# Patient Record
Sex: FEMALE | Race: WHITE | URBAN - METROPOLITAN AREA
[De-identification: names, ages, dates, MRNs, and addresses within clinical notes are randomized per-mention and may not be internally consistent; named-entity substitution may affect disease eponyms.]

---

## 2024-05-11 ENCOUNTER — NURSING HOME VISIT (OUTPATIENT)
Dept: POST ACUTE CARE | Facility: EXTERNAL LOCATION | Age: 84
End: 2024-05-11
Payer: COMMERCIAL

## 2024-05-11 DIAGNOSIS — E10.9 TYPE 1 DIABETES MELLITUS WITHOUT COMPLICATION (MULTI): ICD-10-CM

## 2024-05-11 DIAGNOSIS — F32.A DEPRESSION, UNSPECIFIED DEPRESSION TYPE: ICD-10-CM

## 2024-05-11 DIAGNOSIS — I10 HYPERTENSION, UNSPECIFIED TYPE: ICD-10-CM

## 2024-05-11 DIAGNOSIS — K21.9 GASTROESOPHAGEAL REFLUX DISEASE, UNSPECIFIED WHETHER ESOPHAGITIS PRESENT: ICD-10-CM

## 2024-05-11 DIAGNOSIS — L03.90 CELLULITIS, UNSPECIFIED CELLULITIS SITE: Primary | ICD-10-CM

## 2024-05-11 DIAGNOSIS — E78.5 HYPERLIPIDEMIA, UNSPECIFIED HYPERLIPIDEMIA TYPE: ICD-10-CM

## 2024-05-11 PROCEDURE — 99305 1ST NF CARE MODERATE MDM 35: CPT | Performed by: INTERNAL MEDICINE

## 2024-05-11 NOTE — LETTER
Patient: Christina Davila  : 1940    Encounter Date: 2024    HISTORY & PHYSICAL    Subjective  Chief complaint: Christina Davila is a 83 y.o. female who is being seen and evaluated for multiple medical problems.  Patient admitted to SNF for therapy due to weakness after recent hospitalization.    HPI:  HPI  Patient presented to ED with leg swelling and wounds.  Patient was admitted for further evaluation management.  Patient was diagnosed with cellulitis and started on antibiotics, followed by ID.  Wound care followed patient for bilateral lower extremity wounds.  Patient is to continue antibiotics outpatient.  Patient was hemodynamically stable to be discharged to SNF for continued medical management and therapy.  Patient was seen and examined at the bedside, appears to be in no acute distress.  Patient denies chest pain or shortness of breath.  Denies nausea or vomiting.    Past Medical History:   Diagnosis Date   • Arthritis    • Cellulitis    • Cognitive communication deficit    • Diabetes mellitus (Multi)    • GERD (gastroesophageal reflux disease)    • Hyperlipidemia    • Hypertension        No past surgical history on file.    Family History   Problem Relation Name Age of Onset   • No Known Problems Mother     • No Known Problems Father         Social History     Socioeconomic History   • Marital status: Single     Spouse name: Not on file   • Number of children: Not on file   • Years of education: Not on file   • Highest education level: Not on file   Occupational History   • Not on file   Tobacco Use   • Smoking status: Not on file   • Smokeless tobacco: Not on file   Substance and Sexual Activity   • Alcohol use: Not on file   • Drug use: Not on file   • Sexual activity: Not on file   Other Topics Concern   • Not on file   Social History Narrative   • Not on file     Social Determinants of Health     Financial Resource Strain: Not on file   Food Insecurity: Not on file   Transportation Needs: Not on  file   Physical Activity: Not on file   Stress: Not on file   Social Connections: Not on file   Intimate Partner Violence: Not on file   Housing Stability: Not on file       Vital signs: 110/88, 97.9, 78, 17, 98%, blood sugar 126    Objective  Physical Exam  Constitutional:       General: She is not in acute distress.  Eyes:      Extraocular Movements: Extraocular movements intact.   Cardiovascular:      Rate and Rhythm: Normal rate and regular rhythm.   Pulmonary:      Effort: Pulmonary effort is normal.      Breath sounds: Normal breath sounds.   Abdominal:      General: Bowel sounds are normal.      Palpations: Abdomen is soft.   Genitourinary:     Comments: Rodriguez  Musculoskeletal:      Cervical back: Neck supple.      Right lower leg: No edema.      Left lower leg: No edema.   Neurological:      Mental Status: She is alert.   Psychiatric:         Mood and Affect: Mood normal.         Behavior: Behavior is cooperative.         Assessment/Plan  Problem List Items Addressed This Visit       Cellulitis - Primary     Continue antibiotic until complete         Diabetes mellitus (Multi)     Monitor fasting blood sugar  Glucoscans         GERD (gastroesophageal reflux disease)     Controlled  Monitor GI symptoms         Hyperlipidemia     Statin  Monitor lipids and LFTs         Hypertension     Monitor blood pressure  Metoprolol  Hydrochlorothiazide         Depression     Monitor mood and behaviors  Quetiapine           Hospital records reviewed  Medications, treatments, and labs reviewed  Continue medications and treatments as listed in EMR  Discussed with nursing and therapy      Scribe Attestation  I, Shantel Chen   attest that this documentation has been prepared under the direction and in the presence of Christian Mazariegos MD    Provider Attestation - Scribe documentation  All medical record entries made by the Scribe were at my direction and personally dictated by me. I have reviewed the chart and agree  that the record accurately reflects my personal performance of the history, physical exam, discussion and plan.   Chirstian Mazariegos MD      Electronically Signed By: Christian Mazariegos MD   5/14/24  4:36 PM

## 2024-05-14 ENCOUNTER — NURSING HOME VISIT (OUTPATIENT)
Dept: POST ACUTE CARE | Facility: EXTERNAL LOCATION | Age: 84
End: 2024-05-14
Payer: COMMERCIAL

## 2024-05-14 DIAGNOSIS — E78.5 HYPERLIPIDEMIA, UNSPECIFIED HYPERLIPIDEMIA TYPE: ICD-10-CM

## 2024-05-14 DIAGNOSIS — F32.A DEPRESSION, UNSPECIFIED DEPRESSION TYPE: ICD-10-CM

## 2024-05-14 DIAGNOSIS — R41.841 COGNITIVE COMMUNICATION DEFICIT: ICD-10-CM

## 2024-05-14 DIAGNOSIS — R53.1 WEAKNESS: ICD-10-CM

## 2024-05-14 DIAGNOSIS — I10 HYPERTENSION, UNSPECIFIED TYPE: ICD-10-CM

## 2024-05-14 DIAGNOSIS — M19.90 ARTHRITIS: Primary | ICD-10-CM

## 2024-05-14 DIAGNOSIS — E10.9 TYPE 1 DIABETES MELLITUS WITHOUT COMPLICATION (MULTI): ICD-10-CM

## 2024-05-14 DIAGNOSIS — L03.90 CELLULITIS, UNSPECIFIED CELLULITIS SITE: ICD-10-CM

## 2024-05-14 DIAGNOSIS — E46 MALNUTRITION, UNSPECIFIED TYPE (MULTI): ICD-10-CM

## 2024-05-14 PROBLEM — K21.9 GERD (GASTROESOPHAGEAL REFLUX DISEASE): Status: ACTIVE | Noted: 2024-05-14

## 2024-05-14 PROBLEM — E11.9 DIABETES MELLITUS (MULTI): Status: ACTIVE | Noted: 2024-05-14

## 2024-05-14 PROCEDURE — 99309 SBSQ NF CARE MODERATE MDM 30: CPT | Performed by: NURSE PRACTITIONER

## 2024-05-14 NOTE — PROGRESS NOTES
HISTORY & PHYSICAL    Subjective   Chief complaint: Christina Davila is a 83 y.o. female who is being seen and evaluated for multiple medical problems.  Patient admitted to SNF for therapy due to weakness after recent hospitalization.    HPI:  HPI  Patient presented to ED with leg swelling and wounds.  Patient was admitted for further evaluation management.  Patient was diagnosed with cellulitis and started on antibiotics, followed by ID.  Wound care followed patient for bilateral lower extremity wounds.  Patient is to continue antibiotics outpatient.  Patient was hemodynamically stable to be discharged to SNF for continued medical management and therapy.  Patient was seen and examined at the bedside, appears to be in no acute distress.  Patient denies chest pain or shortness of breath.  Denies nausea or vomiting.    Past Medical History:   Diagnosis Date    Arthritis     Cellulitis     Cognitive communication deficit     Diabetes mellitus (Multi)     GERD (gastroesophageal reflux disease)     Hyperlipidemia     Hypertension        No past surgical history on file.    Family History   Problem Relation Name Age of Onset    No Known Problems Mother      No Known Problems Father         Social History     Socioeconomic History    Marital status: Single     Spouse name: Not on file    Number of children: Not on file    Years of education: Not on file    Highest education level: Not on file   Occupational History    Not on file   Tobacco Use    Smoking status: Not on file    Smokeless tobacco: Not on file   Substance and Sexual Activity    Alcohol use: Not on file    Drug use: Not on file    Sexual activity: Not on file   Other Topics Concern    Not on file   Social History Narrative    Not on file     Social Determinants of Health     Financial Resource Strain: Not on file   Food Insecurity: Not on file   Transportation Needs: Not on file   Physical Activity: Not on file   Stress: Not on file   Social Connections: Not on  file   Intimate Partner Violence: Not on file   Housing Stability: Not on file       Vital signs: 110/88, 97.9, 78, 17, 98%, blood sugar 126    Objective   Physical Exam  Constitutional:       General: She is not in acute distress.  Eyes:      Extraocular Movements: Extraocular movements intact.   Cardiovascular:      Rate and Rhythm: Normal rate and regular rhythm.   Pulmonary:      Effort: Pulmonary effort is normal.      Breath sounds: Normal breath sounds.   Abdominal:      General: Bowel sounds are normal.      Palpations: Abdomen is soft.   Genitourinary:     Comments: Rodriguez  Musculoskeletal:      Cervical back: Neck supple.      Right lower leg: No edema.      Left lower leg: No edema.   Neurological:      Mental Status: She is alert.   Psychiatric:         Mood and Affect: Mood normal.         Behavior: Behavior is cooperative.         Assessment/Plan   Problem List Items Addressed This Visit       Cellulitis - Primary     Continue antibiotic until complete         Diabetes mellitus (Multi)     Monitor fasting blood sugar  Glucoscans         GERD (gastroesophageal reflux disease)     Controlled  Monitor GI symptoms         Hyperlipidemia     Statin  Monitor lipids and LFTs         Hypertension     Monitor blood pressure  Metoprolol  Hydrochlorothiazide         Depression     Monitor mood and behaviors  Quetiapine           Hospital records reviewed  Medications, treatments, and labs reviewed  Continue medications and treatments as listed in EMR  Discussed with nursing and therapy      Scribe Attestation  I, Shantel Chen   attest that this documentation has been prepared under the direction and in the presence of Christian Mazariegos MD    Provider Attestation - Scribe documentation  All medical record entries made by the Scribe were at my direction and personally dictated by me. I have reviewed the chart and agree that the record accurately reflects my personal performance of the history, physical  exam, discussion and plan.   Christian Mazariegos MD

## 2024-05-14 NOTE — LETTER
Patient: Christina Davila  : 1940    Encounter Date: 2024    PROGRESS NOTE    Subjective  Chief complaint: Christina Davila is a 83 y.o. female who is an acute skilled patient being seen and evaluated for weakness    HPI: is in bed with her lower legs elevated.  She remains on antibiotics for resolving cellulitis.  afebrile.  Nursing reports that her that she has a fair appetite.  She denies any discomfort at this time.  Denies chest pain or tightness.  Was seen by therapy this morning  HPI      Objective  Vital signs: 119/88-78-17-98.3     Physical Exam  Vitals and nursing note reviewed.   Constitutional:       General: She is not in acute distress.     Appearance: She is underweight.   Eyes:      Extraocular Movements: Extraocular movements intact.   Cardiovascular:      Rate and Rhythm: Normal rate and regular rhythm.   Pulmonary:      Effort: Pulmonary effort is normal.      Breath sounds: Normal breath sounds.      Comments: Lungs clear   Abdominal:      General: Bowel sounds are normal.      Palpations: Abdomen is soft.   Musculoskeletal:      Cervical back: Neck supple.      Right lower leg: No edema.      Left lower leg: No edema.      Comments: Both hands with arthritic changes, limited extension in fingers    Neurological:      Mental Status: She is alert.   Psychiatric:         Mood and Affect: Mood normal.         Behavior: Behavior is cooperative.         Assessment/Plan  Problem List Items Addressed This Visit       Cellulitis     Continue antibiotic until complete  Wound care is following wound care needs          Cognitive communication deficit     Monitor abilities   ST to assess         Diabetes mellitus (Multi)     Monitor fasting blood sugar  Glucoscans         Hyperlipidemia     Statin  Monitor lipids and LFTs         Hypertension     Monitor blood pressure  Metoprolol  Hydrochlorothiazide  BP at goal         Weakness     Remains in therapy   Agreed with regular scheduled dose of tylenol    Monitor effectiveness          Depression     Monitor mood and behaviors  Quetiapine          Arthritis - Primary     Has extensive arthritic changes in upper arms and hands. Has limited mobility - habds contracture    No inflammation          Malnutrition (Multi)     Underweight   Reports poor appetite   Monitor    Encourage supplements           Medications, treatments, and labs reviewed  Continue medications and treatments as listed in EMR    MARY Munoz      Electronically Signed By: MARY Munoz   5/16/24  6:31 PM

## 2024-05-14 NOTE — PROGRESS NOTES
PROGRESS NOTE    Subjective   Chief complaint: Christina Davila is a 83 y.o. female who is an acute skilled patient being seen and evaluated for weakness    HPI: is in bed with her lower legs elevated.  She remains on antibiotics for resolving cellulitis.  afebrile.  Nursing reports that her that she has a fair appetite.  She denies any discomfort at this time.  Denies chest pain or tightness.  Was seen by therapy this morning  HPI      Objective   Vital signs: 119/88-78-17-98.3     Physical Exam  Vitals and nursing note reviewed.   Constitutional:       General: She is not in acute distress.     Appearance: She is underweight.   Eyes:      Extraocular Movements: Extraocular movements intact.   Cardiovascular:      Rate and Rhythm: Normal rate and regular rhythm.   Pulmonary:      Effort: Pulmonary effort is normal.      Breath sounds: Normal breath sounds.      Comments: Lungs clear   Abdominal:      General: Bowel sounds are normal.      Palpations: Abdomen is soft.   Musculoskeletal:      Cervical back: Neck supple.      Right lower leg: No edema.      Left lower leg: No edema.      Comments: Both hands with arthritic changes, limited extension in fingers    Neurological:      Mental Status: She is alert.   Psychiatric:         Mood and Affect: Mood normal.         Behavior: Behavior is cooperative.         Assessment/Plan   Problem List Items Addressed This Visit       Cellulitis     Continue antibiotic until complete  Wound care is following wound care needs          Cognitive communication deficit     Monitor abilities   ST to assess         Diabetes mellitus (Multi)     Monitor fasting blood sugar  Glucoscans         Hyperlipidemia     Statin  Monitor lipids and LFTs         Hypertension     Monitor blood pressure  Metoprolol  Hydrochlorothiazide  BP at goal         Weakness     Remains in therapy   Agreed with regular scheduled dose of tylenol   Monitor effectiveness          Depression     Monitor mood and  behaviors  Quetiapine          Arthritis - Primary     Has extensive arthritic changes in upper arms and hands. Has limited mobility - habds contracture    No inflammation          Malnutrition (Multi)     Underweight   Reports poor appetite   Monitor    Encourage supplements           Medications, treatments, and labs reviewed  Continue medications and treatments as listed in EMR    Stella Duncan, APRN-CNP

## 2024-05-15 ENCOUNTER — NURSING HOME VISIT (OUTPATIENT)
Dept: POST ACUTE CARE | Facility: EXTERNAL LOCATION | Age: 84
End: 2024-05-15
Payer: COMMERCIAL

## 2024-05-15 DIAGNOSIS — F32.A DEPRESSION, UNSPECIFIED DEPRESSION TYPE: ICD-10-CM

## 2024-05-15 DIAGNOSIS — I10 HYPERTENSION, UNSPECIFIED TYPE: ICD-10-CM

## 2024-05-15 DIAGNOSIS — E10.9 TYPE 1 DIABETES MELLITUS WITHOUT COMPLICATION (MULTI): ICD-10-CM

## 2024-05-15 DIAGNOSIS — K21.9 GASTROESOPHAGEAL REFLUX DISEASE, UNSPECIFIED WHETHER ESOPHAGITIS PRESENT: ICD-10-CM

## 2024-05-15 DIAGNOSIS — R53.1 WEAKNESS: Primary | ICD-10-CM

## 2024-05-15 PROCEDURE — 99309 SBSQ NF CARE MODERATE MDM 30: CPT | Performed by: INTERNAL MEDICINE

## 2024-05-15 NOTE — PROGRESS NOTES
PROGRESS NOTE    Subjective   Chief complaint: Christina Davila is a 83 y.o. female who is an acute skilled patient being seen and evaluated for weakness    HPI:  HPI  Patient presents for general medical care and f/u.  Patient seen and examined at bedside.  No issues per nursing.  Patient has no acute complaints. Patient does continue to work in therapy.  Patient is requiring mod assist to complete transfers from various surfaces.  DM, denies polydipsia polyuria polyphagia.  HTN BP at goal.  Denies chest pain and headache.  GERD controlled.  Denies heartburn, regurgitation, epigastric discomfort, sour taste, and cough.  Patient with diagnosis of depression.  Mood is stable.  Denies feeling down and thoughts of harming self or others.  No acute distress.    Objective   Vital signs: 110/88, 98.1, 78, 17, 98%, blood sugar 123    Physical Exam  Constitutional:       General: She is not in acute distress.  Eyes:      Extraocular Movements: Extraocular movements intact.   Cardiovascular:      Rate and Rhythm: Normal rate and regular rhythm.   Pulmonary:      Effort: Pulmonary effort is normal.      Breath sounds: Normal breath sounds.   Abdominal:      General: Bowel sounds are normal.      Palpations: Abdomen is soft.   Genitourinary:     Comments: Rodriguez  Musculoskeletal:      Cervical back: Neck supple.      Right lower leg: No edema.      Left lower leg: No edema.   Neurological:      Mental Status: She is alert.      Motor: Weakness present.   Psychiatric:         Mood and Affect: Mood normal.         Behavior: Behavior is cooperative.         Assessment/Plan   Problem List Items Addressed This Visit       Diabetes mellitus (Multi)     Monitor fasting blood sugar  Glucoscans         GERD (gastroesophageal reflux disease)     Controlled  Monitor GI symptoms         Hypertension     Monitor blood pressure  Metoprolol  Hydrochlorothiazide  BP at goal         Weakness - Primary     Continue therapy         Depression      Monitor mood and behaviors  Quetiapine           Medications, treatments, and labs reviewed  Continue medications and treatments as listed in EMR      Scribe Attestation  I, Shantel Chen   attest that this documentation has been prepared under the direction and in the presence of Christian Mazariegos MD    Provider Attestation - Scribe documentation  All medical record entries made by the Scribe were at my direction and personally dictated by me. I have reviewed the chart and agree that the record accurately reflects my personal performance of the history, physical exam, discussion and plan.   Christian Mazariegos MD

## 2024-05-15 NOTE — LETTER
Patient: Christina Davila  : 1940    Encounter Date: 05/15/2024    PROGRESS NOTE    Subjective  Chief complaint: Christina Davila is a 83 y.o. female who is an acute skilled patient being seen and evaluated for weakness    HPI:  HPI  Patient presents for general medical care and f/u.  Patient seen and examined at bedside.  No issues per nursing.  Patient has no acute complaints. Patient does continue to work in therapy.  Patient is requiring mod assist to complete transfers from various surfaces.  DM, denies polydipsia polyuria polyphagia.  HTN BP at goal.  Denies chest pain and headache.  GERD controlled.  Denies heartburn, regurgitation, epigastric discomfort, sour taste, and cough.  Patient with diagnosis of depression.  Mood is stable.  Denies feeling down and thoughts of harming self or others.  No acute distress.    Objective  Vital signs: 110/88, 98.1, 78, 17, 98%, blood sugar 123    Physical Exam  Constitutional:       General: She is not in acute distress.  Eyes:      Extraocular Movements: Extraocular movements intact.   Cardiovascular:      Rate and Rhythm: Normal rate and regular rhythm.   Pulmonary:      Effort: Pulmonary effort is normal.      Breath sounds: Normal breath sounds.   Abdominal:      General: Bowel sounds are normal.      Palpations: Abdomen is soft.   Genitourinary:     Comments: Rodriguez  Musculoskeletal:      Cervical back: Neck supple.      Right lower leg: No edema.      Left lower leg: No edema.   Neurological:      Mental Status: She is alert.      Motor: Weakness present.   Psychiatric:         Mood and Affect: Mood normal.         Behavior: Behavior is cooperative.         Assessment/Plan  Problem List Items Addressed This Visit       Diabetes mellitus (Multi)     Monitor fasting blood sugar  Glucoscans         GERD (gastroesophageal reflux disease)     Controlled  Monitor GI symptoms         Hypertension     Monitor blood pressure  Metoprolol  Hydrochlorothiazide  BP at goal          Weakness - Primary     Continue therapy         Depression     Monitor mood and behaviors  Quetiapine           Medications, treatments, and labs reviewed  Continue medications and treatments as listed in EMR      Scribe Attestation  I, Shantel Chen   attest that this documentation has been prepared under the direction and in the presence of Christian Mazariegos MD    Provider Attestation - Scribe documentation  All medical record entries made by the Scribe were at my direction and personally dictated by me. I have reviewed the chart and agree that the record accurately reflects my personal performance of the history, physical exam, discussion and plan.   Christian Mazariegos MD        Electronically Signed By: Christian Mazariegos MD   5/15/24  1:51 PM

## 2024-05-16 ENCOUNTER — NURSING HOME VISIT (OUTPATIENT)
Dept: POST ACUTE CARE | Facility: EXTERNAL LOCATION | Age: 84
End: 2024-05-16
Payer: COMMERCIAL

## 2024-05-16 DIAGNOSIS — R41.841 COGNITIVE COMMUNICATION DEFICIT: Primary | ICD-10-CM

## 2024-05-16 DIAGNOSIS — E78.5 HYPERLIPIDEMIA, UNSPECIFIED HYPERLIPIDEMIA TYPE: ICD-10-CM

## 2024-05-16 DIAGNOSIS — K21.9 GASTROESOPHAGEAL REFLUX DISEASE, UNSPECIFIED WHETHER ESOPHAGITIS PRESENT: ICD-10-CM

## 2024-05-16 DIAGNOSIS — E46 MALNUTRITION, UNSPECIFIED TYPE (MULTI): ICD-10-CM

## 2024-05-16 DIAGNOSIS — M19.90 ARTHRITIS: ICD-10-CM

## 2024-05-16 DIAGNOSIS — E10.9 TYPE 1 DIABETES MELLITUS WITHOUT COMPLICATION (MULTI): ICD-10-CM

## 2024-05-16 DIAGNOSIS — I10 HYPERTENSION, UNSPECIFIED TYPE: ICD-10-CM

## 2024-05-16 DIAGNOSIS — R53.1 WEAKNESS: ICD-10-CM

## 2024-05-16 DIAGNOSIS — L03.90 CELLULITIS, UNSPECIFIED CELLULITIS SITE: ICD-10-CM

## 2024-05-16 DIAGNOSIS — F32.A DEPRESSION, UNSPECIFIED DEPRESSION TYPE: ICD-10-CM

## 2024-05-16 PROCEDURE — 99309 SBSQ NF CARE MODERATE MDM 30: CPT | Performed by: NURSE PRACTITIONER

## 2024-05-16 NOTE — ASSESSMENT & PLAN NOTE
>>ASSESSMENT AND PLAN FOR COGNITIVE COMMUNICATION DEFICIT WRITTEN ON 5/16/2024  6:26 PM BY TAMI GARCIA-HELENA    Monitor abilities   ST to assess

## 2024-05-16 NOTE — LETTER
Patient: Christina Davila  : 1940    Encounter Date: 2024    PROGRESS NOTE    Subjective  Chief complaint: Christina Davila is a 83 y.o. female who is an acute skilled patient being seen and evaluated for weakness    HPI: is working with the therapist, walking short distance. She reports everything hurts when she moves.    Hands and joints present with arthritic changes. She reports that she doesn't like to take medications. Agreed with recommendation for regularly scheduled dose of tylenol   Will monitor. Denies any chest pain or tightness.    She reports that her diet is poor. Encourage supplements     HPI      Objective  Vital signs: 126/73-88-18-97.2    Physical Exam  Vitals and nursing note reviewed.   Constitutional:       General: She is not in acute distress.     Appearance: She is well-groomed and underweight.   Eyes:      Extraocular Movements: Extraocular movements intact.   Cardiovascular:      Rate and Rhythm: Normal rate and regular rhythm.   Pulmonary:      Effort: Pulmonary effort is normal.      Breath sounds: Normal breath sounds.      Comments: Lungs clear   Abdominal:      General: Bowel sounds are normal.      Palpations: Abdomen is soft.   Musculoskeletal:      Cervical back: Neck supple.      Right lower leg: No edema.      Left lower leg: No edema.   Neurological:      Mental Status: She is alert.   Psychiatric:         Mood and Affect: Mood normal.         Behavior: Behavior is cooperative.         Assessment/Plan  Problem List Items Addressed This Visit       Cellulitis     Continue antibiotic until complete  Wound care is following wound care needs          Cognitive communication deficit - Primary     Monitor abilities   ST to assess         Diabetes mellitus (Multi)     Monitor fasting blood sugar  Glucoscans         GERD (gastroesophageal reflux disease)     Controlled  Monitor GI symptoms         Hyperlipidemia     Statin  Monitor lipids and LFTs         Hypertension     Monitor  blood pressure  Metoprolol  Hydrochlorothiazide  BP at goal         Weakness     Remains in therapy   Agreed with regular scheduled dose of tylenol   Monitor effectiveness          Depression     Monitor mood and behaviors  Quetiapine          Arthritis     Has extensive arthritic changes in upper arms and hands. Has limited mobility - habds contracture    No inflammation          Malnutrition (Multi)     Underweight   Reports poor appetite   Monitor    Encourage supplements           Medications, treatments, and labs reviewed  Continue medications and treatments as listed in EMR    MARY Munoz      Electronically Signed By: MARY Munoz   5/16/24  6:29 PM

## 2024-05-16 NOTE — ASSESSMENT & PLAN NOTE
Has extensive arthritic changes in upper arms and hands. Has limited mobility - habds contracture    No inflammation

## 2024-05-16 NOTE — ASSESSMENT & PLAN NOTE
>>ASSESSMENT AND PLAN FOR COGNITIVE COMMUNICATION DEFICIT WRITTEN ON 5/16/2024  6:31 PM BY TAMI GARCIA-HELENA    Monitor abilities   ST to assess

## 2024-05-16 NOTE — PROGRESS NOTES
PROGRESS NOTE    Subjective   Chief complaint: Christina Davila is a 83 y.o. female who is an acute skilled patient being seen and evaluated for weakness    HPI: is working with the therapist, walking short distance. She reports everything hurts when she moves.    Hands and joints present with arthritic changes. She reports that she doesn't like to take medications. Agreed with recommendation for regularly scheduled dose of tylenol   Will monitor. Denies any chest pain or tightness.    She reports that her diet is poor. Encourage supplements     HPI      Objective   Vital signs: 126/73-88-18-97.2    Physical Exam  Vitals and nursing note reviewed.   Constitutional:       General: She is not in acute distress.     Appearance: She is well-groomed and underweight.   Eyes:      Extraocular Movements: Extraocular movements intact.   Cardiovascular:      Rate and Rhythm: Normal rate and regular rhythm.   Pulmonary:      Effort: Pulmonary effort is normal.      Breath sounds: Normal breath sounds.      Comments: Lungs clear   Abdominal:      General: Bowel sounds are normal.      Palpations: Abdomen is soft.   Musculoskeletal:      Cervical back: Neck supple.      Right lower leg: No edema.      Left lower leg: No edema.   Neurological:      Mental Status: She is alert.   Psychiatric:         Mood and Affect: Mood normal.         Behavior: Behavior is cooperative.         Assessment/Plan   Problem List Items Addressed This Visit       Cellulitis     Continue antibiotic until complete  Wound care is following wound care needs          Cognitive communication deficit - Primary     Monitor abilities   ST to assess         Diabetes mellitus (Multi)     Monitor fasting blood sugar  Glucoscans         GERD (gastroesophageal reflux disease)     Controlled  Monitor GI symptoms         Hyperlipidemia     Statin  Monitor lipids and LFTs         Hypertension     Monitor blood pressure  Metoprolol  Hydrochlorothiazide  BP at goal          Weakness     Remains in therapy   Agreed with regular scheduled dose of tylenol   Monitor effectiveness          Depression     Monitor mood and behaviors  Quetiapine          Arthritis     Has extensive arthritic changes in upper arms and hands. Has limited mobility - habds contracture    No inflammation          Malnutrition (Multi)     Underweight   Reports poor appetite   Monitor    Encourage supplements           Medications, treatments, and labs reviewed  Continue medications and treatments as listed in EMR    Stella Duncan, APRN-CNP

## 2024-05-18 ENCOUNTER — NURSING HOME VISIT (OUTPATIENT)
Dept: POST ACUTE CARE | Facility: EXTERNAL LOCATION | Age: 84
End: 2024-05-18
Payer: COMMERCIAL

## 2024-05-18 DIAGNOSIS — E10.9 TYPE 1 DIABETES MELLITUS WITHOUT COMPLICATION (MULTI): ICD-10-CM

## 2024-05-18 DIAGNOSIS — I10 HYPERTENSION, UNSPECIFIED TYPE: ICD-10-CM

## 2024-05-18 DIAGNOSIS — F32.A DEPRESSION, UNSPECIFIED DEPRESSION TYPE: ICD-10-CM

## 2024-05-18 DIAGNOSIS — R53.1 WEAKNESS: ICD-10-CM

## 2024-05-18 PROCEDURE — 99308 SBSQ NF CARE LOW MDM 20: CPT | Performed by: INTERNAL MEDICINE

## 2024-05-18 NOTE — LETTER
Patient: Christina Davila  : 1940    Encounter Date: 2024    PROGRESS NOTE    Subjective  Chief complaint: Christina Davila is a 83 y.o. female who is an acute skilled patient being seen and evaluated for weakness    HPI:  Patient admitted to SNF for therapy d/t weakness after recent hospitalization. Patient requires assist with ADLs and transfers.  Pt progressed on Scifit bike on lvl. 1 for 10 minutes with use of BLE's/BUE's on manual setting. Pt sat EOB with MOD/A for supine<>Eob transfer completing UB dressing with MOD/MAX/A and grooming hygiene with MAX/A requiring MOD tactile cuing to maintain cervical extension / F- sitting balance. Pt tolerated up to 5 mins of activity with several rest breaks secondary to fatigue. ST focusing on speech skills. Given mod verbal cues, context cues, repetition, environmental modifications and structuring with integration of adl objects/manipulatives to faciitate cognizant, effective adl verbal expression. No new complaints.        Objective  Vital signs: 114/76,70,97%,     Physical Exam  Constitutional:       General: She is not in acute distress.  Eyes:      Extraocular Movements: Extraocular movements intact.   Cardiovascular:      Rate and Rhythm: Normal rate and regular rhythm.   Pulmonary:      Effort: Pulmonary effort is normal.      Breath sounds: Normal breath sounds.   Abdominal:      General: Bowel sounds are normal.      Palpations: Abdomen is soft.   Genitourinary:     Comments: Rodriguez  Musculoskeletal:      Cervical back: Neck supple.      Right lower leg: No edema.      Left lower leg: No edema.   Neurological:      Mental Status: She is alert.      Motor: Weakness present.   Psychiatric:         Mood and Affect: Mood normal.         Behavior: Behavior is cooperative.         Assessment/Plan  Problem List Items Addressed This Visit       Diabetes mellitus (Multi)     Monitor fasting blood sugar  Glucoscans         Hypertension     Monitor blood  pressure  Metoprolol  Hydrochlorothiazide  BP at goal         Weakness     Remains in therapy           Depression     Monitor mood and behaviors  Quetiapine           Medications, treatments, and labs reviewed  Continue medications and treatments as listed in EMR    Scribe Attestation  PIERRE, Shantel Valentin   attest that this documentation has been prepared under the direction and in the presence of Christian Mazariegos MD.     Provider Attestation - Scribe documentation  All medical record entries made by the Scribe were at my direction and personally dictated by me. I have reviewed the chart and agree that the record accurately reflects my personal performance of the history, physical exam, discussion and plan.   Christian Mazariegos MD      Electronically Signed By: Christian Mazariegos MD   5/20/24  5:09 PM

## 2024-05-20 ENCOUNTER — NURSING HOME VISIT (OUTPATIENT)
Dept: POST ACUTE CARE | Facility: EXTERNAL LOCATION | Age: 84
End: 2024-05-20
Payer: COMMERCIAL

## 2024-05-20 DIAGNOSIS — R53.1 WEAKNESS: ICD-10-CM

## 2024-05-20 DIAGNOSIS — F32.A DEPRESSION, UNSPECIFIED DEPRESSION TYPE: ICD-10-CM

## 2024-05-20 DIAGNOSIS — E10.9 TYPE 1 DIABETES MELLITUS WITHOUT COMPLICATION (MULTI): ICD-10-CM

## 2024-05-20 DIAGNOSIS — I10 HYPERTENSION, UNSPECIFIED TYPE: ICD-10-CM

## 2024-05-20 PROCEDURE — 99308 SBSQ NF CARE LOW MDM 20: CPT | Performed by: INTERNAL MEDICINE

## 2024-05-20 NOTE — PROGRESS NOTES
PROGRESS NOTE    Subjective   Chief complaint: Christina Davila is a 83 y.o. female who is an acute skilled patient being seen and evaluated for weakness    HPI:  Therapy has been working with the patient to improve strength and endurance with ADLs, transfers, and mobility.  Patient continues to work toward goals. Focused on dynamic balance activities during sitting and static balance activities during sitting on side of bed for 15 minutes with no support support. Bed mobility rolling m mod of1 . Supine - sitting mod of1/ min of1. Patient need cuing to move hips forward on bed with cuing on weight shifiting. Sit- stand from bed / - w/c mod of 1-2. ST working on speech skills. Given mod verbal cues context cues, environmental modifications and structuring, skilled observations with forced and mulitple choice. GOALS: 1)open ended ?s 70% mod cues 2) recall: 70% mod cues P.  Patient is stable and has no new complaints.  Nursing staff voices no new concerns today.      Objective   Vital signs: 114/76,70,97%,     Physical Exam  Constitutional:       General: She is not in acute distress.  Eyes:      Extraocular Movements: Extraocular movements intact.   Cardiovascular:      Rate and Rhythm: Normal rate and regular rhythm.   Pulmonary:      Effort: Pulmonary effort is normal.      Breath sounds: Normal breath sounds.   Abdominal:      General: Bowel sounds are normal.      Palpations: Abdomen is soft.   Genitourinary:     Comments: Rodriguez  Musculoskeletal:      Cervical back: Neck supple.      Right lower leg: No edema.      Left lower leg: No edema.   Neurological:      Mental Status: She is alert.      Motor: Weakness present.   Psychiatric:         Mood and Affect: Mood normal.         Behavior: Behavior is cooperative.         Assessment/Plan   Problem List Items Addressed This Visit       Diabetes mellitus (Multi)     Monitor fasting blood sugar  Glucoscans         Hypertension     Monitor blood  pressure  Metoprolol  Hydrochlorothiazide  BP at goal         Weakness     Remains in therapy           Depression     Monitor mood and behaviors  Quetiapine           Medications, treatments, and labs reviewed  Continue medications and treatments as listed in EMR    Scribe Attestation  PIERRE, Shantel Valentin   attest that this documentation has been prepared under the direction and in the presence of Christian Mazariegos MD.     Provider Attestation - Scribe documentation  All medical record entries made by the Scribe were at my direction and personally dictated by me. I have reviewed the chart and agree that the record accurately reflects my personal performance of the history, physical exam, discussion and plan.   Christian Mazariegos MD

## 2024-05-20 NOTE — PROGRESS NOTES
PROGRESS NOTE    Subjective   Chief complaint: Christina Davila is a 83 y.o. female who is an acute skilled patient being seen and evaluated for weakness    HPI:  Patient admitted to SNF for therapy d/t weakness after recent hospitalization. Patient requires assist with ADLs and transfers.  Pt progressed on Scifit bike on lvl. 1 for 10 minutes with use of BLE's/BUE's on manual setting. Pt sat EOB with MOD/A for supine<>Eob transfer completing UB dressing with MOD/MAX/A and grooming hygiene with MAX/A requiring MOD tactile cuing to maintain cervical extension / F- sitting balance. Pt tolerated up to 5 mins of activity with several rest breaks secondary to fatigue. ST focusing on speech skills. Given mod verbal cues, context cues, repetition, environmental modifications and structuring with integration of adl objects/manipulatives to faciitate cognizant, effective adl verbal expression. No new complaints.        Objective   Vital signs: 114/76,70,97%,     Physical Exam  Constitutional:       General: She is not in acute distress.  Eyes:      Extraocular Movements: Extraocular movements intact.   Cardiovascular:      Rate and Rhythm: Normal rate and regular rhythm.   Pulmonary:      Effort: Pulmonary effort is normal.      Breath sounds: Normal breath sounds.   Abdominal:      General: Bowel sounds are normal.      Palpations: Abdomen is soft.   Genitourinary:     Comments: Rodriguez  Musculoskeletal:      Cervical back: Neck supple.      Right lower leg: No edema.      Left lower leg: No edema.   Neurological:      Mental Status: She is alert.      Motor: Weakness present.   Psychiatric:         Mood and Affect: Mood normal.         Behavior: Behavior is cooperative.         Assessment/Plan   Problem List Items Addressed This Visit       Diabetes mellitus (Multi)     Monitor fasting blood sugar  Glucoscans         Hypertension     Monitor blood pressure  Metoprolol  Hydrochlorothiazide  BP at goal         Weakness      Remains in therapy           Depression     Monitor mood and behaviors  Quetiapine           Medications, treatments, and labs reviewed  Continue medications and treatments as listed in EMR    Scribe Attestation  I, Shantel Valentin   attest that this documentation has been prepared under the direction and in the presence of Christian Mazariegos MD.     Provider Attestation - Scribe documentation  All medical record entries made by the Scribe were at my direction and personally dictated by me. I have reviewed the chart and agree that the record accurately reflects my personal performance of the history, physical exam, discussion and plan.   Christian Mazariegos MD

## 2024-05-21 ENCOUNTER — NURSING HOME VISIT (OUTPATIENT)
Dept: POST ACUTE CARE | Facility: EXTERNAL LOCATION | Age: 84
End: 2024-05-21
Payer: COMMERCIAL

## 2024-05-21 DIAGNOSIS — R53.1 WEAKNESS: Primary | ICD-10-CM

## 2024-05-21 DIAGNOSIS — F32.A DEPRESSION, UNSPECIFIED DEPRESSION TYPE: ICD-10-CM

## 2024-05-21 DIAGNOSIS — M19.90 ARTHRITIS: ICD-10-CM

## 2024-05-21 DIAGNOSIS — L03.90 CELLULITIS, UNSPECIFIED CELLULITIS SITE: ICD-10-CM

## 2024-05-21 DIAGNOSIS — I10 HYPERTENSION, UNSPECIFIED TYPE: ICD-10-CM

## 2024-05-21 DIAGNOSIS — E46 MALNUTRITION, UNSPECIFIED TYPE (MULTI): ICD-10-CM

## 2024-05-21 DIAGNOSIS — E78.5 HYPERLIPIDEMIA, UNSPECIFIED HYPERLIPIDEMIA TYPE: ICD-10-CM

## 2024-05-21 DIAGNOSIS — E87.1 HYPONATREMIA: ICD-10-CM

## 2024-05-21 DIAGNOSIS — E10.9 TYPE 1 DIABETES MELLITUS WITHOUT COMPLICATION (MULTI): ICD-10-CM

## 2024-05-21 DIAGNOSIS — K21.9 GASTROESOPHAGEAL REFLUX DISEASE, UNSPECIFIED WHETHER ESOPHAGITIS PRESENT: ICD-10-CM

## 2024-05-21 PROCEDURE — 99309 SBSQ NF CARE MODERATE MDM 30: CPT | Performed by: NURSE PRACTITIONER

## 2024-05-21 NOTE — LETTER
Patient: Christina Davila  : 1940    Encounter Date: 2024    PROGRESS NOTE    Subjective  Chief complaint: Christina Davila is a 83 y.o. female who is an acute skilled patient being seen and evaluated for weakness    HPI: .  Actively responding, talkative.  Remains in therapy.  She denies any chest pain or tightness.     PT reports that she is making slow progress.    Reviewed her current labs.    Her sodium level is low (126).  Will add sodium chloride tablets daily and repeat labs.    White blood count is slightly elevated 11.6, to send urine culture.    She denies any difficulty voiding.  Denies discomfort or burning.  The  reports that she  will be transferred back home with her .  They have a private caregiver. Will require hospital bed    HPI      Objective  Vital signs: 110/67-78-18-97.3     Physical Exam  Vitals and nursing note reviewed.   Constitutional:       General: She is not in acute distress.     Appearance: She is underweight.   Eyes:      Extraocular Movements: Extraocular movements intact.   Cardiovascular:      Rate and Rhythm: Normal rate and regular rhythm.   Pulmonary:      Effort: Pulmonary effort is normal.      Breath sounds: Normal breath sounds.      Comments: Lungs clear   Abdominal:      General: Bowel sounds are normal.      Palpations: Abdomen is soft.   Musculoskeletal:      Cervical back: Neck supple.      Right lower leg: No edema.      Left lower leg: No edema.      Comments: Both hands with arthritic changes, limited extension in fingers    Neurological:      Mental Status: She is alert.   Psychiatric:         Mood and Affect: Mood normal.         Behavior: Behavior is cooperative.         Assessment/Plan  Problem List Items Addressed This Visit       Cellulitis     Continue antibiotic until complete  Wound care is following wound care needs          Diabetes mellitus (Multi)     Monitor fasting blood sugar  Glucoscans         GERD (gastroesophageal reflux  disease)     Controlled  Monitor GI symptoms         Hyperlipidemia     Statin  Monitor lipids and LFTs         Hypertension     Monitor blood pressure  Metoprolol  Hydrochlorothiazide  BP at goal         Weakness - Primary     Continue working in therapy towards goals         Depression     Monitor mood and behaviors  Quetiapine          Arthritis     Has extensive arthritic changes in upper arms and hands. Has limited mobility - hands contracture    No inflammation          Malnutrition (Multi)     Underweight   Reports poor appetite   Monitor    Encourage supplements          Hyponatremia     Sodium tablets  Monitor BMP          Medications, treatments, and labs reviewed  Continue medications and treatments as listed in EMR    MARY Munoz      Electronically Signed By: MARY Munoz   5/25/24 11:59 AM

## 2024-05-21 NOTE — PROGRESS NOTES
PROGRESS NOTE    Subjective   Chief complaint: Christina Davila is a 83 y.o. female who is an acute skilled patient being seen and evaluated for weakness    HPI: .  Actively responding, talkative.  Remains in therapy.  She denies any chest pain or tightness.     PT reports that she is making slow progress.    Reviewed her current labs.    Her sodium level is low (126).  Will add sodium chloride tablets daily and repeat labs.    White blood count is slightly elevated 11.6, to send urine culture.    She denies any difficulty voiding.  Denies discomfort or burning.  The  reports that she  will be transferred back home with her .  They have a private caregiver. Will require hospital bed    HPI      Objective   Vital signs: 110/67-78-18-97.3     Physical Exam  Vitals and nursing note reviewed.   Constitutional:       General: She is not in acute distress.     Appearance: She is underweight.   Eyes:      Extraocular Movements: Extraocular movements intact.   Cardiovascular:      Rate and Rhythm: Normal rate and regular rhythm.   Pulmonary:      Effort: Pulmonary effort is normal.      Breath sounds: Normal breath sounds.      Comments: Lungs clear   Abdominal:      General: Bowel sounds are normal.      Palpations: Abdomen is soft.   Musculoskeletal:      Cervical back: Neck supple.      Right lower leg: No edema.      Left lower leg: No edema.      Comments: Both hands with arthritic changes, limited extension in fingers    Neurological:      Mental Status: She is alert.   Psychiatric:         Mood and Affect: Mood normal.         Behavior: Behavior is cooperative.         Assessment/Plan   Problem List Items Addressed This Visit       Cellulitis     Continue antibiotic until complete  Wound care is following wound care needs          Diabetes mellitus (Multi)     Monitor fasting blood sugar  Glucoscans         GERD (gastroesophageal reflux disease)     Controlled  Monitor GI symptoms          Hyperlipidemia     Statin  Monitor lipids and LFTs         Hypertension     Monitor blood pressure  Metoprolol  Hydrochlorothiazide  BP at goal         Weakness - Primary     Continue working in therapy towards goals         Depression     Monitor mood and behaviors  Quetiapine          Arthritis     Has extensive arthritic changes in upper arms and hands. Has limited mobility - hands contracture    No inflammation          Malnutrition (Multi)     Underweight   Reports poor appetite   Monitor    Encourage supplements          Hyponatremia     Sodium tablets  Monitor BMP          Medications, treatments, and labs reviewed  Continue medications and treatments as listed in EMR    Stella Duncan, APRN-CNP

## 2024-05-22 ENCOUNTER — NURSING HOME VISIT (OUTPATIENT)
Dept: POST ACUTE CARE | Facility: EXTERNAL LOCATION | Age: 84
End: 2024-05-22
Payer: COMMERCIAL

## 2024-05-22 DIAGNOSIS — I10 HYPERTENSION, UNSPECIFIED TYPE: ICD-10-CM

## 2024-05-22 DIAGNOSIS — R41.841 COGNITIVE COMMUNICATION DEFICIT: ICD-10-CM

## 2024-05-22 DIAGNOSIS — R53.1 WEAKNESS: Primary | ICD-10-CM

## 2024-05-22 DIAGNOSIS — K21.9 GASTROESOPHAGEAL REFLUX DISEASE, UNSPECIFIED WHETHER ESOPHAGITIS PRESENT: ICD-10-CM

## 2024-05-22 DIAGNOSIS — E87.1 HYPONATREMIA: ICD-10-CM

## 2024-05-22 PROCEDURE — 99308 SBSQ NF CARE LOW MDM 20: CPT | Performed by: INTERNAL MEDICINE

## 2024-05-22 NOTE — ASSESSMENT & PLAN NOTE
>>ASSESSMENT AND PLAN FOR COGNITIVE COMMUNICATION DEFICIT WRITTEN ON 5/22/2024  3:00 PM BY MICH WILLS    Speech therapy

## 2024-05-22 NOTE — LETTER
Patient: Christina Davila  : 1940    Encounter Date: 2024    PROGRESS NOTE    Subjective  Chief complaint: Christina Davila is a 83 y.o. female who is an acute skilled patient being seen and evaluated for weakness    HPI:  HPI  Patient does continue to work in therapy due to generalized weakness.  Therapy is working with patient on therapeutic exercises, dynamic balance activities during sitting and static balance activities during sitting on side of bed for 15 minutes.  Patient is also working on bed mobility requiring mod assist x 1.  Patient performing sit to stand from bed to wheelchair requiring mod assist of 1-2.  Patient seen and examined at the bedside, appears to be in no acute distress.  Patient does continue on sodium tablets due to sodium reportedly 126. Continue to monitor BMP    Objective  Vital signs: 126/76, 98.0, 17, 97%    Physical Exam  Constitutional:       General: She is not in acute distress.  Eyes:      Extraocular Movements: Extraocular movements intact.   Cardiovascular:      Rate and Rhythm: Normal rate and regular rhythm.   Pulmonary:      Effort: Pulmonary effort is normal.      Breath sounds: Normal breath sounds.   Abdominal:      General: Bowel sounds are normal.      Palpations: Abdomen is soft.   Genitourinary:     Comments: Rodriguez  Musculoskeletal:      Cervical back: Neck supple.      Right lower leg: No edema.      Left lower leg: No edema.   Neurological:      Mental Status: She is alert.      Motor: Weakness present.   Psychiatric:         Mood and Affect: Mood normal.         Behavior: Behavior is cooperative.         Assessment/Plan  Problem List Items Addressed This Visit       Cognitive communication deficit     Speech therapy         GERD (gastroesophageal reflux disease)     Controlled  Monitor GI symptoms         Hypertension     Monitor blood pressure  Metoprolol  Hydrochlorothiazide  BP at goal         Weakness - Primary     Continue working in therapy towards  goals         Hyponatremia     Sodium tablets  Monitor BMP          Medications, treatments, and labs reviewed  Continue medications and treatments as listed in EMR      Scribe Attestation  I, Shantel Chen   attest that this documentation has been prepared under the direction and in the presence of Christian Mazariegos MD    Provider Attestation - Scribe documentation  All medical record entries made by the Scribe were at my direction and personally dictated by me. I have reviewed the chart and agree that the record accurately reflects my personal performance of the history, physical exam, discussion and plan.   Christian Mazariegos MD        Electronically Signed By: Christian Mazariegos MD   5/22/24  3:09 PM

## 2024-05-22 NOTE — PROGRESS NOTES
PROGRESS NOTE    Subjective   Chief complaint: Christina Davila is a 83 y.o. female who is an acute skilled patient being seen and evaluated for weakness    HPI:  HPI  Patient does continue to work in therapy due to generalized weakness.  Therapy is working with patient on therapeutic exercises, dynamic balance activities during sitting and static balance activities during sitting on side of bed for 15 minutes.  Patient is also working on bed mobility requiring mod assist x 1.  Patient performing sit to stand from bed to wheelchair requiring mod assist of 1-2.  Patient seen and examined at the bedside, appears to be in no acute distress.  Patient does continue on sodium tablets due to sodium reportedly 126. Continue to monitor BMP    Objective   Vital signs: 126/76, 98.0, 17, 97%    Physical Exam  Constitutional:       General: She is not in acute distress.  Eyes:      Extraocular Movements: Extraocular movements intact.   Cardiovascular:      Rate and Rhythm: Normal rate and regular rhythm.   Pulmonary:      Effort: Pulmonary effort is normal.      Breath sounds: Normal breath sounds.   Abdominal:      General: Bowel sounds are normal.      Palpations: Abdomen is soft.   Genitourinary:     Comments: Rodriguez  Musculoskeletal:      Cervical back: Neck supple.      Right lower leg: No edema.      Left lower leg: No edema.   Neurological:      Mental Status: She is alert.      Motor: Weakness present.   Psychiatric:         Mood and Affect: Mood normal.         Behavior: Behavior is cooperative.         Assessment/Plan   Problem List Items Addressed This Visit       Cognitive communication deficit     Speech therapy         GERD (gastroesophageal reflux disease)     Controlled  Monitor GI symptoms         Hypertension     Monitor blood pressure  Metoprolol  Hydrochlorothiazide  BP at goal         Weakness - Primary     Continue working in therapy towards goals         Hyponatremia     Sodium tablets  Monitor BMP           Medications, treatments, and labs reviewed  Continue medications and treatments as listed in EMR      Scribe Attestation  I, Shantel Chen   attest that this documentation has been prepared under the direction and in the presence of Christian Mazariegos MD    Provider Attestation - Scribe documentation  All medical record entries made by the Scribe were at my direction and personally dictated by me. I have reviewed the chart and agree that the record accurately reflects my personal performance of the history, physical exam, discussion and plan.   Christian Mazariegos MD

## 2024-05-23 ENCOUNTER — NURSING HOME VISIT (OUTPATIENT)
Dept: POST ACUTE CARE | Facility: EXTERNAL LOCATION | Age: 84
End: 2024-05-23
Payer: COMMERCIAL

## 2024-05-23 DIAGNOSIS — F03.90 DEMENTIA, UNSPECIFIED DEMENTIA SEVERITY, UNSPECIFIED DEMENTIA TYPE, UNSPECIFIED WHETHER BEHAVIORAL, PSYCHOTIC, OR MOOD DISTURBANCE OR ANXIETY (MULTI): ICD-10-CM

## 2024-05-23 DIAGNOSIS — K21.9 GASTROESOPHAGEAL REFLUX DISEASE, UNSPECIFIED WHETHER ESOPHAGITIS PRESENT: ICD-10-CM

## 2024-05-23 DIAGNOSIS — I10 HYPERTENSION, UNSPECIFIED TYPE: ICD-10-CM

## 2024-05-23 DIAGNOSIS — E46 MALNUTRITION, UNSPECIFIED TYPE (MULTI): Primary | ICD-10-CM

## 2024-05-23 DIAGNOSIS — F32.A DEPRESSION, UNSPECIFIED DEPRESSION TYPE: ICD-10-CM

## 2024-05-23 DIAGNOSIS — M19.90 ARTHRITIS: ICD-10-CM

## 2024-05-23 DIAGNOSIS — E78.5 HYPERLIPIDEMIA, UNSPECIFIED HYPERLIPIDEMIA TYPE: ICD-10-CM

## 2024-05-23 PROCEDURE — 99309 SBSQ NF CARE MODERATE MDM 30: CPT | Performed by: NURSE PRACTITIONER

## 2024-05-23 NOTE — LETTER
Patient: Christina Davila  : 1940    Encounter Date: 2024    PROGRESS NOTE    Subjective  Chief complaint: Christina Davila is a 83 y.o. female who is an acute skilled patient being seen and evaluated for weakness    HPI: Pleasant and talkative.  Has significant cognitive loss.  Just finished with therapy session.  Has no recall regarding her therapy session.  Requires extensive assistance from staff for all HOC and mobility.   Reviewed current labs.  Nursing reports her intake remains  inconsistent  HPI      Objective  Vital signs: 122/56-72-18-98.5    Physical Exam  Vitals and nursing note reviewed.   Constitutional:       General: She is not in acute distress.     Appearance: She is underweight.   Eyes:      Extraocular Movements: Extraocular movements intact.   Cardiovascular:      Rate and Rhythm: Normal rate and regular rhythm.   Pulmonary:      Effort: Pulmonary effort is normal.      Breath sounds: Normal breath sounds.      Comments: Lungs clear   Abdominal:      General: Bowel sounds are normal.      Palpations: Abdomen is soft.   Musculoskeletal:      Cervical back: Neck supple.      Right lower leg: No edema.      Left lower leg: No edema.      Comments: Both hands with arthritic changes, limited extension in fingers    Neurological:      Mental Status: She is alert.   Psychiatric:         Mood and Affect: Mood normal.         Behavior: Behavior is cooperative.         Assessment/Plan  Problem List Items Addressed This Visit       GERD (gastroesophageal reflux disease)     Controlled  Monitor GI symptoms         Hyperlipidemia     Statin  Monitor lipids and LFTs         Hypertension     Monitor blood pressure  Metoprolol  Hydrochlorothiazide  BP at goal         Depression    Arthritis     Has extensive arthritic changes in upper arms and hands. Has limited mobility - hands contracture    No inflammation          Malnutrition (Multi) - Primary     Underweight   Reports poor appetite   Monitor     Encourage supplements          Dementia (Multi)     Has extensive cognitive loss   Limited STM    Has communication deficit   ST to follow   Encourage all abilities           Medications, treatments, and labs reviewed  Continue medications and treatments as listed in EMR    MARY Munoz        Electronically Signed By: MARY Munoz   5/25/24 12:11 PM

## 2024-05-24 NOTE — PROGRESS NOTES
PROGRESS NOTE    Subjective   Chief complaint: Christina Davila is a 83 y.o. female who is an acute skilled patient being seen and evaluated for weakness    HPI: Pleasant and talkative.  Has significant cognitive loss.  Just finished with therapy session.  Has no recall regarding her therapy session.  Requires extensive assistance from staff for all HOC and mobility.   Reviewed current labs.  Nursing reports her intake remains  inconsistent  HPI      Objective   Vital signs: 122/56-72-18-98.5    Physical Exam  Vitals and nursing note reviewed.   Constitutional:       General: She is not in acute distress.     Appearance: She is underweight.   Eyes:      Extraocular Movements: Extraocular movements intact.   Cardiovascular:      Rate and Rhythm: Normal rate and regular rhythm.   Pulmonary:      Effort: Pulmonary effort is normal.      Breath sounds: Normal breath sounds.      Comments: Lungs clear   Abdominal:      General: Bowel sounds are normal.      Palpations: Abdomen is soft.   Musculoskeletal:      Cervical back: Neck supple.      Right lower leg: No edema.      Left lower leg: No edema.      Comments: Both hands with arthritic changes, limited extension in fingers    Neurological:      Mental Status: She is alert.   Psychiatric:         Mood and Affect: Mood normal.         Behavior: Behavior is cooperative.         Assessment/Plan   Problem List Items Addressed This Visit       GERD (gastroesophageal reflux disease)     Controlled  Monitor GI symptoms         Hyperlipidemia     Statin  Monitor lipids and LFTs         Hypertension     Monitor blood pressure  Metoprolol  Hydrochlorothiazide  BP at goal         Depression    Arthritis     Has extensive arthritic changes in upper arms and hands. Has limited mobility - hands contracture    No inflammation          Malnutrition (Multi) - Primary     Underweight   Reports poor appetite   Monitor    Encourage supplements          Dementia (Multi)     Has extensive  cognitive loss   Limited STM    Has communication deficit   ST to follow   Encourage all abilities           Medications, treatments, and labs reviewed  Continue medications and treatments as listed in EMR    Stella Duncan, APRN-CNP

## 2024-05-25 PROBLEM — F03.90 DEMENTIA (MULTI): Status: ACTIVE | Noted: 2024-05-14

## 2024-05-25 PROBLEM — F03.90 DEMENTIA (MULTI): Status: ACTIVE | Noted: 2024-05-25

## 2024-05-25 NOTE — ASSESSMENT & PLAN NOTE
Has extensive cognitive loss   Limited STM    Has communication deficit   ST to follow   Encourage all abilities

## 2024-05-25 NOTE — ASSESSMENT & PLAN NOTE
Has extensive arthritic changes in upper arms and hands. Has limited mobility - hands contracture    No inflammation

## 2024-05-27 ENCOUNTER — NURSING HOME VISIT (OUTPATIENT)
Dept: POST ACUTE CARE | Facility: EXTERNAL LOCATION | Age: 84
End: 2024-05-27
Payer: COMMERCIAL

## 2024-05-27 DIAGNOSIS — I10 HYPERTENSION, UNSPECIFIED TYPE: ICD-10-CM

## 2024-05-27 DIAGNOSIS — K21.9 GASTROESOPHAGEAL REFLUX DISEASE, UNSPECIFIED WHETHER ESOPHAGITIS PRESENT: ICD-10-CM

## 2024-05-27 DIAGNOSIS — R53.1 WEAKNESS: ICD-10-CM

## 2024-05-27 DIAGNOSIS — F03.90 DEMENTIA, UNSPECIFIED DEMENTIA SEVERITY, UNSPECIFIED DEMENTIA TYPE, UNSPECIFIED WHETHER BEHAVIORAL, PSYCHOTIC, OR MOOD DISTURBANCE OR ANXIETY (MULTI): ICD-10-CM

## 2024-05-27 DIAGNOSIS — E10.9 TYPE 1 DIABETES MELLITUS WITHOUT COMPLICATION (MULTI): ICD-10-CM

## 2024-05-27 PROCEDURE — 99308 SBSQ NF CARE LOW MDM 20: CPT | Performed by: INTERNAL MEDICINE

## 2024-05-27 NOTE — LETTER
Patient: Christina Davila  : 1940    Encounter Date: 2024    PROGRESS NOTE    Subjective  Chief complaint: Christina Davila is a 83 y.o. female who is an acute skilled patient being seen and evaluated for weakness    HPI:  Patient seen and examined at bedside.  Patient denies n/v/f/c.  Continues working in therapy. ST to focus on exercises to increase naming/word finding skills, facilitatation of ability to follow verbal instruction during daily living tasks and graded choice presentation to facilitate receptive/expressive abilities use circumlocution strategy, use function description strategy and request clarification. Patient completed dynamic balance activities during sitting, training in rolling, scooting, bridging to facilitate (I) bed mobility, transfer training to increase functional task performance, facilitation of postural control and crossing midline to facilitate independence in functional skill performance with on hand placement with cuing.  No new complaints.      Objective  Vital signs: 111/73,76,97%,     Physical Exam  Constitutional:       General: She is not in acute distress.  Eyes:      Extraocular Movements: Extraocular movements intact.   Cardiovascular:      Rate and Rhythm: Normal rate and regular rhythm.   Pulmonary:      Effort: Pulmonary effort is normal.      Breath sounds: Normal breath sounds.   Abdominal:      General: Bowel sounds are normal.      Palpations: Abdomen is soft.   Genitourinary:     Comments: Rodriguez  Musculoskeletal:      Cervical back: Neck supple.      Right lower leg: No edema.      Left lower leg: No edema.   Neurological:      Mental Status: She is alert.      Motor: Weakness present.   Psychiatric:         Mood and Affect: Mood normal.         Behavior: Behavior is cooperative.         Assessment/Plan  Problem List Items Addressed This Visit       Diabetes mellitus (Multi)     Monitor fasting blood sugar  Glucoscans         GERD (gastroesophageal reflux  disease)     Controlled  Monitor GI symptoms         Hypertension     Monitor blood pressure  Metoprolol  Hydrochlorothiazide  BP at goal         Weakness     Continue working in therapy towards goals         Dementia (Multi)     ST for cognition  Monitor safety          Medications, treatments, and labs reviewed  Continue medications and treatments as listed in EMR    Scribe Attestation  Chiquita JAY Scribe   attest that this documentation has been prepared under the direction and in the presence of Christian Mazariegos MD.     Provider Attestation - Scribe documentation  All medical record entries made by the Scribe were at my direction and personally dictated by me. I have reviewed the chart and agree that the record accurately reflects my personal performance of the history, physical exam, discussion and plan.   Christian Mazariegos MD      Electronically Signed By: Christian Mazariegos MD   5/28/24  6:10 PM

## 2024-05-28 ENCOUNTER — NURSING HOME VISIT (OUTPATIENT)
Dept: POST ACUTE CARE | Facility: EXTERNAL LOCATION | Age: 84
End: 2024-05-28
Payer: COMMERCIAL

## 2024-05-28 DIAGNOSIS — F03.90 DEMENTIA, UNSPECIFIED DEMENTIA SEVERITY, UNSPECIFIED DEMENTIA TYPE, UNSPECIFIED WHETHER BEHAVIORAL, PSYCHOTIC, OR MOOD DISTURBANCE OR ANXIETY (MULTI): Primary | ICD-10-CM

## 2024-05-28 DIAGNOSIS — M19.90 ARTHRITIS: ICD-10-CM

## 2024-05-28 DIAGNOSIS — E10.9 TYPE 1 DIABETES MELLITUS WITHOUT COMPLICATION (MULTI): ICD-10-CM

## 2024-05-28 DIAGNOSIS — F32.A DEPRESSION, UNSPECIFIED DEPRESSION TYPE: ICD-10-CM

## 2024-05-28 DIAGNOSIS — R53.1 WEAKNESS: ICD-10-CM

## 2024-05-28 PROCEDURE — 99309 SBSQ NF CARE MODERATE MDM 30: CPT | Performed by: NURSE PRACTITIONER

## 2024-05-28 NOTE — ASSESSMENT & PLAN NOTE
Continue working in therapy towards goals  PT reports that she has made progress    Has extensive arthritic changes in hands and arms, limited functional mobility

## 2024-05-28 NOTE — PROGRESS NOTES
PROGRESS NOTE    Subjective   Chief complaint: Christina Davila is a 83 y.o. female who is an acute skilled patient being seen and evaluated for weakness    HPI: Remains in therapy.  He is pleasant and talkative.  Just finished lunch.  Has no recall of what she ate.  Caregiver reports that her appetite was good.  He is asking to go back to bed.  Reports that she is tired.    Had recent KUB done. No impaction.  No abnormalities identified.    Nursing reports that she had been complaining of some gastric distress over the weekend.  The caregiver reports that she has been having regular stools on a daily basis . Denies any tenderness.   HPI      Objective   Vital signs: 123/68-77-18-97.3    Physical Exam  Vitals and nursing note reviewed.   Constitutional:       General: She is not in acute distress.  Eyes:      Extraocular Movements: Extraocular movements intact.   Cardiovascular:      Rate and Rhythm: Normal rate and regular rhythm.   Pulmonary:      Effort: Pulmonary effort is normal.      Breath sounds: Normal breath sounds.   Abdominal:      General: Bowel sounds are normal.      Palpations: Abdomen is soft.      Comments: Soft, not distended.   BS x 4 active    Musculoskeletal:      Cervical back: Neck supple.      Right lower leg: No edema.      Left lower leg: No edema.   Neurological:      Mental Status: She is alert.   Psychiatric:         Mood and Affect: Mood normal.         Behavior: Behavior is cooperative.         Cognition and Memory: Cognition is impaired. Memory is impaired.         Assessment/Plan   Problem List Items Addressed This Visit       Diabetes mellitus (Multi)     Monitor fasting blood sugar  Glucoscans         Weakness     Continue working in therapy towards goals  PT reports that she has made progress    Has extensive arthritic changes in hands and arms, limited functional mobility         Depression     Monitor mood and behaviors  Quetiapine          Arthritis     Has extensive arthritic  changes in upper arms and hands. Has limited mobility - hands contracture    No inflammation          Dementia (Multi) - Primary     ST for cognition  Monitor safety  Memory poor   Cognitive loss          Medications, treatments, and labs reviewed  Continue medications and treatments as listed in EMR    Stella Duncan, APRN-CNP

## 2024-05-28 NOTE — ASSESSMENT & PLAN NOTE
Assume care of patient, patient alert and oriented x 4, skin warm and dry, police at bedside Monitor mood and behaviors  Quetiapine

## 2024-05-28 NOTE — LETTER
Patient: Christina Davila  : 1940    Encounter Date: 2024    PROGRESS NOTE    Subjective  Chief complaint: Christina Davila is a 83 y.o. female who is an acute skilled patient being seen and evaluated for weakness    HPI: Remains in therapy.  He is pleasant and talkative.  Just finished lunch.  Has no recall of what she ate.  Caregiver reports that her appetite was good.  He is asking to go back to bed.  Reports that she is tired.    Had recent KUB done. No impaction.  No abnormalities identified.    Nursing reports that she had been complaining of some gastric distress over the weekend.  The caregiver reports that she has been having regular stools on a daily basis . Denies any tenderness.   HPI      Objective  Vital signs: 123/68-77-18-97.3    Physical Exam  Vitals and nursing note reviewed.   Constitutional:       General: She is not in acute distress.  Eyes:      Extraocular Movements: Extraocular movements intact.   Cardiovascular:      Rate and Rhythm: Normal rate and regular rhythm.   Pulmonary:      Effort: Pulmonary effort is normal.      Breath sounds: Normal breath sounds.   Abdominal:      General: Bowel sounds are normal.      Palpations: Abdomen is soft.      Comments: Soft, not distended.   BS x 4 active    Musculoskeletal:      Cervical back: Neck supple.      Right lower leg: No edema.      Left lower leg: No edema.   Neurological:      Mental Status: She is alert.   Psychiatric:         Mood and Affect: Mood normal.         Behavior: Behavior is cooperative.         Cognition and Memory: Cognition is impaired. Memory is impaired.         Assessment/Plan  Problem List Items Addressed This Visit       Diabetes mellitus (Multi)     Monitor fasting blood sugar  Glucoscans         Weakness     Continue working in therapy towards goals  PT reports that she has made progress    Has extensive arthritic changes in hands and arms, limited functional mobility         Depression     Monitor mood and  Physical Therapy Visit    Referred by: Leatha Baum MD; Medical Diagnosis (from order):    Diagnosis Information      Diagnosis    724.2, 724.3, 338.29 (ICD-9-CM) - M54.41, G89.29 (ICD-10-CM) - Chronic bilateral low back pain with right-sided sciatica              Visit: 2    Visit Type: Daily Treatment Note    SUBJECTIVE                                                                                                               Everything is going okay. Pain shifting over to left lower extremity too. Feels like pain is making her weak. Still reporting continued pain (sharp/shooting) down into right buttock and down right lower extremity.   Pain / Symptoms:  Pain/symptom is: intermittent  Pain rating (out of 10): Current: 7     OBJECTIVE                                                                                                                        TREATMENT                                                                                                                  Therapeutic Exercise:  Subjective interview   Discussed hook-lying position with legs propped up for decompression  Lower Trunk Rotation x1-3 minutes   Supine piriformis \"pull\" stretch 2 x 30 seconds   Supine piriformis \"push\" stretch   Bent Knee Fall-outs x1-3 minutes   Supine sciatic nerve glide on R/L    Rock tape for lumbar support and postural awareness- two horizontal pieces with 50% strength along lower lumbar paraspinals, one horizontal piece along L4-L5- Held today.     Skilled input: verbal instruction/cues and tactile instruction/cues    Writer verbally educated and received verbal consent for hand placement, positioning of patient, and techniques to be performed today from patient for clothing adjustments for techniques, hand placement and palpation for techniques and therapist position for techniques as described above and how they are pertinent to the patient's plan of care.    Home Exercise Program/Education Materials:  Access Code: [email or web address]  URL: [email or web address]  Date: 08/26/2022  Prepared by: Rod Albert    Exercises  · Supine Lower Trunk Rotation - 1 x daily - 7 x weekly - 3 sets - 10 reps  · Bent Knee Fallouts with Alternating Legs - 1 x daily - 7 x weekly - 3 sets - 10 reps  · Supine Piriformis Stretch with Foot on Ground - 1 x daily - 7 x weekly - 3 sets - 30 seconds hold  · Supine Piriformis Stretch with Foot on Ground - 1 x daily - 7 x weekly - 3 sets - 30 seconds hold  · Supine Sciatic Nerve Glide - 1 x daily - 7 x weekly - 2-3 sets - 10 reps  · Supine Figure 4 Piriformis Stretch - 1 x daily - 7 x weekly - 3 sets - 10 reps                    Un-attended Electrical Stimulation (94291/): Interferential Current  Purpose: pain relief  Location: Lumbar spine   Position: prone  Pulse Rate:  Hz  Intensity: patient tolerance  Electrode Placement: X- pattern   In conjunction with: moist hot pack  Duration: 15 minutes  Results: decreased pain  Reaction: no adverse reaction to treatment      ASSESSMENT                                                                                                             Significant time spent reviewing previous HEP and progressing as able. Some increased radicular symptoms on right side > left. Trial of IFC and heat for pain management. Responded well with reduction in symptoms upon completion. Await MRI results. Patient would benefit from continued skilled physical therapy to address functional limitations noted above.     Patient Education:   Results of above outlined education: Verbalizes understanding and Needs reinforcement      PLAN                                                                                                                           Suggestions for next session as indicated: Progress per plan of care:  STM, IFC as indicated   Hold on traction for now   Review New            Therapy procedure time and total treatment time can  behaviors  Quetiapine          Arthritis     Has extensive arthritic changes in upper arms and hands. Has limited mobility - hands contracture    No inflammation          Dementia (Multi) - Primary     ST for cognition  Monitor safety  Memory poor   Cognitive loss          Medications, treatments, and labs reviewed  Continue medications and treatments as listed in EMR    MARY Munoz      Electronically Signed By: MARY Munoz   5/28/24  5:31 PM   be found documented on the Time Entry flowsheet

## 2024-05-28 NOTE — PROGRESS NOTES
PROGRESS NOTE    Subjective   Chief complaint: Christina Davila is a 83 y.o. female who is an acute skilled patient being seen and evaluated for weakness    HPI:  Patient seen and examined at bedside.  Patient denies n/v/f/c.  Continues working in therapy. ST to focus on exercises to increase naming/word finding skills, facilitatation of ability to follow verbal instruction during daily living tasks and graded choice presentation to facilitate receptive/expressive abilities use circumlocution strategy, use function description strategy and request clarification. Patient completed dynamic balance activities during sitting, training in rolling, scooting, bridging to facilitate (I) bed mobility, transfer training to increase functional task performance, facilitation of postural control and crossing midline to facilitate independence in functional skill performance with on hand placement with cuing.  No new complaints.      Objective   Vital signs: 111/73,76,97%,     Physical Exam  Constitutional:       General: She is not in acute distress.  Eyes:      Extraocular Movements: Extraocular movements intact.   Cardiovascular:      Rate and Rhythm: Normal rate and regular rhythm.   Pulmonary:      Effort: Pulmonary effort is normal.      Breath sounds: Normal breath sounds.   Abdominal:      General: Bowel sounds are normal.      Palpations: Abdomen is soft.   Genitourinary:     Comments: Rodriguez  Musculoskeletal:      Cervical back: Neck supple.      Right lower leg: No edema.      Left lower leg: No edema.   Neurological:      Mental Status: She is alert.      Motor: Weakness present.   Psychiatric:         Mood and Affect: Mood normal.         Behavior: Behavior is cooperative.         Assessment/Plan   Problem List Items Addressed This Visit       Diabetes mellitus (Multi)     Monitor fasting blood sugar  Glucoscans         GERD (gastroesophageal reflux disease)     Controlled  Monitor GI symptoms         Hypertension      Monitor blood pressure  Metoprolol  Hydrochlorothiazide  BP at goal         Weakness     Continue working in therapy towards goals         Dementia (Multi)     ST for cognition  Monitor safety          Medications, treatments, and labs reviewed  Continue medications and treatments as listed in EMR    Scribe Attestation  Chiquita JAY Scribe   attest that this documentation has been prepared under the direction and in the presence of Christian Mazariegos MD.     Provider Attestation - Scribe documentation  All medical record entries made by the Scribe were at my direction and personally dictated by me. I have reviewed the chart and agree that the record accurately reflects my personal performance of the history, physical exam, discussion and plan.   Christian Mazariegos MD

## 2024-05-29 ENCOUNTER — NURSING HOME VISIT (OUTPATIENT)
Dept: POST ACUTE CARE | Facility: EXTERNAL LOCATION | Age: 84
End: 2024-05-29
Payer: COMMERCIAL

## 2024-05-29 DIAGNOSIS — K21.9 GASTROESOPHAGEAL REFLUX DISEASE, UNSPECIFIED WHETHER ESOPHAGITIS PRESENT: ICD-10-CM

## 2024-05-29 DIAGNOSIS — E10.9 TYPE 1 DIABETES MELLITUS WITHOUT COMPLICATION (MULTI): ICD-10-CM

## 2024-05-29 DIAGNOSIS — F03.90 DEMENTIA, UNSPECIFIED DEMENTIA SEVERITY, UNSPECIFIED DEMENTIA TYPE, UNSPECIFIED WHETHER BEHAVIORAL, PSYCHOTIC, OR MOOD DISTURBANCE OR ANXIETY (MULTI): ICD-10-CM

## 2024-05-29 DIAGNOSIS — I10 HYPERTENSION, UNSPECIFIED TYPE: ICD-10-CM

## 2024-05-29 DIAGNOSIS — R53.1 WEAKNESS: Primary | ICD-10-CM

## 2024-05-29 PROCEDURE — 99308 SBSQ NF CARE LOW MDM 20: CPT | Performed by: INTERNAL MEDICINE

## 2024-05-29 NOTE — LETTER
Patient: Christina Davila  : 1940    Encounter Date: 2024    PROGRESS NOTE    Subjective  Chief complaint: Christina Davila is a 83 y.o. female who is an acute skilled patient being seen and evaluated for weakness    HPI:  HPI  Patient is continue to work in therapy.  Patient is performing therapeutic exercises sitting edge of bed unsupported to increase lower extremity strength and endurance and promote general circulation.  Patient is also working on sit to stand x 2 from wheelchair to outside parallel bars with max assist to mod assist.  Speech therapy work with patient to address cognition.  Patient seen and examined at the bedside, appears to be in no acute distress.    Objective  Vital signs: 120/77, 98.3, 80, 17, 98%, blood sugar 125    Physical Exam  Constitutional:       General: She is not in acute distress.  Eyes:      Extraocular Movements: Extraocular movements intact.   Cardiovascular:      Rate and Rhythm: Normal rate and regular rhythm.   Pulmonary:      Effort: Pulmonary effort is normal.      Breath sounds: Normal breath sounds.   Abdominal:      General: Bowel sounds are normal.      Palpations: Abdomen is soft.   Musculoskeletal:      Cervical back: Neck supple.      Right lower leg: No edema.      Left lower leg: No edema.   Neurological:      Mental Status: She is alert.      Motor: Weakness present.   Psychiatric:         Mood and Affect: Mood normal.         Behavior: Behavior is cooperative.         Assessment/Plan  Problem List Items Addressed This Visit       Diabetes mellitus (Multi)     Monitor fasting blood sugar  Glucoscans         GERD (gastroesophageal reflux disease)     Controlled  Monitor GI symptoms         Hypertension     Monitor blood pressure  Metoprolol  Hydrochlorothiazide  BP at goal         Weakness - Primary     Continue to work in therapy towards established goals         Dementia (Multi)     ST for cognition  Monitor safety  Memory poor   Cognitive loss           Medications, treatments, and labs reviewed  Continue medications and treatments as listed in EMR      Scribe Attestation  I, Shantel Chen   attest that this documentation has been prepared under the direction and in the presence of Christian Mazariegos MD    Provider Attestation - Scribe documentation  All medical record entries made by the Scribe were at my direction and personally dictated by me. I have reviewed the chart and agree that the record accurately reflects my personal performance of the history, physical exam, discussion and plan.   Christian Mazariegos MD        Electronically Signed By: Christian Mazariegos MD   5/29/24  5:55 PM

## 2024-05-29 NOTE — PROGRESS NOTES
PROGRESS NOTE    Subjective   Chief complaint: Christina Davila is a 83 y.o. female who is an acute skilled patient being seen and evaluated for weakness    HPI:  HPI  Patient is continue to work in therapy.  Patient is performing therapeutic exercises sitting edge of bed unsupported to increase lower extremity strength and endurance and promote general circulation.  Patient is also working on sit to stand x 2 from wheelchair to outside parallel bars with max assist to mod assist.  Speech therapy work with patient to address cognition.  Patient seen and examined at the bedside, appears to be in no acute distress.    Objective   Vital signs: 120/77, 98.3, 80, 17, 98%, blood sugar 125    Physical Exam  Constitutional:       General: She is not in acute distress.  Eyes:      Extraocular Movements: Extraocular movements intact.   Cardiovascular:      Rate and Rhythm: Normal rate and regular rhythm.   Pulmonary:      Effort: Pulmonary effort is normal.      Breath sounds: Normal breath sounds.   Abdominal:      General: Bowel sounds are normal.      Palpations: Abdomen is soft.   Musculoskeletal:      Cervical back: Neck supple.      Right lower leg: No edema.      Left lower leg: No edema.   Neurological:      Mental Status: She is alert.      Motor: Weakness present.   Psychiatric:         Mood and Affect: Mood normal.         Behavior: Behavior is cooperative.         Assessment/Plan   Problem List Items Addressed This Visit       Diabetes mellitus (Multi)     Monitor fasting blood sugar  Glucoscans         GERD (gastroesophageal reflux disease)     Controlled  Monitor GI symptoms         Hypertension     Monitor blood pressure  Metoprolol  Hydrochlorothiazide  BP at goal         Weakness - Primary     Continue to work in therapy towards established goals         Dementia (Multi)     ST for cognition  Monitor safety  Memory poor   Cognitive loss          Medications, treatments, and labs reviewed  Continue medications and  treatments as listed in EMR      Scribe Attestation  I, Shantel Chen   attest that this documentation has been prepared under the direction and in the presence of Christian Mazariegos MD    Provider Attestation - Scribe documentation  All medical record entries made by the Scribe were at my direction and personally dictated by me. I have reviewed the chart and agree that the record accurately reflects my personal performance of the history, physical exam, discussion and plan.   Christian Mazariegos MD

## 2024-05-30 ENCOUNTER — NURSING HOME VISIT (OUTPATIENT)
Dept: POST ACUTE CARE | Facility: EXTERNAL LOCATION | Age: 84
End: 2024-05-30
Payer: COMMERCIAL

## 2024-05-30 DIAGNOSIS — E10.9 TYPE 1 DIABETES MELLITUS WITHOUT COMPLICATION (MULTI): ICD-10-CM

## 2024-05-30 DIAGNOSIS — R53.1 WEAKNESS: ICD-10-CM

## 2024-05-30 DIAGNOSIS — K21.9 GASTROESOPHAGEAL REFLUX DISEASE, UNSPECIFIED WHETHER ESOPHAGITIS PRESENT: ICD-10-CM

## 2024-05-30 DIAGNOSIS — M19.90 ARTHRITIS: ICD-10-CM

## 2024-05-30 DIAGNOSIS — F03.90 DEMENTIA, UNSPECIFIED DEMENTIA SEVERITY, UNSPECIFIED DEMENTIA TYPE, UNSPECIFIED WHETHER BEHAVIORAL, PSYCHOTIC, OR MOOD DISTURBANCE OR ANXIETY (MULTI): ICD-10-CM

## 2024-05-30 DIAGNOSIS — E78.5 HYPERLIPIDEMIA, UNSPECIFIED HYPERLIPIDEMIA TYPE: ICD-10-CM

## 2024-05-30 DIAGNOSIS — F32.A DEPRESSION, UNSPECIFIED DEPRESSION TYPE: ICD-10-CM

## 2024-05-30 DIAGNOSIS — I10 HYPERTENSION, UNSPECIFIED TYPE: ICD-10-CM

## 2024-05-30 DIAGNOSIS — L03.90 CELLULITIS, UNSPECIFIED CELLULITIS SITE: ICD-10-CM

## 2024-05-30 DIAGNOSIS — E46 MALNUTRITION, UNSPECIFIED TYPE (MULTI): Primary | ICD-10-CM

## 2024-05-30 PROCEDURE — 99309 SBSQ NF CARE MODERATE MDM 30: CPT | Performed by: NURSE PRACTITIONER

## 2024-05-30 NOTE — LETTER
Patient: Christina Davila  : 1940    Encounter Date: 2024    PROGRESS NOTE    Subjective  Chief complaint: Chrsitina Davila is a 83 y.o. female who is an acute skilled patient being seen and evaluated for weakness    HPI: the therapist reports that she  has been refusing therapy sessions.    Nursing reports that she prefers to remain in bed and her appetite remains inconsistent.    She is currently in bed.  Reports that she is comfortable. Smiling when approached.   Denies any chest pain or tightness     HPI      Objective  Vital signs: 127/65-72-18-98.8    Physical Exam  Vitals and nursing note reviewed.   Constitutional:       General: She is not in acute distress.  Eyes:      Extraocular Movements: Extraocular movements intact.   Cardiovascular:      Rate and Rhythm: Normal rate and regular rhythm.   Pulmonary:      Effort: Pulmonary effort is normal.      Breath sounds: Normal breath sounds.   Abdominal:      General: Bowel sounds are normal.      Palpations: Abdomen is soft.      Comments: Soft, not distended.   BS x 4 active    Musculoskeletal:      Cervical back: Neck supple.      Right lower leg: No edema.      Left lower leg: No edema.   Neurological:      Mental Status: She is alert.   Psychiatric:         Mood and Affect: Mood normal.         Behavior: Behavior is cooperative.         Cognition and Memory: Cognition is impaired. Memory is impaired.         Assessment/Plan  Problem List Items Addressed This Visit       Cellulitis     Continue antibiotic until complete  Wound care is following wound care needs          Diabetes mellitus (Multi)     Monitor fasting blood sugar  Glucoscans         GERD (gastroesophageal reflux disease)     Controlled  Monitor GI symptoms         Hyperlipidemia     Statin  Monitor lipids and LFTs         Hypertension     Monitor blood pressure  Metoprolol  Hydrochlorothiazide  BP at goal         Weakness     Therapist reports that she is refusing therapy sessions    Continues to requires extensive assistance from staff for all HOC and mobility  Has extensive arthritic changes in hands and arms, limited functional mobility  SW reported that -plan is for her to return home with her spouse has private caregiver          Depression     Monitor mood and behaviors  Quetiapine          Arthritis     Has extensive arthritic changes in upper arms and hands. Has limited mobility - hands contracture    No inflammation   Tylenol prn          Malnutrition (Multi) - Primary     Underweight   Reports inconsistent  appetite   Monitor    Encourage supplements          Dementia (Multi)     ST for cognition  Monitor safety  Memory poor   Extensive Cognitive loss          Medications, treatments, and labs reviewed  Continue medications and treatments as listed in EMR    MARY Munoz      Electronically Signed By: MARY Munoz   6/1/24 10:01 AM

## 2024-05-31 NOTE — PROGRESS NOTES
PROGRESS NOTE    Subjective   Chief complaint: Christina Davila is a 83 y.o. female who is an acute skilled patient being seen and evaluated for weakness    HPI: the therapist reports that she  has been refusing therapy sessions.    Nursing reports that she prefers to remain in bed and her appetite remains inconsistent.    She is currently in bed.  Reports that she is comfortable. Smiling when approached.   Denies any chest pain or tightness     HPI      Objective   Vital signs: 127/65-72-18-98.8    Physical Exam  Vitals and nursing note reviewed.   Constitutional:       General: She is not in acute distress.  Eyes:      Extraocular Movements: Extraocular movements intact.   Cardiovascular:      Rate and Rhythm: Normal rate and regular rhythm.   Pulmonary:      Effort: Pulmonary effort is normal.      Breath sounds: Normal breath sounds.   Abdominal:      General: Bowel sounds are normal.      Palpations: Abdomen is soft.      Comments: Soft, not distended.   BS x 4 active    Musculoskeletal:      Cervical back: Neck supple.      Right lower leg: No edema.      Left lower leg: No edema.   Neurological:      Mental Status: She is alert.   Psychiatric:         Mood and Affect: Mood normal.         Behavior: Behavior is cooperative.         Cognition and Memory: Cognition is impaired. Memory is impaired.         Assessment/Plan   Problem List Items Addressed This Visit       Cellulitis     Continue antibiotic until complete  Wound care is following wound care needs          Diabetes mellitus (Multi)     Monitor fasting blood sugar  Glucoscans         GERD (gastroesophageal reflux disease)     Controlled  Monitor GI symptoms         Hyperlipidemia     Statin  Monitor lipids and LFTs         Hypertension     Monitor blood pressure  Metoprolol  Hydrochlorothiazide  BP at goal         Weakness     Therapist reports that she is refusing therapy sessions   Continues to requires extensive assistance from staff for all HOC and  mobility  Has extensive arthritic changes in hands and arms, limited functional mobility  SW reported that -plan is for her to return home with her spouse has private caregiver          Depression     Monitor mood and behaviors  Quetiapine          Arthritis     Has extensive arthritic changes in upper arms and hands. Has limited mobility - hands contracture    No inflammation   Tylenol prn          Malnutrition (Multi) - Primary     Underweight   Reports inconsistent  appetite   Monitor    Encourage supplements          Dementia (Multi)     ST for cognition  Monitor safety  Memory poor   Extensive Cognitive loss          Medications, treatments, and labs reviewed  Continue medications and treatments as listed in EMR    Stella Duncan, APRN-CNP

## 2024-06-01 NOTE — ASSESSMENT & PLAN NOTE
Therapist reports that she is refusing therapy sessions   Continues to requires extensive assistance from staff for all HOC and mobility  Has extensive arthritic changes in hands and arms, limited functional mobility  SW reported that -plan is for her to return home with her spouse has private caregiver

## 2024-06-01 NOTE — ASSESSMENT & PLAN NOTE
Has extensive arthritic changes in upper arms and hands. Has limited mobility - hands contracture    No inflammation   Tylenol prn

## 2024-06-01 NOTE — ASSESSMENT & PLAN NOTE
Continue antibiotic until complete  Wound care is following wound care needs    Patient arrives to ER with family, per parents patient has been on 5 day drinking binge. Patient obviously intoxicated at time of arrival to room. Patient crying, stating \"wheres my boyfriend. \" patient in 4 point restraints at this time.

## 2024-06-03 ENCOUNTER — NURSING HOME VISIT (OUTPATIENT)
Dept: POST ACUTE CARE | Facility: EXTERNAL LOCATION | Age: 84
End: 2024-06-03
Payer: COMMERCIAL

## 2024-06-03 DIAGNOSIS — R53.1 WEAKNESS: Primary | ICD-10-CM

## 2024-06-03 DIAGNOSIS — F03.90 DEMENTIA, UNSPECIFIED DEMENTIA SEVERITY, UNSPECIFIED DEMENTIA TYPE, UNSPECIFIED WHETHER BEHAVIORAL, PSYCHOTIC, OR MOOD DISTURBANCE OR ANXIETY (MULTI): ICD-10-CM

## 2024-06-03 DIAGNOSIS — I10 HYPERTENSION, UNSPECIFIED TYPE: ICD-10-CM

## 2024-06-03 DIAGNOSIS — F32.A DEPRESSION, UNSPECIFIED DEPRESSION TYPE: ICD-10-CM

## 2024-06-03 DIAGNOSIS — E10.9 TYPE 1 DIABETES MELLITUS WITHOUT COMPLICATION (MULTI): ICD-10-CM

## 2024-06-03 DIAGNOSIS — K21.9 GASTROESOPHAGEAL REFLUX DISEASE, UNSPECIFIED WHETHER ESOPHAGITIS PRESENT: ICD-10-CM

## 2024-06-03 PROCEDURE — 99308 SBSQ NF CARE LOW MDM 20: CPT | Performed by: INTERNAL MEDICINE

## 2024-06-03 NOTE — PROGRESS NOTES
PROGRESS NOTE    Subjective   Chief complaint: Christina Davila is a 83 y.o. female who is an acute skilled patient being seen and evaluated for weakness    HPI:  Patient has been working in therapy to improve strength, endurance, and ADLs.  Patient continues to work toward goals. Pt able to maintain full sidelying position with CGA. Pt performed supine to seated transfer with modA/Yasir.  Pt performed anterior scooting fwd using weight shifting strategy with SBA to Yasir with min/mod VC. Pt performed SPT from EOB to WC with maxA x2 with max VC and mod tactile cueing for LE sequencing/contralateral weight shifting with 25% carryover. Pt displayed impulsivity with descending on to stable surface with out VC. Max standing tolerance- 2:15. No new concerns today.  Denies n/v/f/c pain.        Objective   Vital signs: 111/61,72,95%,     Physical Exam  Constitutional:       General: She is not in acute distress.  Eyes:      Extraocular Movements: Extraocular movements intact.   Cardiovascular:      Rate and Rhythm: Normal rate and regular rhythm.   Pulmonary:      Effort: Pulmonary effort is normal.      Breath sounds: Normal breath sounds.   Abdominal:      General: Bowel sounds are normal.      Palpations: Abdomen is soft.   Musculoskeletal:      Cervical back: Neck supple.      Right lower leg: No edema.      Left lower leg: No edema.   Neurological:      Mental Status: She is alert.      Motor: Weakness present.   Psychiatric:         Mood and Affect: Mood normal.         Behavior: Behavior is cooperative.         Assessment/Plan   Problem List Items Addressed This Visit       Diabetes mellitus (Multi)     Monitor fasting blood sugar  Glucoscans         GERD (gastroesophageal reflux disease)     Controlled  Monitor GI symptoms         Hypertension     Monitor blood pressure  Metoprolol  Hydrochlorothiazide  BP at goal         Weakness - Primary     Continue therapy          Depression     Monitor mood and  behaviors  Quetiapine          Dementia (Multi)     ST for cognition  Monitor safety  Memory poor   Extensive Cognitive loss          Medications, treatments, and labs reviewed  Continue medications and treatments as listed in EMR    Scribe Attestation  PIERRE, Shantel Valentin   attest that this documentation has been prepared under the direction and in the presence of Christian Mazariegos MD.     Provider Attestation - Scribe documentation  All medical record entries made by the Scribe were at my direction and personally dictated by me. I have reviewed the chart and agree that the record accurately reflects my personal performance of the history, physical exam, discussion and plan.   Christian Mazariegos MD

## 2024-06-03 NOTE — LETTER
Patient: Christina Davila  : 1940    Encounter Date: 2024    PROGRESS NOTE    Subjective  Chief complaint: Christina Davila is a 83 y.o. female who is an acute skilled patient being seen and evaluated for weakness    HPI:  Patient has been working in therapy to improve strength, endurance, and ADLs.  Patient continues to work toward goals. Pt able to maintain full sidelying position with CGA. Pt performed supine to seated transfer with modA/Yasir.  Pt performed anterior scooting fwd using weight shifting strategy with SBA to Yasir with min/mod VC. Pt performed SPT from EOB to WC with maxA x2 with max VC and mod tactile cueing for LE sequencing/contralateral weight shifting with 25% carryover. Pt displayed impulsivity with descending on to stable surface with out VC. Max standing tolerance- 2:15. No new concerns today.  Denies n/v/f/c pain.        Objective  Vital signs: 111/61,72,95%,     Physical Exam  Constitutional:       General: She is not in acute distress.  Eyes:      Extraocular Movements: Extraocular movements intact.   Cardiovascular:      Rate and Rhythm: Normal rate and regular rhythm.   Pulmonary:      Effort: Pulmonary effort is normal.      Breath sounds: Normal breath sounds.   Abdominal:      General: Bowel sounds are normal.      Palpations: Abdomen is soft.   Musculoskeletal:      Cervical back: Neck supple.      Right lower leg: No edema.      Left lower leg: No edema.   Neurological:      Mental Status: She is alert.      Motor: Weakness present.   Psychiatric:         Mood and Affect: Mood normal.         Behavior: Behavior is cooperative.         Assessment/Plan  Problem List Items Addressed This Visit       Diabetes mellitus (Multi)     Monitor fasting blood sugar  Glucoscans         GERD (gastroesophageal reflux disease)     Controlled  Monitor GI symptoms         Hypertension     Monitor blood pressure  Metoprolol  Hydrochlorothiazide  BP at goal         Weakness - Primary      Continue therapy          Depression     Monitor mood and behaviors  Quetiapine          Dementia (Multi)     ST for cognition  Monitor safety  Memory poor   Extensive Cognitive loss          Medications, treatments, and labs reviewed  Continue medications and treatments as listed in EMR    Scribe Attestation  Chiquita JAY Scribe   attest that this documentation has been prepared under the direction and in the presence of Christian Mazariegos MD.     Provider Attestation - Scribe documentation  All medical record entries made by the Scribe were at my direction and personally dictated by me. I have reviewed the chart and agree that the record accurately reflects my personal performance of the history, physical exam, discussion and plan.   Christian Mazariegos MD      Electronically Signed By: Christian Mazariegos MD   6/3/24  6:30 PM

## 2024-06-04 ENCOUNTER — NURSING HOME VISIT (OUTPATIENT)
Dept: POST ACUTE CARE | Facility: EXTERNAL LOCATION | Age: 84
End: 2024-06-04
Payer: COMMERCIAL

## 2024-06-04 DIAGNOSIS — F03.90 DEMENTIA, UNSPECIFIED DEMENTIA SEVERITY, UNSPECIFIED DEMENTIA TYPE, UNSPECIFIED WHETHER BEHAVIORAL, PSYCHOTIC, OR MOOD DISTURBANCE OR ANXIETY (MULTI): ICD-10-CM

## 2024-06-04 DIAGNOSIS — E10.9 TYPE 1 DIABETES MELLITUS WITHOUT COMPLICATION (MULTI): ICD-10-CM

## 2024-06-04 DIAGNOSIS — E87.1 HYPONATREMIA: ICD-10-CM

## 2024-06-04 DIAGNOSIS — E46 MALNUTRITION, UNSPECIFIED TYPE (MULTI): Primary | ICD-10-CM

## 2024-06-04 DIAGNOSIS — I10 HYPERTENSION, UNSPECIFIED TYPE: ICD-10-CM

## 2024-06-04 DIAGNOSIS — M19.90 ARTHRITIS: ICD-10-CM

## 2024-06-04 DIAGNOSIS — K21.9 GASTROESOPHAGEAL REFLUX DISEASE, UNSPECIFIED WHETHER ESOPHAGITIS PRESENT: ICD-10-CM

## 2024-06-04 DIAGNOSIS — E78.5 HYPERLIPIDEMIA, UNSPECIFIED HYPERLIPIDEMIA TYPE: ICD-10-CM

## 2024-06-04 DIAGNOSIS — F32.A DEPRESSION, UNSPECIFIED DEPRESSION TYPE: ICD-10-CM

## 2024-06-04 PROCEDURE — 99309 SBSQ NF CARE MODERATE MDM 30: CPT | Performed by: NURSE PRACTITIONER

## 2024-06-04 NOTE — LETTER
Patient: Christina Davila  : 1940    Encounter Date: 2024    PROGRESS NOTE    Subjective  Chief complaint: Christina Davila is a 83 y.o. female who is an acute skilled patient being seen and evaluated for weakness    HPI: .  Remains in therapy.  She is in bed.  Pleasant talkative and remains confused.  Nursing reports that her appetite is inconsistent.  She appears comfortable.  Denies any chest pain or tightness  HPI      Objective  Vital signs: 118/65-78-18-97.4    Physical Exam  Vitals and nursing note reviewed.   Constitutional:       General: She is not in acute distress.  Eyes:      Extraocular Movements: Extraocular movements intact.   Cardiovascular:      Rate and Rhythm: Normal rate and regular rhythm.   Pulmonary:      Effort: Pulmonary effort is normal.      Breath sounds: Normal breath sounds.      Comments: Lungs clear   Abdominal:      General: Bowel sounds are normal.      Palpations: Abdomen is soft.   Musculoskeletal:      Cervical back: Neck supple.      Right lower leg: No edema.      Left lower leg: No edema.   Neurological:      Mental Status: She is alert.   Psychiatric:         Mood and Affect: Mood normal.         Behavior: Behavior is cooperative.         Cognition and Memory: Cognition is impaired. Memory is impaired.         Assessment/Plan  Problem List Items Addressed This Visit       Diabetes mellitus (Multi)     Monitor fasting blood sugar  Glucoscans         GERD (gastroesophageal reflux disease)     Controlled  Monitor GI symptoms         Hyperlipidemia     Statin  Monitor lipids and LFTs         Hypertension     Monitor blood pressure  Metoprolol  Hydrochlorothiazide  BP at goal         Depression     Monitor mood and behaviors  Quetiapine          Arthritis     Has extensive arthritic changes in upper arms and hands. Has limited mobility - hands contracture    No inflammation   Tylenol prn          Malnutrition (Multi) - Primary     Underweight   Reports inconsistent   appetite   Monitor    Encourage supplements          Hyponatremia     Sodium tablets  Monitor BMP         Dementia (Multi)     ST for cognition  Monitor safety  Memory poor   Extensive Cognitive loss          Medications, treatments, and labs reviewed  Continue medications and treatments as listed in EMR    MARY Munoz      Electronically Signed By: MARY Munoz   6/8/24 12:42 PM

## 2024-06-04 NOTE — PROGRESS NOTES
PROGRESS NOTE    Subjective   Chief complaint: Christina Davila is a 83 y.o. female who is an acute skilled patient being seen and evaluated for weakness    HPI: .  Remains in therapy.  She is in bed.  Pleasant talkative and remains confused.  Nursing reports that her appetite is inconsistent.  She appears comfortable.  Denies any chest pain or tightness  HPI      Objective   Vital signs: 118/65-78-18-97.4    Physical Exam  Vitals and nursing note reviewed.   Constitutional:       General: She is not in acute distress.  Eyes:      Extraocular Movements: Extraocular movements intact.   Cardiovascular:      Rate and Rhythm: Normal rate and regular rhythm.   Pulmonary:      Effort: Pulmonary effort is normal.      Breath sounds: Normal breath sounds.      Comments: Lungs clear   Abdominal:      General: Bowel sounds are normal.      Palpations: Abdomen is soft.   Musculoskeletal:      Cervical back: Neck supple.      Right lower leg: No edema.      Left lower leg: No edema.   Neurological:      Mental Status: She is alert.   Psychiatric:         Mood and Affect: Mood normal.         Behavior: Behavior is cooperative.         Cognition and Memory: Cognition is impaired. Memory is impaired.         Assessment/Plan   Problem List Items Addressed This Visit       Diabetes mellitus (Multi)     Monitor fasting blood sugar  Glucoscans         GERD (gastroesophageal reflux disease)     Controlled  Monitor GI symptoms         Hyperlipidemia     Statin  Monitor lipids and LFTs         Hypertension     Monitor blood pressure  Metoprolol  Hydrochlorothiazide  BP at goal         Depression     Monitor mood and behaviors  Quetiapine          Arthritis     Has extensive arthritic changes in upper arms and hands. Has limited mobility - hands contracture    No inflammation   Tylenol prn          Malnutrition (Multi) - Primary     Underweight   Reports inconsistent  appetite   Monitor    Encourage supplements          Hyponatremia     Sodium  tablets  Monitor BMP         Dementia (Multi)     ST for cognition  Monitor safety  Memory poor   Extensive Cognitive loss          Medications, treatments, and labs reviewed  Continue medications and treatments as listed in EMR    Stella Duncan, APRN-CNP

## 2024-06-05 ENCOUNTER — NURSING HOME VISIT (OUTPATIENT)
Dept: POST ACUTE CARE | Facility: EXTERNAL LOCATION | Age: 84
End: 2024-06-05
Payer: COMMERCIAL

## 2024-06-05 DIAGNOSIS — E10.9 TYPE 1 DIABETES MELLITUS WITHOUT COMPLICATION (MULTI): ICD-10-CM

## 2024-06-05 DIAGNOSIS — R53.1 WEAKNESS: Primary | ICD-10-CM

## 2024-06-05 DIAGNOSIS — F03.90 DEMENTIA, UNSPECIFIED DEMENTIA SEVERITY, UNSPECIFIED DEMENTIA TYPE, UNSPECIFIED WHETHER BEHAVIORAL, PSYCHOTIC, OR MOOD DISTURBANCE OR ANXIETY (MULTI): ICD-10-CM

## 2024-06-05 DIAGNOSIS — I10 HYPERTENSION, UNSPECIFIED TYPE: ICD-10-CM

## 2024-06-05 PROCEDURE — 99308 SBSQ NF CARE LOW MDM 20: CPT | Performed by: INTERNAL MEDICINE

## 2024-06-05 NOTE — LETTER
Patient: Christina Davila  : 1940    Encounter Date: 2024    PROGRESS NOTE    Subjective  Chief complaint: Christina Davila is a 83 y.o. female who is an acute skilled patient being seen and evaluated for weakness    HPI:  HPI  Patient is working in therapy at this time.  Patient is working on neuromuscular education and standing position with mod assist x 2 with upper extremity support x 2 wheeled walker to improve functional transfers and mobility.  Speech therapy working with patient to address cognition.  Patient seen and examined at the bedside, appears to be in no acute distress.  Nursing staff voicing no new concerns.  Patient denying constitutional symptoms at this time.    Objective  Vital signs: 119/74, 98.2, 78, 17, 95%, blood sugar 119    Physical Exam  Constitutional:       General: She is not in acute distress.  Eyes:      Extraocular Movements: Extraocular movements intact.   Cardiovascular:      Rate and Rhythm: Normal rate and regular rhythm.   Pulmonary:      Effort: Pulmonary effort is normal.      Breath sounds: Normal breath sounds.   Abdominal:      General: Bowel sounds are normal.      Palpations: Abdomen is soft.   Musculoskeletal:      Cervical back: Neck supple.      Right lower leg: No edema.      Left lower leg: No edema.   Neurological:      Mental Status: She is alert.      Motor: Weakness present.   Psychiatric:         Mood and Affect: Mood normal.         Behavior: Behavior is cooperative.         Assessment/Plan  Problem List Items Addressed This Visit       Diabetes mellitus (Multi)     Monitor fasting blood sugar  Glucoscans         Hypertension     Monitor blood pressure  Metoprolol  Hydrochlorothiazide  BP at goal         Weakness - Primary     Continue working towards goals in therapy         Dementia (Multi)     ST for cognition  Monitor safety  Memory poor   Extensive Cognitive loss          Medications, treatments, and labs reviewed  Continue medications and treatments  as listed in EMR      Scribe Attestation  I, Shantel Chen   attest that this documentation has been prepared under the direction and in the presence of Christian Mazariegos MD    Provider Attestation - Scribe documentation  All medical record entries made by the Scribe were at my direction and personally dictated by me. I have reviewed the chart and agree that the record accurately reflects my personal performance of the history, physical exam, discussion and plan.   Christian Mazariegos MD        Electronically Signed By: Christian Mazariegos MD   6/5/24  3:16 PM

## 2024-06-05 NOTE — PROGRESS NOTES
PROGRESS NOTE    Subjective   Chief complaint: Christina Davila is a 83 y.o. female who is an acute skilled patient being seen and evaluated for weakness    HPI:  HPI  Patient is working in therapy at this time.  Patient is working on neuromuscular education and standing position with mod assist x 2 with upper extremity support x 2 wheeled walker to improve functional transfers and mobility.  Speech therapy working with patient to address cognition.  Patient seen and examined at the bedside, appears to be in no acute distress.  Nursing staff voicing no new concerns.  Patient denying constitutional symptoms at this time.    Objective   Vital signs: 119/74, 98.2, 78, 17, 95%, blood sugar 119    Physical Exam  Constitutional:       General: She is not in acute distress.  Eyes:      Extraocular Movements: Extraocular movements intact.   Cardiovascular:      Rate and Rhythm: Normal rate and regular rhythm.   Pulmonary:      Effort: Pulmonary effort is normal.      Breath sounds: Normal breath sounds.   Abdominal:      General: Bowel sounds are normal.      Palpations: Abdomen is soft.   Musculoskeletal:      Cervical back: Neck supple.      Right lower leg: No edema.      Left lower leg: No edema.   Neurological:      Mental Status: She is alert.      Motor: Weakness present.   Psychiatric:         Mood and Affect: Mood normal.         Behavior: Behavior is cooperative.         Assessment/Plan   Problem List Items Addressed This Visit       Diabetes mellitus (Multi)     Monitor fasting blood sugar  Glucoscans         Hypertension     Monitor blood pressure  Metoprolol  Hydrochlorothiazide  BP at goal         Weakness - Primary     Continue working towards goals in therapy         Dementia (Multi)     ST for cognition  Monitor safety  Memory poor   Extensive Cognitive loss          Medications, treatments, and labs reviewed  Continue medications and treatments as listed in EMR      Scribe Attestation  I, Charmaine Diaz,  Scribe   attest that this documentation has been prepared under the direction and in the presence of Christian Mazariegos MD    Provider Attestation - Scribe documentation  All medical record entries made by the Scribe were at my direction and personally dictated by me. I have reviewed the chart and agree that the record accurately reflects my personal performance of the history, physical exam, discussion and plan.   Christian Mazariegos MD

## 2024-06-06 ENCOUNTER — NURSING HOME VISIT (OUTPATIENT)
Dept: POST ACUTE CARE | Facility: EXTERNAL LOCATION | Age: 84
End: 2024-06-06
Payer: COMMERCIAL

## 2024-06-06 DIAGNOSIS — F03.90 DEMENTIA, UNSPECIFIED DEMENTIA SEVERITY, UNSPECIFIED DEMENTIA TYPE, UNSPECIFIED WHETHER BEHAVIORAL, PSYCHOTIC, OR MOOD DISTURBANCE OR ANXIETY (MULTI): Primary | ICD-10-CM

## 2024-06-06 DIAGNOSIS — K21.9 GASTROESOPHAGEAL REFLUX DISEASE, UNSPECIFIED WHETHER ESOPHAGITIS PRESENT: ICD-10-CM

## 2024-06-06 DIAGNOSIS — F32.A DEPRESSION, UNSPECIFIED DEPRESSION TYPE: ICD-10-CM

## 2024-06-06 DIAGNOSIS — R53.1 WEAKNESS: ICD-10-CM

## 2024-06-06 DIAGNOSIS — E10.9 TYPE 1 DIABETES MELLITUS WITHOUT COMPLICATION (MULTI): ICD-10-CM

## 2024-06-06 DIAGNOSIS — E46 MALNUTRITION, UNSPECIFIED TYPE (MULTI): ICD-10-CM

## 2024-06-06 DIAGNOSIS — E78.5 HYPERLIPIDEMIA, UNSPECIFIED HYPERLIPIDEMIA TYPE: ICD-10-CM

## 2024-06-06 DIAGNOSIS — I10 HYPERTENSION, UNSPECIFIED TYPE: ICD-10-CM

## 2024-06-06 PROCEDURE — 99309 SBSQ NF CARE MODERATE MDM 30: CPT | Performed by: NURSE PRACTITIONER

## 2024-06-06 NOTE — PROGRESS NOTES
PROGRESS NOTE    Subjective   Chief complaint: Christina Davila is a 83 y.o. female who is an acute skilled patient being seen and evaluated for weakness    HPI:   Remains in therapy.  Is currently in bed, appears comfortable.  Pleasantly confused.   Nursing reports that her appetite is inconsistent and she likes to stay in bed.   HPI      Objective   Vital signs: 122/64-72-17-98.3     Physical Exam  Vitals and nursing note reviewed.   Constitutional:       General: She is awake. She is not in acute distress.     Appearance: She is underweight.   Eyes:      Extraocular Movements: Extraocular movements intact.   Cardiovascular:      Rate and Rhythm: Normal rate and regular rhythm.   Pulmonary:      Effort: Pulmonary effort is normal.      Breath sounds: Normal breath sounds.      Comments: Lungs clear   Abdominal:      General: Bowel sounds are normal.      Palpations: Abdomen is soft.   Musculoskeletal:      Cervical back: Neck supple.      Right lower leg: No edema.      Left lower leg: No edema.   Psychiatric:         Mood and Affect: Mood normal.         Behavior: Behavior is cooperative.         Cognition and Memory: Cognition is impaired. Memory is impaired.         Assessment/Plan   Problem List Items Addressed This Visit       Diabetes mellitus (Multi)     Monitor fasting blood sugar  Glucoscans         GERD (gastroesophageal reflux disease)     Controlled  Monitor GI symptoms         Hyperlipidemia     Statin  Monitor lipids and LFTs         Hypertension     Monitor blood pressure  Metoprolol  Hydrochlorothiazide  BP at goal         Weakness     Continue working towards goals in therapy  Therapy reports that she has poor endurance   Is frequently refusing therapy sessions          Depression     Monitor mood and behaviors  Quetiapine          Malnutrition (Multi)     Underweight   Reports inconsistent  appetite   Monitor    Encourage supplements          Dementia (Multi) - Primary     ST for cognition  Monitor  safety  Memory poor   Extensive Cognitive loss          Medications, treatments, and labs reviewed  Continue medications and treatments as listed in EMR    Stella Duncan, APRN-CNP

## 2024-06-06 NOTE — LETTER
Patient: Christina Davila  : 1940    Encounter Date: 2024    PROGRESS NOTE    Subjective  Chief complaint: Christina Davila is a 83 y.o. female who is an acute skilled patient being seen and evaluated for weakness    HPI:   Remains in therapy.  Is currently in bed, appears comfortable.  Pleasantly confused.   Nursing reports that her appetite is inconsistent and she likes to stay in bed.   HPI      Objective  Vital signs: 122/64-72-17-98.3     Physical Exam  Vitals and nursing note reviewed.   Constitutional:       General: She is awake. She is not in acute distress.     Appearance: She is underweight.   Eyes:      Extraocular Movements: Extraocular movements intact.   Cardiovascular:      Rate and Rhythm: Normal rate and regular rhythm.   Pulmonary:      Effort: Pulmonary effort is normal.      Breath sounds: Normal breath sounds.      Comments: Lungs clear   Abdominal:      General: Bowel sounds are normal.      Palpations: Abdomen is soft.   Musculoskeletal:      Cervical back: Neck supple.      Right lower leg: No edema.      Left lower leg: No edema.   Psychiatric:         Mood and Affect: Mood normal.         Behavior: Behavior is cooperative.         Cognition and Memory: Cognition is impaired. Memory is impaired.         Assessment/Plan  Problem List Items Addressed This Visit       Diabetes mellitus (Multi)     Monitor fasting blood sugar  Glucoscans         GERD (gastroesophageal reflux disease)     Controlled  Monitor GI symptoms         Hyperlipidemia     Statin  Monitor lipids and LFTs         Hypertension     Monitor blood pressure  Metoprolol  Hydrochlorothiazide  BP at goal         Weakness     Continue working towards goals in therapy  Therapy reports that she has poor endurance   Is frequently refusing therapy sessions          Depression     Monitor mood and behaviors  Quetiapine          Malnutrition (Multi)     Underweight   Reports inconsistent  appetite   Monitor    Encourage supplements           Dementia (Multi) - Primary     ST for cognition  Monitor safety  Memory poor   Extensive Cognitive loss          Medications, treatments, and labs reviewed  Continue medications and treatments as listed in EMR    MARY Munoz      Electronically Signed By: MARY Munoz   6/8/24  5:27 PM

## 2024-06-08 NOTE — ASSESSMENT & PLAN NOTE
Continue working towards goals in therapy  Therapy reports that she has poor endurance   Is frequently refusing therapy sessions

## 2024-06-10 ENCOUNTER — NURSING HOME VISIT (OUTPATIENT)
Dept: POST ACUTE CARE | Facility: EXTERNAL LOCATION | Age: 84
End: 2024-06-10
Payer: COMMERCIAL

## 2024-06-10 DIAGNOSIS — I10 HYPERTENSION, UNSPECIFIED TYPE: ICD-10-CM

## 2024-06-10 DIAGNOSIS — F32.A DEPRESSION, UNSPECIFIED DEPRESSION TYPE: ICD-10-CM

## 2024-06-10 DIAGNOSIS — R53.1 WEAKNESS: ICD-10-CM

## 2024-06-10 DIAGNOSIS — K21.9 GASTROESOPHAGEAL REFLUX DISEASE, UNSPECIFIED WHETHER ESOPHAGITIS PRESENT: ICD-10-CM

## 2024-06-10 DIAGNOSIS — E10.9 TYPE 1 DIABETES MELLITUS WITHOUT COMPLICATION (MULTI): ICD-10-CM

## 2024-06-10 DIAGNOSIS — F03.90 DEMENTIA, UNSPECIFIED DEMENTIA SEVERITY, UNSPECIFIED DEMENTIA TYPE, UNSPECIFIED WHETHER BEHAVIORAL, PSYCHOTIC, OR MOOD DISTURBANCE OR ANXIETY (MULTI): ICD-10-CM

## 2024-06-10 PROCEDURE — 99308 SBSQ NF CARE LOW MDM 20: CPT | Performed by: INTERNAL MEDICINE

## 2024-06-10 NOTE — PROGRESS NOTES
PROGRESS NOTE    Subjective   Chief complaint: Christina Davila is a 83 y.o. female who is an acute skilled patient being seen and evaluated for weakness    HPI:  Therapy has been working with the patient to improve strength and endurance with ADLs, transfers, and mobility.  Patient continues to work toward goals. Pt ambulated with 2WW with modAx 2 (with assist from outside personnel) with close WC follow for distances of 6ft and 4ft. Min tactile cueing for using contralateral weight shifting and max VC for advancing LE's fwd with PTA noting shuffling steps with maximal pronation B/L. Patient is stable and has no new complaints. Pt performed STS x3 from WC/EOB to 2WW with modA x2 on trial 1 and maxA x2 on trial 2-3. ST working on establishment of compensatory techniques to increase skills, Techniques for cueing hierarchy to increase functional goals, analysis of patient's response to skilled techniques and analysis of patient's ability to transfer learned skills training in use of concrete, one step directions by speaker to increase comprehension, training in use of one-word nouns or actions by speaker to facilitate patient follow through and training in use of short, direct comments to facilitate follow-through. Nursing staff voices no new concerns today.      Objective   Vital signs: 118/54,72,95%,     Physical Exam  Constitutional:       General: She is not in acute distress.  Eyes:      Extraocular Movements: Extraocular movements intact.   Cardiovascular:      Rate and Rhythm: Normal rate and regular rhythm.   Pulmonary:      Effort: Pulmonary effort is normal.      Breath sounds: Normal breath sounds.   Abdominal:      General: Bowel sounds are normal.      Palpations: Abdomen is soft.   Musculoskeletal:      Cervical back: Neck supple.      Right lower leg: No edema.      Left lower leg: No edema.   Neurological:      Mental Status: She is alert.      Motor: Weakness present.   Psychiatric:         Mood and  Affect: Mood normal.         Behavior: Behavior is cooperative.         Assessment/Plan   Problem List Items Addressed This Visit       Diabetes mellitus (Multi)     Monitor fasting blood sugar  Glucoscans         GERD (gastroesophageal reflux disease)     Controlled  Monitor GI symptoms         Hypertension     Monitor blood pressure  Metoprolol  Hydrochlorothiazide  BP at goal         Weakness     Continue working towards goals in therapy  Therapy reports that she has poor endurance           Depression     Monitor mood and behaviors  Quetiapine          Dementia (Multi)     ST for cognition  Monitor safety  Memory poor   Extensive Cognitive loss          Medications, treatments, and labs reviewed  Continue medications and treatments as listed in EMR    Scribe Attestation  Chiquita JAY Scribe   attest that this documentation has been prepared under the direction and in the presence of Christian Mazariegos MD.     Provider Attestation - Scribe documentation  All medical record entries made by the Scribe were at my direction and personally dictated by me. I have reviewed the chart and agree that the record accurately reflects my personal performance of the history, physical exam, discussion and plan.   Christian Mazariegos MD

## 2024-06-10 NOTE — LETTER
Patient: Christina Davila  : 1940    Encounter Date: 06/10/2024    PROGRESS NOTE    Subjective  Chief complaint: Christina Davila is a 83 y.o. female who is an acute skilled patient being seen and evaluated for weakness    HPI:  Therapy has been working with the patient to improve strength and endurance with ADLs, transfers, and mobility.  Patient continues to work toward goals. Pt ambulated with 2WW with modAx 2 (with assist from outside personnel) with close WC follow for distances of 6ft and 4ft. Min tactile cueing for using contralateral weight shifting and max VC for advancing LE's fwd with PTA noting shuffling steps with maximal pronation B/L. Patient is stable and has no new complaints. Pt performed STS x3 from WC/EOB to 2WW with modA x2 on trial 1 and maxA x2 on trial 2-3. ST working on establishment of compensatory techniques to increase skills, Techniques for cueing hierarchy to increase functional goals, analysis of patient's response to skilled techniques and analysis of patient's ability to transfer learned skills training in use of concrete, one step directions by speaker to increase comprehension, training in use of one-word nouns or actions by speaker to facilitate patient follow through and training in use of short, direct comments to facilitate follow-through. Nursing staff voices no new concerns today.      Objective  Vital signs: 118/54,72,95%,     Physical Exam  Constitutional:       General: She is not in acute distress.  Eyes:      Extraocular Movements: Extraocular movements intact.   Cardiovascular:      Rate and Rhythm: Normal rate and regular rhythm.   Pulmonary:      Effort: Pulmonary effort is normal.      Breath sounds: Normal breath sounds.   Abdominal:      General: Bowel sounds are normal.      Palpations: Abdomen is soft.   Musculoskeletal:      Cervical back: Neck supple.      Right lower leg: No edema.      Left lower leg: No edema.   Neurological:      Mental Status: She is  alert.      Motor: Weakness present.   Psychiatric:         Mood and Affect: Mood normal.         Behavior: Behavior is cooperative.         Assessment/Plan  Problem List Items Addressed This Visit       Diabetes mellitus (Multi)     Monitor fasting blood sugar  Glucoscans         GERD (gastroesophageal reflux disease)     Controlled  Monitor GI symptoms         Hypertension     Monitor blood pressure  Metoprolol  Hydrochlorothiazide  BP at goal         Weakness     Continue working towards goals in therapy  Therapy reports that she has poor endurance           Depression     Monitor mood and behaviors  Quetiapine          Dementia (Multi)     ST for cognition  Monitor safety  Memory poor   Extensive Cognitive loss          Medications, treatments, and labs reviewed  Continue medications and treatments as listed in EMR    Scribe Attestation  Chiquita JAY Scribe   attest that this documentation has been prepared under the direction and in the presence of Christian Mazariegos MD.     Provider Attestation - Scribe documentation  All medical record entries made by the Scribe were at my direction and personally dictated by me. I have reviewed the chart and agree that the record accurately reflects my personal performance of the history, physical exam, discussion and plan.   Christian Mazariegos MD      Electronically Signed By: Christian Mazariegos MD   6/10/24  4:38 PM

## 2024-06-25 ENCOUNTER — NURSING HOME VISIT (OUTPATIENT)
Dept: POST ACUTE CARE | Facility: EXTERNAL LOCATION | Age: 84
End: 2024-06-25
Payer: COMMERCIAL

## 2024-06-25 DIAGNOSIS — I10 HYPERTENSION, UNSPECIFIED TYPE: ICD-10-CM

## 2024-06-25 DIAGNOSIS — E46 MALNUTRITION, UNSPECIFIED TYPE (MULTI): ICD-10-CM

## 2024-06-25 DIAGNOSIS — E10.9 TYPE 1 DIABETES MELLITUS WITHOUT COMPLICATION (MULTI): ICD-10-CM

## 2024-06-25 DIAGNOSIS — E78.5 HYPERLIPIDEMIA, UNSPECIFIED HYPERLIPIDEMIA TYPE: ICD-10-CM

## 2024-06-25 DIAGNOSIS — R53.1 WEAKNESS: Primary | ICD-10-CM

## 2024-06-25 DIAGNOSIS — F03.90 DEMENTIA, UNSPECIFIED DEMENTIA SEVERITY, UNSPECIFIED DEMENTIA TYPE, UNSPECIFIED WHETHER BEHAVIORAL, PSYCHOTIC, OR MOOD DISTURBANCE OR ANXIETY (MULTI): ICD-10-CM

## 2024-06-25 DIAGNOSIS — J18.9 PNEUMONIA DUE TO INFECTIOUS ORGANISM, UNSPECIFIED LATERALITY, UNSPECIFIED PART OF LUNG: ICD-10-CM

## 2024-06-25 PROCEDURE — 99309 SBSQ NF CARE MODERATE MDM 30: CPT | Performed by: NURSE PRACTITIONER

## 2024-06-25 NOTE — LETTER
Patient: Christina Davila  : 1940    Encounter Date: 2024    PROGRESS NOTE    Subjective  Chief complaint: Christina Davila is a 83 y.o. female who is an acute skilled patient being seen and evaluated for weakness    HPI: Admitted to this facility for rehabilitation following hospitalization and treatment for acute pneumonia. .   She is pleasant and talkative. Responding actively. Poor historian. Oriented to name.   Denies any chest pain. Has occasional dry cough.   Requires extensive assistance from staff for all HOC and mobility.     HPI      Objective  Vital signs: 129/67-82-17-98.3     Physical Exam  Vitals and nursing note reviewed.   Constitutional:       General: She is awake. She is not in acute distress.     Appearance: She is underweight.      Comments: Actively responding talkative    Eyes:      Extraocular Movements: Extraocular movements intact.   Cardiovascular:      Rate and Rhythm: Normal rate and regular rhythm.   Pulmonary:      Effort: Pulmonary effort is normal.      Breath sounds: Normal breath sounds.      Comments: Lungs clear   Has occassional dry cough   Abdominal:      General: Bowel sounds are normal.      Palpations: Abdomen is soft.   Musculoskeletal:      Cervical back: Neck supple.      Right lower leg: No edema.      Left lower leg: No edema.      Comments: Hands are contracted, arthritic changes   Limited movement in all extremities    Psychiatric:         Mood and Affect: Mood normal.         Behavior: Behavior is cooperative.         Cognition and Memory: Cognition is impaired. Memory is impaired.         Assessment/Plan  Problem List Items Addressed This Visit       Diabetes mellitus (Multi)     Monitor fasting blood sugar  Glucoscans         Hyperlipidemia     Statin  Monitor lipids and LFTs         Hypertension     Monitor blood pressure  Metoprolol         Weakness - Primary     Requires rehabilitation   Monitor progress          Malnutrition (Multi)     Underweight    Reports inconsistent  appetite   Monitor    Encourage supplements          Dementia (Multi)     Monitor for safety  Assist with ADLs.  Donepezil  Depakote         Pneumonia     Completed antibiotics in hospital.  Aerosol treatment.  Mucinex          Medications, treatments, and labs reviewed  Continue medications and treatments as listed in EMR    MARY Munoz      Electronically Signed By: MARY Munoz   6/29/24  9:58 AM

## 2024-06-25 NOTE — PROGRESS NOTES
PROGRESS NOTE    Subjective   Chief complaint: Christina Davila is a 83 y.o. female who is an acute skilled patient being seen and evaluated for weakness    HPI: Admitted to this facility for rehabilitation following hospitalization and treatment for acute pneumonia. .   She is pleasant and talkative. Responding actively. Poor historian. Oriented to name.   Denies any chest pain. Has occasional dry cough.   Requires extensive assistance from staff for all HOC and mobility.     HPI      Objective   Vital signs: 129/67-82-17-98.3     Physical Exam  Vitals and nursing note reviewed.   Constitutional:       General: She is awake. She is not in acute distress.     Appearance: She is underweight.      Comments: Actively responding talkative    Eyes:      Extraocular Movements: Extraocular movements intact.   Cardiovascular:      Rate and Rhythm: Normal rate and regular rhythm.   Pulmonary:      Effort: Pulmonary effort is normal.      Breath sounds: Normal breath sounds.      Comments: Lungs clear   Has occassional dry cough   Abdominal:      General: Bowel sounds are normal.      Palpations: Abdomen is soft.   Musculoskeletal:      Cervical back: Neck supple.      Right lower leg: No edema.      Left lower leg: No edema.      Comments: Hands are contracted, arthritic changes   Limited movement in all extremities    Psychiatric:         Mood and Affect: Mood normal.         Behavior: Behavior is cooperative.         Cognition and Memory: Cognition is impaired. Memory is impaired.         Assessment/Plan   Problem List Items Addressed This Visit       Diabetes mellitus (Multi)     Monitor fasting blood sugar  Glucoscans         Hyperlipidemia     Statin  Monitor lipids and LFTs         Hypertension     Monitor blood pressure  Metoprolol         Weakness - Primary     Requires rehabilitation   Monitor progress          Malnutrition (Multi)     Underweight   Reports inconsistent  appetite   Monitor    Encourage supplements           Dementia (Multi)     Monitor for safety  Assist with ADLs.  Donepezil  Depakote         Pneumonia     Completed antibiotics in hospital.  Aerosol treatment.  Mucinex          Medications, treatments, and labs reviewed  Continue medications and treatments as listed in EMR    Stella Duncan, APRN-CNP

## 2024-06-26 ENCOUNTER — NURSING HOME VISIT (OUTPATIENT)
Dept: POST ACUTE CARE | Facility: EXTERNAL LOCATION | Age: 84
End: 2024-06-26
Payer: COMMERCIAL

## 2024-06-26 DIAGNOSIS — R33.9 URINARY RETENTION: ICD-10-CM

## 2024-06-26 DIAGNOSIS — F32.A DEPRESSION, UNSPECIFIED DEPRESSION TYPE: ICD-10-CM

## 2024-06-26 DIAGNOSIS — J18.9 PNEUMONIA DUE TO INFECTIOUS ORGANISM, UNSPECIFIED LATERALITY, UNSPECIFIED PART OF LUNG: Primary | ICD-10-CM

## 2024-06-26 DIAGNOSIS — I10 HYPERTENSION, UNSPECIFIED TYPE: ICD-10-CM

## 2024-06-26 DIAGNOSIS — E78.5 HYPERLIPIDEMIA, UNSPECIFIED HYPERLIPIDEMIA TYPE: ICD-10-CM

## 2024-06-26 DIAGNOSIS — F03.90 DEMENTIA, UNSPECIFIED DEMENTIA SEVERITY, UNSPECIFIED DEMENTIA TYPE, UNSPECIFIED WHETHER BEHAVIORAL, PSYCHOTIC, OR MOOD DISTURBANCE OR ANXIETY (MULTI): ICD-10-CM

## 2024-06-26 DIAGNOSIS — E10.9 TYPE 1 DIABETES MELLITUS WITHOUT COMPLICATION (MULTI): ICD-10-CM

## 2024-06-26 DIAGNOSIS — K21.9 GASTROESOPHAGEAL REFLUX DISEASE, UNSPECIFIED WHETHER ESOPHAGITIS PRESENT: ICD-10-CM

## 2024-06-26 PROCEDURE — 99305 1ST NF CARE MODERATE MDM 35: CPT | Performed by: INTERNAL MEDICINE

## 2024-06-26 NOTE — ASSESSMENT & PLAN NOTE
Rodriguez   Zygomaticofacial Flap Text: Given the location of the defect, shape of the defect and the proximity to free margins a zygomaticofacial flap was deemed most appropriate for repair.  Using a sterile surgical marker, the appropriate flap was drawn incorporating the defect and placing the expected incisions within the relaxed skin tension lines where possible. The area thus outlined was incised deep to adipose tissue with a #15 scalpel blade with preservation of a vascular pedicle.  The skin margins were undermined to an appropriate distance in all directions utilizing iris scissors.  The flap was then placed into the defect and anchored with interrupted buried subcutaneous sutures.

## 2024-06-26 NOTE — LETTER
Patient: Christina Davila  : 1940    Encounter Date: 2024    HISTORY & PHYSICAL    Subjective  Chief complaint: Christina Davila is a 83 y.o. female who is being seen and evaluated for multiple medical problems.  Patient presents for readmission to nursing facility.    HPI:  HPI  Patient was admitted to the hospital and treated for pneumonia.  PT OT did evaluate patient with recommendations to return to SNF for further medical management and therapy.  Patient did complete antibiotic course in hospital.  Patient was tolerating diet, and hemodynamically stable to discharge back to nursing facility.  Patient was seen and examined at the bedside, appears to be in no acute distress.  Patient denies chest pain, shortness of breath, nausea or vomiting.    Past Medical History:   Diagnosis Date   • Arthritis 2024   • Cellulitis    • Cognitive communication deficit    • Dementia (Multi)    • Diabetes mellitus (Multi)    • GERD (gastroesophageal reflux disease)    • Hyperlipidemia    • Hypertension    • Pneumonia    • Urinary retention    • Urinary tract infection        No past surgical history on file.    Family History   Problem Relation Name Age of Onset   • No Known Problems Mother     • No Known Problems Father         Social History     Socioeconomic History   • Marital status: Single     Spouse name: Not on file   • Number of children: Not on file   • Years of education: Not on file   • Highest education level: Not on file   Occupational History   • Not on file   Tobacco Use   • Smoking status: Not on file   • Smokeless tobacco: Not on file   Substance and Sexual Activity   • Alcohol use: Not on file   • Drug use: Not on file   • Sexual activity: Not on file   Other Topics Concern   • Not on file   Social History Narrative   • Not on file     Social Determinants of Health     Financial Resource Strain: Not on file   Food Insecurity: Not on file   Transportation Needs: Not on file   Physical Activity: Not on  file   Stress: Not on file   Social Connections: Not on file   Intimate Partner Violence: Not on file   Housing Stability: Not on file       Vital signs: 116/67, 97.9, 68, 17, 96%, blood sugar 100    Objective  Physical Exam  Constitutional:       General: She is not in acute distress.  Eyes:      Extraocular Movements: Extraocular movements intact.   Cardiovascular:      Rate and Rhythm: Normal rate and regular rhythm.   Pulmonary:      Effort: Pulmonary effort is normal.      Breath sounds: Normal breath sounds.   Abdominal:      General: Bowel sounds are normal.      Palpations: Abdomen is soft.   Genitourinary:     Comments: Rodriguez  Musculoskeletal:      Cervical back: Neck supple.      Right lower leg: No edema.      Left lower leg: No edema.   Neurological:      Mental Status: She is alert.      Motor: Weakness present.   Psychiatric:         Mood and Affect: Mood normal.         Behavior: Behavior is cooperative.         Assessment/Plan  Problem List Items Addressed This Visit       Diabetes mellitus (Multi)     Monitor fasting blood sugar  Glucoscans         GERD (gastroesophageal reflux disease)     Monitor for GI symptoms         Hyperlipidemia     Statin  Monitor lipids and LFTs         Hypertension     Monitor blood pressure  Metoprolol         Depression     Monitor mood and behaviors         Dementia (Multi)     Monitor for safety  Assist with ADLs.  Donepezil  Depakote         Pneumonia - Primary     Completed antibiotics in hospital.  Aerosol treatment.  Mucinex         Urinary retention     [facility] records reviewed  Medications, treatments, and labs reviewed  Continue medications and treatments as listed in EMR  Discussed with nursing and therapy      Scribe Attestation  I, Shantel Chen   attest that this documentation has been prepared under the direction and in the presence of Christian Mazariegos MD    Provider Attestation - Scribe documentation  All medical record entries  made by the Scribe were at my direction and personally dictated by me. I have reviewed the chart and agree that the record accurately reflects my personal performance of the history, physical exam, discussion and plan.   Christian Mazariegos MD      Electronically Signed By: Christian Mazariegos MD   6/26/24  3:22 PM

## 2024-06-26 NOTE — PROGRESS NOTES
HISTORY & PHYSICAL    Subjective   Chief complaint: Christina Davila is a 83 y.o. female who is being seen and evaluated for multiple medical problems.  Patient presents for readmission to nursing facility.    HPI:  HPI  Patient was admitted to the hospital and treated for pneumonia.  PT OT did evaluate patient with recommendations to return to SNF for further medical management and therapy.  Patient did complete antibiotic course in hospital.  Patient was tolerating diet, and hemodynamically stable to discharge back to nursing facility.  Patient was seen and examined at the bedside, appears to be in no acute distress.  Patient denies chest pain, shortness of breath, nausea or vomiting.    Past Medical History:   Diagnosis Date    Arthritis 05/16/2024    Cellulitis     Cognitive communication deficit     Dementia (Multi)     Diabetes mellitus (Multi)     GERD (gastroesophageal reflux disease)     Hyperlipidemia     Hypertension     Pneumonia     Urinary retention     Urinary tract infection        No past surgical history on file.    Family History   Problem Relation Name Age of Onset    No Known Problems Mother      No Known Problems Father         Social History     Socioeconomic History    Marital status: Single     Spouse name: Not on file    Number of children: Not on file    Years of education: Not on file    Highest education level: Not on file   Occupational History    Not on file   Tobacco Use    Smoking status: Not on file    Smokeless tobacco: Not on file   Substance and Sexual Activity    Alcohol use: Not on file    Drug use: Not on file    Sexual activity: Not on file   Other Topics Concern    Not on file   Social History Narrative    Not on file     Social Determinants of Health     Financial Resource Strain: Not on file   Food Insecurity: Not on file   Transportation Needs: Not on file   Physical Activity: Not on file   Stress: Not on file   Social Connections: Not on file   Intimate Partner Violence: Not  on file   Housing Stability: Not on file       Vital signs: 116/67, 97.9, 68, 17, 96%, blood sugar 100    Objective   Physical Exam  Constitutional:       General: She is not in acute distress.  Eyes:      Extraocular Movements: Extraocular movements intact.   Cardiovascular:      Rate and Rhythm: Normal rate and regular rhythm.   Pulmonary:      Effort: Pulmonary effort is normal.      Breath sounds: Normal breath sounds.   Abdominal:      General: Bowel sounds are normal.      Palpations: Abdomen is soft.   Genitourinary:     Comments: Rodriguez  Musculoskeletal:      Cervical back: Neck supple.      Right lower leg: No edema.      Left lower leg: No edema.   Neurological:      Mental Status: She is alert.      Motor: Weakness present.   Psychiatric:         Mood and Affect: Mood normal.         Behavior: Behavior is cooperative.         Assessment/Plan   Problem List Items Addressed This Visit       Diabetes mellitus (Multi)     Monitor fasting blood sugar  Glucoscans         GERD (gastroesophageal reflux disease)     Monitor for GI symptoms         Hyperlipidemia     Statin  Monitor lipids and LFTs         Hypertension     Monitor blood pressure  Metoprolol         Depression     Monitor mood and behaviors         Dementia (Multi)     Monitor for safety  Assist with ADLs.  Donepezil  Depakote         Pneumonia - Primary     Completed antibiotics in hospital.  Aerosol treatment.  Mucinex         Urinary retention     [facility] records reviewed  Medications, treatments, and labs reviewed  Continue medications and treatments as listed in EMR  Discussed with nursing and therapy      Scribe Attestation  ICharmaine Scribe   attest that this documentation has been prepared under the direction and in the presence of Chritsian Mazariegos MD    Provider Attestation - Scribe documentation  All medical record entries made by the Scribe were at my direction and personally dictated by me. I have reviewed the  chart and agree that the record accurately reflects my personal performance of the history, physical exam, discussion and plan.   Christian Mazariegos MD

## 2024-06-28 ENCOUNTER — NURSING HOME VISIT (OUTPATIENT)
Dept: POST ACUTE CARE | Facility: EXTERNAL LOCATION | Age: 84
End: 2024-06-28
Payer: COMMERCIAL

## 2024-06-28 DIAGNOSIS — R53.1 WEAKNESS: Primary | ICD-10-CM

## 2024-06-28 DIAGNOSIS — F03.90 DEMENTIA, UNSPECIFIED DEMENTIA SEVERITY, UNSPECIFIED DEMENTIA TYPE, UNSPECIFIED WHETHER BEHAVIORAL, PSYCHOTIC, OR MOOD DISTURBANCE OR ANXIETY (MULTI): ICD-10-CM

## 2024-06-28 DIAGNOSIS — I10 HYPERTENSION, UNSPECIFIED TYPE: ICD-10-CM

## 2024-06-28 DIAGNOSIS — E10.9 TYPE 1 DIABETES MELLITUS WITHOUT COMPLICATION (MULTI): ICD-10-CM

## 2024-06-28 PROCEDURE — 99309 SBSQ NF CARE MODERATE MDM 30: CPT | Performed by: NURSE PRACTITIONER

## 2024-06-28 NOTE — LETTER
Patient: Christina Davila  : 1940    Encounter Date: 2024    PROGRESS NOTE    Subjective  Chief complaint: Christina Davila is a 83 y.o. female who is an acute skilled patient being seen and evaluated for weakness    HPI: she is actively responding. Pleasant and talkative.  Care giver reports that her appetite is good. Requires extensive assistance with all HOC and mobility.   Has nonproductive cough. No respiratory congestion or wheezing . Remains afebrile   Remains in therapy   HPI      Objective  Vital signs: 119/80-76-18-98.3     Physical Exam  Vitals and nursing note reviewed.   Constitutional:       General: She is awake. She is not in acute distress.     Appearance: She is underweight.      Comments: Actively responding talkative    Eyes:      Extraocular Movements: Extraocular movements intact.   Cardiovascular:      Rate and Rhythm: Normal rate and regular rhythm.   Pulmonary:      Effort: Pulmonary effort is normal.      Breath sounds: Normal breath sounds.      Comments: Lungs clear   Has occassional dry cough   Abdominal:      General: Bowel sounds are normal.      Palpations: Abdomen is soft.   Musculoskeletal:      Cervical back: Neck supple.      Right lower leg: No edema.      Left lower leg: No edema.      Comments: Hands are contracted, arthritic changes   Limited movement in all extremities    Psychiatric:         Mood and Affect: Mood normal.         Behavior: Behavior is cooperative.         Cognition and Memory: Cognition is impaired. Memory is impaired.         Assessment/Plan  Problem List Items Addressed This Visit       Diabetes mellitus (Multi)     Monitor fasting blood sugar  Glucoscans         Hypertension     Monitor blood pressure  Metoprolol         Weakness - Primary     Requires rehabilitation   Monitor progress          Dementia (Multi)     Monitor for safety  Assist with ADLs.  Donepezil  Depakote          Medications, treatments, and labs reviewed  Continue medications and  treatments as listed in EMR    MARY Munoz      Electronically Signed By: MARY Munoz   6/29/24  1:36 PM

## 2024-06-29 PROBLEM — L03.90 CELLULITIS: Status: RESOLVED | Noted: 2024-05-14 | Resolved: 2024-06-29

## 2024-06-29 PROBLEM — M19.90 ARTHRITIS: Status: RESOLVED | Noted: 2024-05-16 | Resolved: 2024-06-29

## 2024-06-29 PROBLEM — K21.9 GERD (GASTROESOPHAGEAL REFLUX DISEASE): Status: RESOLVED | Noted: 2024-05-14 | Resolved: 2024-06-29

## 2024-06-29 NOTE — PROGRESS NOTES
PROGRESS NOTE    Subjective   Chief complaint: Christina Davila is a 83 y.o. female who is an acute skilled patient being seen and evaluated for weakness    HPI: she is actively responding. Pleasant and talkative.  Care giver reports that her appetite is good. Requires extensive assistance with all HOC and mobility.   Has nonproductive cough. No respiratory congestion or wheezing . Remains afebrile   Remains in therapy   HPI      Objective   Vital signs: 119/80-76-18-98.3     Physical Exam  Vitals and nursing note reviewed.   Constitutional:       General: She is awake. She is not in acute distress.     Appearance: She is underweight.      Comments: Actively responding talkative    Eyes:      Extraocular Movements: Extraocular movements intact.   Cardiovascular:      Rate and Rhythm: Normal rate and regular rhythm.   Pulmonary:      Effort: Pulmonary effort is normal.      Breath sounds: Normal breath sounds.      Comments: Lungs clear   Has occassional dry cough   Abdominal:      General: Bowel sounds are normal.      Palpations: Abdomen is soft.   Musculoskeletal:      Cervical back: Neck supple.      Right lower leg: No edema.      Left lower leg: No edema.      Comments: Hands are contracted, arthritic changes   Limited movement in all extremities    Psychiatric:         Mood and Affect: Mood normal.         Behavior: Behavior is cooperative.         Cognition and Memory: Cognition is impaired. Memory is impaired.         Assessment/Plan   Problem List Items Addressed This Visit       Diabetes mellitus (Multi)     Monitor fasting blood sugar  Glucoscans         Hypertension     Monitor blood pressure  Metoprolol         Weakness - Primary     Requires rehabilitation   Monitor progress          Dementia (Multi)     Monitor for safety  Assist with ADLs.  Donepezil  Depakote          Medications, treatments, and labs reviewed  Continue medications and treatments as listed in EMR    Stella Duncan, APRN-CNP

## 2024-07-01 ENCOUNTER — NURSING HOME VISIT (OUTPATIENT)
Dept: POST ACUTE CARE | Facility: EXTERNAL LOCATION | Age: 84
End: 2024-07-01
Payer: COMMERCIAL

## 2024-07-01 DIAGNOSIS — I10 HYPERTENSION, UNSPECIFIED TYPE: ICD-10-CM

## 2024-07-01 DIAGNOSIS — J18.9 PNEUMONIA DUE TO INFECTIOUS ORGANISM, UNSPECIFIED LATERALITY, UNSPECIFIED PART OF LUNG: Primary | ICD-10-CM

## 2024-07-01 DIAGNOSIS — F03.90 DEMENTIA, UNSPECIFIED DEMENTIA SEVERITY, UNSPECIFIED DEMENTIA TYPE, UNSPECIFIED WHETHER BEHAVIORAL, PSYCHOTIC, OR MOOD DISTURBANCE OR ANXIETY (MULTI): ICD-10-CM

## 2024-07-01 DIAGNOSIS — R53.1 WEAKNESS: ICD-10-CM

## 2024-07-01 DIAGNOSIS — F32.A DEPRESSION, UNSPECIFIED DEPRESSION TYPE: ICD-10-CM

## 2024-07-01 PROCEDURE — 99308 SBSQ NF CARE LOW MDM 20: CPT | Performed by: INTERNAL MEDICINE

## 2024-07-01 NOTE — PROGRESS NOTES
PROGRESS NOTE    Subjective   Chief complaint: Christina Davila is a 83 y.o. female who is an acute skilled patient being seen and evaluated for weakness    HPI:  Therapy has been working with the patient to improve strength and endurance with ADLs, transfers, and mobility.  Patient continues to work toward goals. Patient completing multiple therapeutic exercises to increase strength, mobility and flexibility. ST addressing speech and cognition. one step directions by speaker to increase comprehension and training in use of one-word nouns or actions by speaker to facilitate patient follow through. Given mod-max verbal cues, environmental modifications and structuring, skilled observation and corrective feedback.  Patient is stable and has no new complaints.  Nursing staff voices no new concerns today.      Objective   Vital signs: 124/75,75,96%    Physical Exam  Constitutional:       General: She is not in acute distress.  Eyes:      Extraocular Movements: Extraocular movements intact.   Cardiovascular:      Rate and Rhythm: Normal rate and regular rhythm.   Pulmonary:      Effort: Pulmonary effort is normal.      Breath sounds: Normal breath sounds.   Abdominal:      General: Bowel sounds are normal.      Palpations: Abdomen is soft.   Genitourinary:     Comments: Rodriguez  Musculoskeletal:      Cervical back: Neck supple.      Right lower leg: No edema.      Left lower leg: No edema.   Neurological:      Mental Status: She is alert.      Motor: Weakness present.   Psychiatric:         Mood and Affect: Mood normal.         Behavior: Behavior is cooperative.         Assessment/Plan   Problem List Items Addressed This Visit       Hypertension     Monitor blood pressure  Metoprolol         Weakness     Requires rehabilitation   Monitor progress          Depression     Monitor mood and behaviors         Dementia (Multi)     Monitor for safety  Assist with ADLs.  Donepezil  Depakote         Pneumonia - Primary     Completed  antibiotics in hospital.  Aerosol treatment.  Mucinex          Medications, treatments, and labs reviewed  Continue medications and treatments as listed in EMR    Scribe Attestation  I, Patience Shantel Eddy   attest that this documentation has been prepared under the direction and in the presence of Christian Mazariegos MD.     Provider Attestation - Scribe documentation  All medical record entries made by the Scribe were at my direction and personally dictated by me. I have reviewed the chart and agree that the record accurately reflects my personal performance of the history, physical exam, discussion and plan.   Christian Mazariegos MD

## 2024-07-01 NOTE — LETTER
Patient: Christina Davila  : 1940    Encounter Date: 2024    PROGRESS NOTE    Subjective  Chief complaint: Christina Davila is a 83 y.o. female who is an acute skilled patient being seen and evaluated for weakness    HPI:  Therapy has been working with the patient to improve strength and endurance with ADLs, transfers, and mobility.  Patient continues to work toward goals. Patient completing multiple therapeutic exercises to increase strength, mobility and flexibility. ST addressing speech and cognition. one step directions by speaker to increase comprehension and training in use of one-word nouns or actions by speaker to facilitate patient follow through. Given mod-max verbal cues, environmental modifications and structuring, skilled observation and corrective feedback.  Patient is stable and has no new complaints.  Nursing staff voices no new concerns today.      Objective  Vital signs: 124/75,75,96%    Physical Exam  Constitutional:       General: She is not in acute distress.  Eyes:      Extraocular Movements: Extraocular movements intact.   Cardiovascular:      Rate and Rhythm: Normal rate and regular rhythm.   Pulmonary:      Effort: Pulmonary effort is normal.      Breath sounds: Normal breath sounds.   Abdominal:      General: Bowel sounds are normal.      Palpations: Abdomen is soft.   Genitourinary:     Comments: Rodriguez  Musculoskeletal:      Cervical back: Neck supple.      Right lower leg: No edema.      Left lower leg: No edema.   Neurological:      Mental Status: She is alert.      Motor: Weakness present.   Psychiatric:         Mood and Affect: Mood normal.         Behavior: Behavior is cooperative.         Assessment/Plan  Problem List Items Addressed This Visit       Hypertension     Monitor blood pressure  Metoprolol         Weakness     Requires rehabilitation   Monitor progress          Depression     Monitor mood and behaviors         Dementia (Multi)     Monitor for safety  Assist with  ADLs.  Donepezil  Depakote         Pneumonia - Primary     Completed antibiotics in hospital.  Aerosol treatment.  Mucinex          Medications, treatments, and labs reviewed  Continue medications and treatments as listed in EMR    Scribe Attestation  Chiquita JAY Scribe   attest that this documentation has been prepared under the direction and in the presence of Christian Mazariegos MD.     Provider Attestation - Scribe documentation  All medical record entries made by the Scribe were at my direction and personally dictated by me. I have reviewed the chart and agree that the record accurately reflects my personal performance of the history, physical exam, discussion and plan.   Christian Mazariegos MD      Electronically Signed By: Christian Mazariegos MD   7/1/24  4:33 PM

## 2024-07-03 ENCOUNTER — NURSING HOME VISIT (OUTPATIENT)
Dept: POST ACUTE CARE | Facility: EXTERNAL LOCATION | Age: 84
End: 2024-07-03
Payer: COMMERCIAL

## 2024-07-03 DIAGNOSIS — F03.90 DEMENTIA, UNSPECIFIED DEMENTIA SEVERITY, UNSPECIFIED DEMENTIA TYPE, UNSPECIFIED WHETHER BEHAVIORAL, PSYCHOTIC, OR MOOD DISTURBANCE OR ANXIETY (MULTI): ICD-10-CM

## 2024-07-03 DIAGNOSIS — I10 HYPERTENSION, UNSPECIFIED TYPE: Primary | ICD-10-CM

## 2024-07-03 DIAGNOSIS — E10.9 TYPE 1 DIABETES MELLITUS WITHOUT COMPLICATION (MULTI): ICD-10-CM

## 2024-07-03 DIAGNOSIS — R53.1 WEAKNESS: ICD-10-CM

## 2024-07-03 PROCEDURE — 99308 SBSQ NF CARE LOW MDM 20: CPT | Performed by: INTERNAL MEDICINE

## 2024-07-03 NOTE — LETTER
Patient: Christina Davila  : 1940    Encounter Date: 2024    PROGRESS NOTE    Subjective  Chief complaint: Christina Davila is a 83 y.o. female who is an acute skilled patient being seen and evaluated for weakness    HPI:  HPI  Patient does continue to work in therapy due to generalized weakness.  Patient is working towards goals.  PT OT working with patient on therapeutic exercise and activities, neuromuscular education, patient education and ADLs.  Speech therapy is working with patient to address cognition.  Patient was seen and examined at the bedside, appears to be in no acute distress.    Objective  Vital signs: 121/60, 97.8, 82, 18, 98%    Physical Exam  Constitutional:       General: She is not in acute distress.  Eyes:      Extraocular Movements: Extraocular movements intact.   Pulmonary:      Effort: Pulmonary effort is normal.   Musculoskeletal:      Cervical back: Neck supple.   Neurological:      Mental Status: She is alert.   Psychiatric:         Mood and Affect: Mood normal.         Behavior: Behavior is cooperative.         Assessment/Plan  Problem List Items Addressed This Visit       Diabetes mellitus (Multi)     Monitor fasting blood sugar  Glucoscans         Hypertension - Primary     Monitor blood pressure  Metoprolol         Weakness     Continue working towards established goals in therapy         Dementia (Multi)     Monitor for safety  Assist with ADLs.  Donepezil  Depakote  Speech therapy          Medications, treatments, and labs reviewed  Continue medications and treatments as listed in EMR      Scribe Attestation  I, Shantel Chen   attest that this documentation has been prepared under the direction and in the presence of Christian Mazariegos MD    Provider Attestation - Scribe documentation  All medical record entries made by the Scribe were at my direction and personally dictated by me. I have reviewed the chart and agree that the record accurately reflects my personal  performance of the history, physical exam, discussion and plan.   Christian Mazariegos MD        Electronically Signed By: Christian Mazariegos MD   7/4/24  7:32 AM

## 2024-07-03 NOTE — PROGRESS NOTES
PROGRESS NOTE    Subjective   Chief complaint: Christina Davila is a 83 y.o. female who is an acute skilled patient being seen and evaluated for weakness    HPI:  HPI  Patient does continue to work in therapy due to generalized weakness.  Patient is working towards goals.  PT OT working with patient on therapeutic exercise and activities, neuromuscular education, patient education and ADLs.  Speech therapy is working with patient to address cognition.  Patient was seen and examined at the bedside, appears to be in no acute distress.    Objective   Vital signs: 121/60, 97.8, 82, 18, 98%    Physical Exam  Constitutional:       General: She is not in acute distress.  Eyes:      Extraocular Movements: Extraocular movements intact.   Pulmonary:      Effort: Pulmonary effort is normal.   Musculoskeletal:      Cervical back: Neck supple.   Neurological:      Mental Status: She is alert.   Psychiatric:         Mood and Affect: Mood normal.         Behavior: Behavior is cooperative.         Assessment/Plan   Problem List Items Addressed This Visit       Diabetes mellitus (Multi)     Monitor fasting blood sugar  Glucoscans         Hypertension - Primary     Monitor blood pressure  Metoprolol         Weakness     Continue working towards established goals in therapy         Dementia (Multi)     Monitor for safety  Assist with ADLs.  Donepezil  Depakote  Speech therapy          Medications, treatments, and labs reviewed  Continue medications and treatments as listed in EMR      Scribe Attestation  I, Kaiden Chenibalan   attest that this documentation has been prepared under the direction and in the presence of Christian Mazariegos MD    Provider Attestation - Scribe documentation  All medical record entries made by the Scribe were at my direction and personally dictated by me. I have reviewed the chart and agree that the record accurately reflects my personal performance of the history, physical exam, discussion and plan.   Christian  PEDRO Mazariegos MD

## 2024-07-08 ENCOUNTER — NURSING HOME VISIT (OUTPATIENT)
Dept: POST ACUTE CARE | Facility: EXTERNAL LOCATION | Age: 84
End: 2024-07-08
Payer: COMMERCIAL

## 2024-07-08 DIAGNOSIS — I10 HYPERTENSION, UNSPECIFIED TYPE: ICD-10-CM

## 2024-07-08 DIAGNOSIS — F32.A DEPRESSION, UNSPECIFIED DEPRESSION TYPE: ICD-10-CM

## 2024-07-08 DIAGNOSIS — F03.90 DEMENTIA, UNSPECIFIED DEMENTIA SEVERITY, UNSPECIFIED DEMENTIA TYPE, UNSPECIFIED WHETHER BEHAVIORAL, PSYCHOTIC, OR MOOD DISTURBANCE OR ANXIETY (MULTI): ICD-10-CM

## 2024-07-08 DIAGNOSIS — R53.1 WEAKNESS: Primary | ICD-10-CM

## 2024-07-08 PROCEDURE — 99309 SBSQ NF CARE MODERATE MDM 30: CPT | Performed by: INTERNAL MEDICINE

## 2024-07-08 NOTE — LETTER
Patient: Christina Davila  : 1940    Encounter Date: 2024    PROGRESS NOTE    Subjective  Chief complaint: Christina Davila is a 83 y.o. female who is a long term care patient being seen and evaluated for weakness.     HPI:  Patient presents for f/u therapy and general medical care.  Patient seen and examined at beside.  Therapy has been working with the patient to improve strength, endurance, ADLs, and transfers d/t generalized weakness. WC to bed transfer with Max A x 2, positioning pt in bed with Max A x 2. Attempted sit to stand from WC multiple times with verbal & tactile cues for hand placement. Pt very apprehensive. Requires Max A x 2 for WC to bed ransfer and positioning in bed. ST addressing cognition language skills. Patient has no acute concerns today.      Objective  Vital signs: 130/89,65,97%    Physical Exam  Constitutional:       General: She is not in acute distress.  Eyes:      Extraocular Movements: Extraocular movements intact.   Pulmonary:      Effort: Pulmonary effort is normal.   Musculoskeletal:      Cervical back: Neck supple.   Neurological:      Mental Status: She is alert.   Psychiatric:         Mood and Affect: Mood normal.         Behavior: Behavior is cooperative.         Assessment/Plan  Problem List Items Addressed This Visit       Hypertension     Monitor blood pressure  Metoprolol         Weakness - Primary     Continue working towards established goals in therapy         Depression     Monitor mood and behaviors         Dementia (Multi)     Monitor for safety  Assist with ADLs.  Donepezil  Depakote  Speech therapy          Medications, treatments, and labs reviewed  Continue medications and treatments as listed in EMR      Scribe Attestation  I, Shantel Vaelntin   attest that this documentation has been prepared under the direction and in the presence of Christian Mazariegos MD.     Provider Attestation - Scribe documentation  All medical record entries made by the Scribe  were at my direction and personally dictated by me. I have reviewed the chart and agree that the record accurately reflects my personal performance of the history, physical exam, discussion and plan.   Christian Mazariegos MD      Electronically Signed By: Christian Mazariegos MD   7/10/24  3:34 PM

## 2024-07-09 ENCOUNTER — NURSING HOME VISIT (OUTPATIENT)
Dept: POST ACUTE CARE | Facility: EXTERNAL LOCATION | Age: 84
End: 2024-07-09
Payer: COMMERCIAL

## 2024-07-09 DIAGNOSIS — F32.A DEPRESSION, UNSPECIFIED DEPRESSION TYPE: ICD-10-CM

## 2024-07-09 DIAGNOSIS — E78.5 HYPERLIPIDEMIA, UNSPECIFIED HYPERLIPIDEMIA TYPE: ICD-10-CM

## 2024-07-09 DIAGNOSIS — E46 MALNUTRITION, UNSPECIFIED TYPE (MULTI): ICD-10-CM

## 2024-07-09 DIAGNOSIS — E10.9 TYPE 1 DIABETES MELLITUS WITHOUT COMPLICATION (MULTI): ICD-10-CM

## 2024-07-09 DIAGNOSIS — F03.90 DEMENTIA, UNSPECIFIED DEMENTIA SEVERITY, UNSPECIFIED DEMENTIA TYPE, UNSPECIFIED WHETHER BEHAVIORAL, PSYCHOTIC, OR MOOD DISTURBANCE OR ANXIETY (MULTI): ICD-10-CM

## 2024-07-09 DIAGNOSIS — R53.1 WEAKNESS: Primary | ICD-10-CM

## 2024-07-09 PROCEDURE — 99309 SBSQ NF CARE MODERATE MDM 30: CPT | Performed by: NURSE PRACTITIONER

## 2024-07-09 NOTE — LETTER
Patient: Christina Davila  : 1940    Encounter Date: 2024    PROGRESS NOTE    Subjective  Chief complaint: Christina Davila is a 83 y.o. female who is an acute skilled patient being seen and evaluated for weakness    HPI: remains in bed. Actively responding. Nursing reports that she has a good appetite, requires to be feed by staff.  Requires extensive assistance from staff for all HOC and mobility. Denies any pain or discomfort.    HPI      Objective  Vital signs: 119/67-82-18-98.3     Physical Exam  Vitals and nursing note reviewed.   Constitutional:       General: She is not in acute distress.     Appearance: She is well-groomed and underweight.   Eyes:      Extraocular Movements: Extraocular movements intact.   Cardiovascular:      Rate and Rhythm: Normal rate and regular rhythm.   Pulmonary:      Effort: Pulmonary effort is normal.      Breath sounds: Normal breath sounds.      Comments: Lungs clear   Abdominal:      General: Bowel sounds are normal.      Palpations: Abdomen is soft.   Musculoskeletal:      Cervical back: Neck supple.      Right lower leg: No edema.      Left lower leg: No edema.   Neurological:      Mental Status: She is alert.   Psychiatric:         Mood and Affect: Mood normal.         Behavior: Behavior is cooperative.         Cognition and Memory: Cognition is impaired. Memory is impaired.      Comments: No awareness for safety          Assessment/Plan  Problem List Items Addressed This Visit       Diabetes mellitus (Multi)     Monitor fasting blood sugar  Glucoscans         Hyperlipidemia     Statin  Monitor lipids and LFTs         Weakness - Primary     Continue working in therapy         Depression     Monitor mood and behaviors         Malnutrition (Multi)     Underweight   Appetite - consistent   Monitor    Encourage supplements          Dementia (Multi)     Monitor for safety  Assist with ADLs.  Donepezil  Depakote  Speech therapy          Medications, treatments, and labs  reviewed  Continue medications and treatments as listed in EMR    MARY Munoz      Electronically Signed By: MARY Munoz   7/13/24 12:00 PM

## 2024-07-09 NOTE — PROGRESS NOTES
PROGRESS NOTE    Subjective   Chief complaint: Christina Davila is a 83 y.o. female who is a long term care patient being seen and evaluated for weakness.     HPI:  Patient presents for f/u therapy and general medical care.  Patient seen and examined at beside.  Therapy has been working with the patient to improve strength, endurance, ADLs, and transfers d/t generalized weakness. WC to bed transfer with Max A x 2, positioning pt in bed with Max A x 2. Attempted sit to stand from WC multiple times with verbal & tactile cues for hand placement. Pt very apprehensive. Requires Max A x 2 for WC to bed ransfer and positioning in bed. ST addressing cognition language skills. Patient has no acute concerns today.      Objective   Vital signs: 130/89,65,97%    Physical Exam  Constitutional:       General: She is not in acute distress.  Eyes:      Extraocular Movements: Extraocular movements intact.   Pulmonary:      Effort: Pulmonary effort is normal.   Musculoskeletal:      Cervical back: Neck supple.   Neurological:      Mental Status: She is alert.   Psychiatric:         Mood and Affect: Mood normal.         Behavior: Behavior is cooperative.         Assessment/Plan   Problem List Items Addressed This Visit       Hypertension     Monitor blood pressure  Metoprolol         Weakness - Primary     Continue working towards established goals in therapy         Depression     Monitor mood and behaviors         Dementia (Multi)     Monitor for safety  Assist with ADLs.  Donepezil  Depakote  Speech therapy          Medications, treatments, and labs reviewed  Continue medications and treatments as listed in EMR      Scribe Attestation  Chiquita JAY Scribe   attest that this documentation has been prepared under the direction and in the presence of Christian Mazariegos MD.     Provider Attestation - Scribe documentation  All medical record entries made by the Scribe were at my direction and personally dictated by me. I have reviewed  the chart and agree that the record accurately reflects my personal performance of the history, physical exam, discussion and plan.   Christian Mazariegos MD

## 2024-07-10 ENCOUNTER — NURSING HOME VISIT (OUTPATIENT)
Dept: POST ACUTE CARE | Facility: EXTERNAL LOCATION | Age: 84
End: 2024-07-10
Payer: COMMERCIAL

## 2024-07-10 DIAGNOSIS — I10 HYPERTENSION, UNSPECIFIED TYPE: ICD-10-CM

## 2024-07-10 DIAGNOSIS — F32.A DEPRESSION, UNSPECIFIED DEPRESSION TYPE: ICD-10-CM

## 2024-07-10 DIAGNOSIS — E78.5 HYPERLIPIDEMIA, UNSPECIFIED HYPERLIPIDEMIA TYPE: ICD-10-CM

## 2024-07-10 DIAGNOSIS — R53.1 WEAKNESS: ICD-10-CM

## 2024-07-10 DIAGNOSIS — E10.9 TYPE 1 DIABETES MELLITUS WITHOUT COMPLICATION (MULTI): ICD-10-CM

## 2024-07-10 DIAGNOSIS — F03.90 DEMENTIA, UNSPECIFIED DEMENTIA SEVERITY, UNSPECIFIED DEMENTIA TYPE, UNSPECIFIED WHETHER BEHAVIORAL, PSYCHOTIC, OR MOOD DISTURBANCE OR ANXIETY (MULTI): Primary | ICD-10-CM

## 2024-07-10 PROCEDURE — 99309 SBSQ NF CARE MODERATE MDM 30: CPT | Performed by: INTERNAL MEDICINE

## 2024-07-10 NOTE — PROGRESS NOTES
PROGRESS NOTE    Subjective   Chief complaint: Christina Davila is a 83 y.o. female who is an acute skilled patient being seen and evaluated for weakness    HPI: remains in bed. Actively responding. Nursing reports that she has a good appetite, requires to be feed by staff.  Requires extensive assistance from staff for all HOC and mobility. Denies any pain or discomfort.    HPI      Objective   Vital signs: 119/67-82-18-98.3     Physical Exam  Vitals and nursing note reviewed.   Constitutional:       General: She is not in acute distress.     Appearance: She is well-groomed and underweight.   Eyes:      Extraocular Movements: Extraocular movements intact.   Cardiovascular:      Rate and Rhythm: Normal rate and regular rhythm.   Pulmonary:      Effort: Pulmonary effort is normal.      Breath sounds: Normal breath sounds.      Comments: Lungs clear   Abdominal:      General: Bowel sounds are normal.      Palpations: Abdomen is soft.   Musculoskeletal:      Cervical back: Neck supple.      Right lower leg: No edema.      Left lower leg: No edema.   Neurological:      Mental Status: She is alert.   Psychiatric:         Mood and Affect: Mood normal.         Behavior: Behavior is cooperative.         Cognition and Memory: Cognition is impaired. Memory is impaired.      Comments: No awareness for safety          Assessment/Plan   Problem List Items Addressed This Visit       Diabetes mellitus (Multi)     Monitor fasting blood sugar  Glucoscans         Hyperlipidemia     Statin  Monitor lipids and LFTs         Weakness - Primary     Continue working in therapy         Depression     Monitor mood and behaviors         Malnutrition (Multi)     Underweight   Appetite - consistent   Monitor    Encourage supplements          Dementia (Multi)     Monitor for safety  Assist with ADLs.  Donepezil  Depakote  Speech therapy          Medications, treatments, and labs reviewed  Continue medications and treatments as listed in [facility]  TAMI Duncan-CNP

## 2024-07-10 NOTE — LETTER
Patient: Christina Davila  : 1940    Encounter Date: 07/10/2024    PROGRESS NOTE    Subjective  Chief complaint: Christina Davila is a 83 y.o. female who is an acute skilled patient being seen and evaluated for weakness and monthly general medical care and follow-up.    HPI:  HPI  Patient presents for general medical care and f/u.  Patient seen and examined at bedside.  No issues per nursing.  Patient has no acute complaints.  Patient does continue to work in therapy, working on therapeutic exercise and activities, ADLs and self-care.  Speech therapy working patient to address cognition.  DM, denies polydipsia polyuria polyphagia.  Patient has dementia and requires assist with ADLs.Patient with diagnosis of depression.  Mood is stable.  Denies feeling down and thoughts of harming self or others.  HTN BP at goal.  Denies chest pain and headache.  HLD denies angina or leg cramps, muscle aches.  Mentation at baseline, no acute distress    Objective  Vital signs: 123/76, 98.2, 7017, 97%    Physical Exam  Constitutional:       General: She is not in acute distress.  Eyes:      Extraocular Movements: Extraocular movements intact.   Pulmonary:      Effort: Pulmonary effort is normal.   Musculoskeletal:      Cervical back: Neck supple.   Neurological:      Mental Status: She is alert.      Motor: Weakness present.   Psychiatric:         Mood and Affect: Mood normal.         Behavior: Behavior is cooperative.         Assessment/Plan  Problem List Items Addressed This Visit       Diabetes mellitus (Multi)     Monitor fasting blood sugar  Glucoscans         Hyperlipidemia     Statin  Monitor lipids and LFTs         Hypertension     Monitor blood pressure  Metoprolol         Weakness     Continue working in therapy         Depression     Monitor mood and behaviors         Dementia (Multi) - Primary     Monitor for safety  Assist with ADLs.  Donepezil  Depakote  Speech therapy          Medications, treatments, and labs  reviewed  Continue medications and treatments as listed in EMR      Scribe Attestation  I, Shantel Chen   attest that this documentation has been prepared under the direction and in the presence of Christian Mazariegos MD    Provider Attestation - Scribe documentation  All medical record entries made by the Scribe were at my direction and personally dictated by me. I have reviewed the chart and agree that the record accurately reflects my personal performance of the history, physical exam, discussion and plan.   Christian Mazariegos MD        Electronically Signed By: Christian Mazariegos MD   7/11/24 11:52 AM

## 2024-07-10 NOTE — PROGRESS NOTES
PROGRESS NOTE    Subjective   Chief complaint: Christina Davila is a 83 y.o. female who is an acute skilled patient being seen and evaluated for weakness and monthly general medical care and follow-up.    HPI:  HPI  Patient presents for general medical care and f/u.  Patient seen and examined at bedside.  No issues per nursing.  Patient has no acute complaints.  Patient does continue to work in therapy, working on therapeutic exercise and activities, ADLs and self-care.  Speech therapy working patient to address cognition.  DM, denies polydipsia polyuria polyphagia.  Patient has dementia and requires assist with ADLs.Patient with diagnosis of depression.  Mood is stable.  Denies feeling down and thoughts of harming self or others.  HTN BP at goal.  Denies chest pain and headache.  HLD denies angina or leg cramps, muscle aches.  Mentation at baseline, no acute distress    Objective   Vital signs: 123/76, 98.2, 7017, 97%    Physical Exam  Constitutional:       General: She is not in acute distress.  Eyes:      Extraocular Movements: Extraocular movements intact.   Pulmonary:      Effort: Pulmonary effort is normal.   Musculoskeletal:      Cervical back: Neck supple.   Neurological:      Mental Status: She is alert.      Motor: Weakness present.   Psychiatric:         Mood and Affect: Mood normal.         Behavior: Behavior is cooperative.         Assessment/Plan   Problem List Items Addressed This Visit       Diabetes mellitus (Multi)     Monitor fasting blood sugar  Glucoscans         Hyperlipidemia     Statin  Monitor lipids and LFTs         Hypertension     Monitor blood pressure  Metoprolol         Weakness     Continue working in therapy         Depression     Monitor mood and behaviors         Dementia (Multi) - Primary     Monitor for safety  Assist with ADLs.  Donepezil  Depakote  Speech therapy          Medications, treatments, and labs reviewed  Continue medications and treatments as listed in EMR      Scribe  Attestation  I, Shantel Chen   attest that this documentation has been prepared under the direction and in the presence of Christian Mazariegos MD    Provider Attestation - Scribe documentation  All medical record entries made by the Scribe were at my direction and personally dictated by me. I have reviewed the chart and agree that the record accurately reflects my personal performance of the history, physical exam, discussion and plan.   Christian Mazariegos MD

## 2024-07-11 ENCOUNTER — NURSING HOME VISIT (OUTPATIENT)
Dept: POST ACUTE CARE | Facility: EXTERNAL LOCATION | Age: 84
End: 2024-07-11
Payer: COMMERCIAL

## 2024-07-11 DIAGNOSIS — E46 MALNUTRITION, UNSPECIFIED TYPE (MULTI): ICD-10-CM

## 2024-07-11 DIAGNOSIS — R53.1 WEAKNESS: ICD-10-CM

## 2024-07-11 DIAGNOSIS — F03.90 DEMENTIA, UNSPECIFIED DEMENTIA SEVERITY, UNSPECIFIED DEMENTIA TYPE, UNSPECIFIED WHETHER BEHAVIORAL, PSYCHOTIC, OR MOOD DISTURBANCE OR ANXIETY (MULTI): ICD-10-CM

## 2024-07-11 DIAGNOSIS — L03.90 CELLULITIS, UNSPECIFIED CELLULITIS SITE: Primary | ICD-10-CM

## 2024-07-11 PROCEDURE — 99309 SBSQ NF CARE MODERATE MDM 30: CPT | Performed by: NURSE PRACTITIONER

## 2024-07-11 NOTE — PROGRESS NOTES
PROGRESS NOTE    Subjective   Chief complaint: Christina Davila is a 83 y.o. female who is an acute skilled patient being seen and evaluated for weakness    HPI: Nursing reports that her right leg is inflamed. Right distal leg presents with localized area  with redness, warmth and inflammation. No drainage. Doxycycline started. Remains afebrile.    Nursing instructed to matt the boarder of the redness with pen.   HPI      Objective   Vital signs: 118/64-82-18-98.3    Physical Exam  Vitals and nursing note reviewed.   Constitutional:       General: She is not in acute distress.     Appearance: She is well-groomed and underweight.   Eyes:      Extraocular Movements: Extraocular movements intact.   Cardiovascular:      Rate and Rhythm: Normal rate and regular rhythm.   Pulmonary:      Effort: Pulmonary effort is normal.      Breath sounds: Normal breath sounds.      Comments: Lungs clear   Abdominal:      General: Bowel sounds are normal.      Palpations: Abdomen is soft.   Musculoskeletal:      Cervical back: Neck supple.      Right lower leg: No edema.      Left lower leg: No edema.   Skin:     Comments: Right lower leg distal aspect  - area of redness, warmth, inflammation  No drainage    Neurological:      Mental Status: She is alert.   Psychiatric:         Mood and Affect: Mood normal.         Behavior: Behavior is cooperative.         Cognition and Memory: Cognition is impaired. Memory is impaired.      Comments: No awareness for safety          Assessment/Plan   Problem List Items Addressed This Visit       Cellulitis - Primary     Area of cellulitis - right lower leg    Doxycycline started   Will monitor skin is intact   No drainage    Monitor temp   Nursing instructed to matt edges with marker.          Weakness     Continue working in therapy         Malnutrition (Multi)     Underweight   Appetite - consistent   Monitor    Encourage supplements          Dementia (Multi)     Monitor for safety  Assist with  ADLs.  Donepezil  Depakote  Speech therapy          Medications, treatments, and labs reviewed  Continue medications and treatments as listed in EMR    Stella Duncan, APRN-CNP

## 2024-07-11 NOTE — LETTER
Patient: Christina Davila  : 1940    Encounter Date: 2024    PROGRESS NOTE    Subjective  Chief complaint: Christina Davila is a 83 y.o. female who is an acute skilled patient being seen and evaluated for weakness    HPI: Nursing reports that her right leg is inflamed. Right distal leg presents with localized area  with redness, warmth and inflammation. No drainage. Doxycycline started. Remains afebrile.    Nursing instructed to matt the boarder of the redness with pen.   HPI      Objective  Vital signs: 118/64-82-18-98.3    Physical Exam  Vitals and nursing note reviewed.   Constitutional:       General: She is not in acute distress.     Appearance: She is well-groomed and underweight.   Eyes:      Extraocular Movements: Extraocular movements intact.   Cardiovascular:      Rate and Rhythm: Normal rate and regular rhythm.   Pulmonary:      Effort: Pulmonary effort is normal.      Breath sounds: Normal breath sounds.      Comments: Lungs clear   Abdominal:      General: Bowel sounds are normal.      Palpations: Abdomen is soft.   Musculoskeletal:      Cervical back: Neck supple.      Right lower leg: No edema.      Left lower leg: No edema.   Skin:     Comments: Right lower leg distal aspect  - area of redness, warmth, inflammation  No drainage    Neurological:      Mental Status: She is alert.   Psychiatric:         Mood and Affect: Mood normal.         Behavior: Behavior is cooperative.         Cognition and Memory: Cognition is impaired. Memory is impaired.      Comments: No awareness for safety          Assessment/Plan  Problem List Items Addressed This Visit       Cellulitis - Primary     Area of cellulitis - right lower leg    Doxycycline started   Will monitor skin is intact   No drainage    Monitor temp   Nursing instructed to matt edges with marker.          Weakness     Continue working in therapy         Malnutrition (Multi)     Underweight   Appetite - consistent   Monitor    Encourage supplements           Dementia (Multi)     Monitor for safety  Assist with ADLs.  Donepezil  Depakote  Speech therapy          Medications, treatments, and labs reviewed  Continue medications and treatments as listed in EMR    MARY Munoz      Electronically Signed By: MRAY Munoz   7/13/24  3:22 PM

## 2024-07-13 NOTE — ASSESSMENT & PLAN NOTE
Area of cellulitis - right lower leg    Doxycycline started   Will monitor skin is intact   No drainage    Monitor temp   Nursing instructed to matt edges with marker.

## 2024-07-15 ENCOUNTER — NURSING HOME VISIT (OUTPATIENT)
Dept: POST ACUTE CARE | Facility: EXTERNAL LOCATION | Age: 84
End: 2024-07-15
Payer: COMMERCIAL

## 2024-07-15 DIAGNOSIS — R53.1 WEAKNESS: Primary | ICD-10-CM

## 2024-07-15 DIAGNOSIS — F32.A DEPRESSION, UNSPECIFIED DEPRESSION TYPE: ICD-10-CM

## 2024-07-15 DIAGNOSIS — F03.90 DEMENTIA, UNSPECIFIED DEMENTIA SEVERITY, UNSPECIFIED DEMENTIA TYPE, UNSPECIFIED WHETHER BEHAVIORAL, PSYCHOTIC, OR MOOD DISTURBANCE OR ANXIETY (MULTI): ICD-10-CM

## 2024-07-15 DIAGNOSIS — I10 HYPERTENSION, UNSPECIFIED TYPE: ICD-10-CM

## 2024-07-15 PROCEDURE — 99308 SBSQ NF CARE LOW MDM 20: CPT | Performed by: INTERNAL MEDICINE

## 2024-07-15 NOTE — PROGRESS NOTES
PROGRESS NOTE    Subjective   Chief complaint: Christina Davila is a 83 y.o. female who is an acute skilled patient being seen and evaluated for weakness    HPI:  Patient presents for f/u therapy and general medical care.  Patient seen and examined at beside.  Therapy has been working with the patient to improve strength, endurance, ADLs, and transfers d/t generalized weakness. Patient is requiring mod A this date to complete tasks successfully; reporting pain and joint stiffness limiting tasks. Extensive encouragement required to complete requested tasks, as patient is apprehensive, fearful of pain / fatigue. ST addressing speech skills and cognition. Mod verbal cues, repetition, environmental modifications. GOALS: 1)yes/no ?s 70% m in-mod cues 2)made needs known:70% min-mod cues.  Patient has no acute concerns today.      Objective   Vital signs: 123/74,70,97%    Physical Exam  Constitutional:       General: She is not in acute distress.  Eyes:      Extraocular Movements: Extraocular movements intact.   Pulmonary:      Effort: Pulmonary effort is normal.   Musculoskeletal:      Cervical back: Neck supple.   Neurological:      Mental Status: She is alert.      Motor: Weakness present.   Psychiatric:         Mood and Affect: Mood normal.         Behavior: Behavior is cooperative.         Assessment/Plan   Problem List Items Addressed This Visit       Hypertension     Monitor blood pressure  Metoprolol         Weakness - Primary     Continue working in therapy         Depression     Monitor mood and behaviors         Dementia (Multi)     Monitor for safety  Assist with ADLs.  Donepezil  Depakote  Speech therapy          Medications, treatments, and labs reviewed  Continue medications and treatments as listed in EMR    Scribe Attestation  I, Shantel Valentin   attest that this documentation has been prepared under the direction and in the presence of Christian Mazariegos MD.     Provider Attestation - [place]  documentation  All medical record entries made by the Scribe were at my direction and personally dictated by me. I have reviewed the chart and agree that the record accurately reflects my personal performance of the history, physical exam, discussion and plan.   Christian Mazariegos MD

## 2024-07-15 NOTE — LETTER
Patient: Christina Davila  : 1940    Encounter Date: 07/15/2024    PROGRESS NOTE    Subjective  Chief complaint: Christina Davila is a 83 y.o. female who is an acute skilled patient being seen and evaluated for weakness    HPI:  Patient presents for f/u therapy and general medical care.  Patient seen and examined at beside.  Therapy has been working with the patient to improve strength, endurance, ADLs, and transfers d/t generalized weakness. Patient is requiring mod A this date to complete tasks successfully; reporting pain and joint stiffness limiting tasks. Extensive encouragement required to complete requested tasks, as patient is apprehensive, fearful of pain / fatigue. ST addressing speech skills and cognition. Mod verbal cues, repetition, environmental modifications. GOALS: 1)yes/no ?s 70% m in-mod cues 2)made needs known:70% min-mod cues.  Patient has no acute concerns today.      Objective  Vital signs: 123/74,70,97%    Physical Exam  Constitutional:       General: She is not in acute distress.  Eyes:      Extraocular Movements: Extraocular movements intact.   Pulmonary:      Effort: Pulmonary effort is normal.   Musculoskeletal:      Cervical back: Neck supple.   Neurological:      Mental Status: She is alert.      Motor: Weakness present.   Psychiatric:         Mood and Affect: Mood normal.         Behavior: Behavior is cooperative.         Assessment/Plan  Problem List Items Addressed This Visit       Hypertension     Monitor blood pressure  Metoprolol         Weakness - Primary     Continue working in therapy         Depression     Monitor mood and behaviors         Dementia (Multi)     Monitor for safety  Assist with ADLs.  Donepezil  Depakote  Speech therapy          Medications, treatments, and labs reviewed  Continue medications and treatments as listed in EMR    Scribe Attestation  I, Shantel Valentin   attest that this documentation has been prepared under the direction and in the presence  of Christian Mazariegos MD.     Provider Attestation - Scribe documentation  All medical record entries made by the Scribe were at my direction and personally dictated by me. I have reviewed the chart and agree that the record accurately reflects my personal performance of the history, physical exam, discussion and plan.   Christian Mazariegos MD      Electronically Signed By: Christian Mazariegos MD   7/15/24  2:37 PM

## 2024-07-16 ENCOUNTER — NURSING HOME VISIT (OUTPATIENT)
Dept: POST ACUTE CARE | Facility: EXTERNAL LOCATION | Age: 84
End: 2024-07-16
Payer: COMMERCIAL

## 2024-07-16 DIAGNOSIS — I10 HYPERTENSION, UNSPECIFIED TYPE: ICD-10-CM

## 2024-07-16 DIAGNOSIS — F03.90 DEMENTIA, UNSPECIFIED DEMENTIA SEVERITY, UNSPECIFIED DEMENTIA TYPE, UNSPECIFIED WHETHER BEHAVIORAL, PSYCHOTIC, OR MOOD DISTURBANCE OR ANXIETY (MULTI): ICD-10-CM

## 2024-07-16 DIAGNOSIS — E46 MALNUTRITION, UNSPECIFIED TYPE (MULTI): ICD-10-CM

## 2024-07-16 DIAGNOSIS — R53.1 WEAKNESS: ICD-10-CM

## 2024-07-16 DIAGNOSIS — E10.9 TYPE 1 DIABETES MELLITUS WITHOUT COMPLICATION (MULTI): ICD-10-CM

## 2024-07-16 DIAGNOSIS — L03.90 CELLULITIS, UNSPECIFIED CELLULITIS SITE: Primary | ICD-10-CM

## 2024-07-16 PROCEDURE — 99309 SBSQ NF CARE MODERATE MDM 30: CPT | Performed by: NURSE PRACTITIONER

## 2024-07-16 NOTE — LETTER
Patient: Christina Davila  : 1940    Encounter Date: 2024    PROGRESS NOTE    Subjective  Chief complaint: Christina Davila is a 83 y.o. female who is a long term care patient being seen and evaluated for resolving cellulitis     HPI: Is napping in bed appears comfortable. The caregiver reports that her appetite is generally fair.   Inspected her lower leg. The redness has faded, no inflammation, no edema.    Nursing reports that she is tolerating her antibiotic. Remains afebrile.   HPI      Objective  Vital signs: 126/79-81-18-98.1     Physical Exam  Vitals and nursing note reviewed.   Constitutional:       General: She is not in acute distress.     Appearance: She is well-groomed and underweight.   Eyes:      Extraocular Movements: Extraocular movements intact.   Cardiovascular:      Rate and Rhythm: Normal rate and regular rhythm.   Pulmonary:      Effort: Pulmonary effort is normal.      Breath sounds: Normal breath sounds.      Comments: Lungs clear   Abdominal:      General: Bowel sounds are normal.      Palpations: Abdomen is soft.   Musculoskeletal:      Cervical back: Neck supple.      Right lower leg: No edema.      Left lower leg: No edema.   Skin:     Comments: Right lower leg distal aspect  - redness has faded  no inflammation  No drainage    Neurological:      Mental Status: She is alert.   Psychiatric:         Mood and Affect: Mood normal.         Behavior: Behavior is cooperative.         Cognition and Memory: Cognition is impaired. Memory is impaired.      Comments: No awareness for safety          Assessment/Plan  Problem List Items Addressed This Visit       Cellulitis - Primary     Continue antibiotic until complete.  Monitor  No redness, no inflammation   Remains afebrile            Diabetes mellitus (Multi)     Monitor fasting blood sugar  Glucoscans         Hypertension     Monitor blood pressure  Metoprolol         Weakness     Continue to work in therapy, monitor abilities   Requires  total care for all HOC and mobility from caregivers          Malnutrition (Multi)     Underweight   Appetite - consistent   Monitor    Encourage supplements          Dementia (Multi)     Monitor for safety  Assist with ADLs.  Donepezil  Depakote  Speech therapy          Medications, treatments, and labs reviewed  Continue medications and treatments as listed in EMR    MARY Munoz      Electronically Signed By: MARY Munoz   7/20/24 11:44 AM

## 2024-07-16 NOTE — PROGRESS NOTES
PROGRESS NOTE    Subjective   Chief complaint: Christina Davila is a 83 y.o. female who is a long term care patient being seen and evaluated for resolving cellulitis     HPI: Is napping in bed appears comfortable. The caregiver reports that her appetite is generally fair.   Inspected her lower leg. The redness has faded, no inflammation, no edema.    Nursing reports that she is tolerating her antibiotic. Remains afebrile.   HPI      Objective   Vital signs: 126/79-81-18-98.1     Physical Exam  Vitals and nursing note reviewed.   Constitutional:       General: She is not in acute distress.     Appearance: She is well-groomed and underweight.   Eyes:      Extraocular Movements: Extraocular movements intact.   Cardiovascular:      Rate and Rhythm: Normal rate and regular rhythm.   Pulmonary:      Effort: Pulmonary effort is normal.      Breath sounds: Normal breath sounds.      Comments: Lungs clear   Abdominal:      General: Bowel sounds are normal.      Palpations: Abdomen is soft.   Musculoskeletal:      Cervical back: Neck supple.      Right lower leg: No edema.      Left lower leg: No edema.   Skin:     Comments: Right lower leg distal aspect  - redness has faded  no inflammation  No drainage    Neurological:      Mental Status: She is alert.   Psychiatric:         Mood and Affect: Mood normal.         Behavior: Behavior is cooperative.         Cognition and Memory: Cognition is impaired. Memory is impaired.      Comments: No awareness for safety          Assessment/Plan   Problem List Items Addressed This Visit       Cellulitis - Primary     Continue antibiotic until complete.  Monitor  No redness, no inflammation   Remains afebrile            Diabetes mellitus (Multi)     Monitor fasting blood sugar  Glucoscans         Hypertension     Monitor blood pressure  Metoprolol         Weakness     Continue to work in therapy, monitor abilities   Requires total care for all HOC and mobility from caregivers           Malnutrition (Multi)     Underweight   Appetite - consistent   Monitor    Encourage supplements          Dementia (Multi)     Monitor for safety  Assist with ADLs.  Donepezil  Depakote  Speech therapy          Medications, treatments, and labs reviewed  Continue medications and treatments as listed in EMR    Stella Duncan, APRN-CNP

## 2024-07-17 ENCOUNTER — NURSING HOME VISIT (OUTPATIENT)
Dept: POST ACUTE CARE | Facility: EXTERNAL LOCATION | Age: 84
End: 2024-07-17
Payer: COMMERCIAL

## 2024-07-17 DIAGNOSIS — F03.90 DEMENTIA, UNSPECIFIED DEMENTIA SEVERITY, UNSPECIFIED DEMENTIA TYPE, UNSPECIFIED WHETHER BEHAVIORAL, PSYCHOTIC, OR MOOD DISTURBANCE OR ANXIETY (MULTI): ICD-10-CM

## 2024-07-17 DIAGNOSIS — L03.90 CELLULITIS, UNSPECIFIED CELLULITIS SITE: Primary | ICD-10-CM

## 2024-07-17 DIAGNOSIS — I10 HYPERTENSION, UNSPECIFIED TYPE: ICD-10-CM

## 2024-07-17 DIAGNOSIS — R53.1 WEAKNESS: ICD-10-CM

## 2024-07-17 PROCEDURE — 99308 SBSQ NF CARE LOW MDM 20: CPT | Performed by: INTERNAL MEDICINE

## 2024-07-17 NOTE — LETTER
Patient: Christina Davila  : 1940    Encounter Date: 2024    PROGRESS NOTE    Subjective  Chief complaint: Christina Davila is a 83 y.o. female who is an acute skilled patient being seen and evaluated for weakness    HPI:  HPI  Patient is currently working in therapy, working towards goals.  Patient is working on safe transfers from various surfaces requiring mod assist.  Speech therapy is working with patient to address cognition.  Patient does continue on antibiotic for cellulitis left lower extremity.  Patient is tolerating without adverse effects.  Patient seen and examined at the bedside, appears to be in no acute distress.    Objective  Vital signs: 121/74, 98.2, 70, 17, 97%    Physical Exam  Constitutional:       General: She is not in acute distress.  Eyes:      Extraocular Movements: Extraocular movements intact.   Pulmonary:      Effort: Pulmonary effort is normal.   Musculoskeletal:      Cervical back: Neck supple.   Neurological:      Mental Status: She is alert.      Motor: Weakness present.   Psychiatric:         Mood and Affect: Mood normal.         Behavior: Behavior is cooperative.         Assessment/Plan  Problem List Items Addressed This Visit       Cellulitis - Primary     Continue antibiotic until complete.  Monitor           Hypertension     Monitor blood pressure  Metoprolol         Weakness     Continue to work in therapy         Dementia (Multi)     Monitor for safety  Assist with ADLs.  Donepezil  Depakote  Speech therapy          Medications, treatments, and labs reviewed  Continue medications and treatments as listed in EMR      Scribe Attestation  I, Shantel Chen   attest that this documentation has been prepared under the direction and in the presence of Christian Mazariegos MD    Provider Attestation - Scribe documentation  All medical record entries made by the Scribe were at my direction and personally dictated by me. I have reviewed the chart and agree that the record  accurately reflects my personal performance of the history, physical exam, discussion and plan.   Christian Mazariegos MD        Electronically Signed By: Christian Mazariegos MD   7/17/24  3:43 PM

## 2024-07-17 NOTE — PROGRESS NOTES
PROGRESS NOTE    Subjective   Chief complaint: Christina Davila is a 83 y.o. female who is an acute skilled patient being seen and evaluated for weakness    HPI:  HPI  Patient is currently working in therapy, working towards goals.  Patient is working on safe transfers from various surfaces requiring mod assist.  Speech therapy is working with patient to address cognition.  Patient does continue on antibiotic for cellulitis left lower extremity.  Patient is tolerating without adverse effects.  Patient seen and examined at the bedside, appears to be in no acute distress.    Objective   Vital signs: 121/74, 98.2, 70, 17, 97%    Physical Exam  Constitutional:       General: She is not in acute distress.  Eyes:      Extraocular Movements: Extraocular movements intact.   Pulmonary:      Effort: Pulmonary effort is normal.   Musculoskeletal:      Cervical back: Neck supple.   Neurological:      Mental Status: She is alert.      Motor: Weakness present.   Psychiatric:         Mood and Affect: Mood normal.         Behavior: Behavior is cooperative.         Assessment/Plan   Problem List Items Addressed This Visit       Cellulitis - Primary     Continue antibiotic until complete.  Monitor           Hypertension     Monitor blood pressure  Metoprolol         Weakness     Continue to work in therapy         Dementia (Multi)     Monitor for safety  Assist with ADLs.  Donepezil  Depakote  Speech therapy          Medications, treatments, and labs reviewed  Continue medications and treatments as listed in EMR      Scribe Attestation  I, Shantel Chen   attest that this documentation has been prepared under the direction and in the presence of Christian Mazariegos MD    Provider Attestation - Scribe documentation  All medical record entries made by the Scribe were at my direction and personally dictated by me. I have reviewed the chart and agree that the record accurately reflects my personal performance of the history, physical  exam, discussion and plan.   Christian Mazariegos MD

## 2024-07-20 NOTE — ASSESSMENT & PLAN NOTE
Continue to work in therapy, monitor abilities   Requires total care for all HOC and mobility from caregivers

## 2024-07-22 ENCOUNTER — NURSING HOME VISIT (OUTPATIENT)
Dept: POST ACUTE CARE | Facility: EXTERNAL LOCATION | Age: 84
End: 2024-07-22
Payer: COMMERCIAL

## 2024-07-22 DIAGNOSIS — F32.A DEPRESSION, UNSPECIFIED DEPRESSION TYPE: ICD-10-CM

## 2024-07-22 DIAGNOSIS — R53.1 WEAKNESS: Primary | ICD-10-CM

## 2024-07-22 DIAGNOSIS — F03.90 DEMENTIA, UNSPECIFIED DEMENTIA SEVERITY, UNSPECIFIED DEMENTIA TYPE, UNSPECIFIED WHETHER BEHAVIORAL, PSYCHOTIC, OR MOOD DISTURBANCE OR ANXIETY (MULTI): ICD-10-CM

## 2024-07-22 DIAGNOSIS — I10 HYPERTENSION, UNSPECIFIED TYPE: ICD-10-CM

## 2024-07-22 PROCEDURE — 99308 SBSQ NF CARE LOW MDM 20: CPT | Performed by: INTERNAL MEDICINE

## 2024-07-22 NOTE — LETTER
Patient: Christina Davila  : 1940    Encounter Date: 2024    PROGRESS NOTE    Subjective  Chief complaint: Christina Davila is a 83 y.o. female who is an acute skilled patient being seen and evaluated for weakness    HPI:  Patient has been working in therapy to improve strength, endurance, and ADLs.  Patient continues to work toward goals. BUE AAROM ther ex, shoulder, elbow flex/ext, scap ret/pro, shoulder rolls forward/backward with min restbreaks prn with fair tolerance.  Energy conservation during ADLs and energy conservation during ADLs. SBA with grooming, Dep perineal hygiene, and min/mod A with positioning and don/doff with undergarment. ST addressing speech therapy.  Techniques for cueing hierarchy to increase functional goals and graded choice presentation to facilitate receptive/expressive abilities training in use of concrete, one step directions by speaker to increase comprehension and training in use of one-word nouns or actions by speaker to facilitate patient follow through.  No new concerns today.  Denies n/v/f/c pain.        Objective  Vital signs: 124/75,70,95%    Physical Exam  Constitutional:       General: She is not in acute distress.  Eyes:      Extraocular Movements: Extraocular movements intact.   Pulmonary:      Effort: Pulmonary effort is normal.   Musculoskeletal:      Cervical back: Neck supple.   Neurological:      Mental Status: She is alert.      Motor: Weakness present.   Psychiatric:         Mood and Affect: Mood normal.         Behavior: Behavior is cooperative.         Assessment/Plan  Problem List Items Addressed This Visit       Hypertension     Monitor blood pressure  Metoprolol         Weakness - Primary     Continue to work in therapy, monitor abilities           Depression     Monitor mood and behaviors         Dementia (Multi)     Monitor for safety  Assist with ADLs.  Donepezil  Depakote  Speech therapy          Medications, treatments, and labs reviewed  Continue  medications and treatments as listed in EMR    Scribe Attestation  Chiquita JAY Scribe   attest that this documentation has been prepared under the direction and in the presence of Christian Mazariegos MD.     Provider Attestation - Scribe documentation  All medical record entries made by the Scribe were at my direction and personally dictated by me. I have reviewed the chart and agree that the record accurately reflects my personal performance of the history, physical exam, discussion and plan.   Christian Mazariegos MD      Electronically Signed By: Christian Mazariegos MD   7/22/24  2:13 PM

## 2024-07-22 NOTE — PROGRESS NOTES
PROGRESS NOTE    Subjective   Chief complaint: Christina Davila is a 83 y.o. female who is an acute skilled patient being seen and evaluated for weakness    HPI:  Patient has been working in therapy to improve strength, endurance, and ADLs.  Patient continues to work toward goals. BUE AAROM ther ex, shoulder, elbow flex/ext, scap ret/pro, shoulder rolls forward/backward with min restbreaks prn with fair tolerance.  Energy conservation during ADLs and energy conservation during ADLs. SBA with grooming, Dep perineal hygiene, and min/mod A with positioning and don/doff with undergarment. ST addressing speech therapy.  Techniques for cueing hierarchy to increase functional goals and graded choice presentation to facilitate receptive/expressive abilities training in use of concrete, one step directions by speaker to increase comprehension and training in use of one-word nouns or actions by speaker to facilitate patient follow through.  No new concerns today.  Denies n/v/f/c pain.        Objective   Vital signs: 124/75,70,95%    Physical Exam  Constitutional:       General: She is not in acute distress.  Eyes:      Extraocular Movements: Extraocular movements intact.   Pulmonary:      Effort: Pulmonary effort is normal.   Musculoskeletal:      Cervical back: Neck supple.   Neurological:      Mental Status: She is alert.      Motor: Weakness present.   Psychiatric:         Mood and Affect: Mood normal.         Behavior: Behavior is cooperative.         Assessment/Plan   Problem List Items Addressed This Visit       Hypertension     Monitor blood pressure  Metoprolol         Weakness - Primary     Continue to work in therapy, monitor abilities           Depression     Monitor mood and behaviors         Dementia (Multi)     Monitor for safety  Assist with ADLs.  Donepezil  Depakote  Speech therapy          Medications, treatments, and labs reviewed  Continue medications and treatments as listed in EMR    Scribe Attestation  I,  Kaiden Valentinibe   attest that this documentation has been prepared under the direction and in the presence of Christian Mazariegos MD.     Provider Attestation - Scribe documentation  All medical record entries made by the Scribe were at my direction and personally dictated by me. I have reviewed the chart and agree that the record accurately reflects my personal performance of the history, physical exam, discussion and plan.   Christian Mazariegos MD

## 2024-07-23 ENCOUNTER — NURSING HOME VISIT (OUTPATIENT)
Dept: POST ACUTE CARE | Facility: EXTERNAL LOCATION | Age: 84
End: 2024-07-23
Payer: COMMERCIAL

## 2024-07-23 DIAGNOSIS — R53.1 WEAKNESS: ICD-10-CM

## 2024-07-23 DIAGNOSIS — E46 MALNUTRITION, UNSPECIFIED TYPE (MULTI): ICD-10-CM

## 2024-07-23 DIAGNOSIS — I10 HYPERTENSION, UNSPECIFIED TYPE: ICD-10-CM

## 2024-07-23 DIAGNOSIS — F03.90 DEMENTIA, UNSPECIFIED DEMENTIA SEVERITY, UNSPECIFIED DEMENTIA TYPE, UNSPECIFIED WHETHER BEHAVIORAL, PSYCHOTIC, OR MOOD DISTURBANCE OR ANXIETY (MULTI): Primary | ICD-10-CM

## 2024-07-23 PROCEDURE — 99309 SBSQ NF CARE MODERATE MDM 30: CPT | Performed by: NURSE PRACTITIONER

## 2024-07-23 NOTE — LETTER
Patient: Christina Davila  : 1940    Encounter Date: 2024    PROGRESS NOTE    Subjective  Chief complaint: Christina Davila is a 83 y.o. female who is an acute skilled patient being seen and evaluated for weakness    HPI: Just returned from the therapy gym. The therapist reports that actually she does better in her room. The therapist reported in the gym she is constantly requesting to return to bed., however in the room she is able to focus her attention.   She is awake and talkative. Sitting upright in wheelchair. Denies any pain.      HPI      Objective  Vital signs: 108/64-76-18-98.4     Physical Exam  Vitals and nursing note reviewed.   Constitutional:       General: She is not in acute distress.     Appearance: She is well-groomed and underweight.   Eyes:      Extraocular Movements: Extraocular movements intact.   Cardiovascular:      Rate and Rhythm: Normal rate and regular rhythm.   Pulmonary:      Effort: Pulmonary effort is normal.      Breath sounds: Normal breath sounds.      Comments: Lungs clear   Abdominal:      General: Bowel sounds are normal.      Palpations: Abdomen is soft.   Musculoskeletal:      Cervical back: Neck supple.      Right lower leg: No edema.      Left lower leg: No edema.   Skin:     Comments: Right lower leg distal aspect  - redness has faded  no inflammation  No drainage    Neurological:      Mental Status: She is alert.   Psychiatric:         Mood and Affect: Mood normal.         Behavior: Behavior is cooperative.         Cognition and Memory: Cognition is impaired. Memory is impaired.      Comments: No awareness for safety          Assessment/Plan  Problem List Items Addressed This Visit       Hypertension     Monitor blood pressure  Metoprolol  Bp at goal          Weakness     Continue to work in therapy, monitor abilities  Therapy reports that she is more cooperative in her room for all exercises            Malnutrition (Multi)     Underweight   Appetite - consistent    Monitor    Encourage supplements          Dementia (Multi) - Primary     Monitor for safety  Assist with ADLs.  Donepezil  Depakote  Encourage all abilities           Medications, treatments, and labs reviewed  Continue medications and treatments as listed in EMR    MARY Munoz      Electronically Signed By: MARY Munoz   7/27/24  9:53 PM

## 2024-07-23 NOTE — PROGRESS NOTES
Comprehensive Nutrition Assessment    Type and Reason for Visit:  Initial(los 7)    Nutrition Recommendations/Plan:   · Modify Carb Control 4 to 5, to meet energy needs  · Begin wound healing and low negro high protein supplements to optimize intake remainder of stay  · Review nutrition ed personally, as able    Nutrition Assessment:  Pt admitted with diabetic foot ulcer, post op amputation. Feeding self on Carb Control 4 Diet, no intake recorded. Pt n/a during room visit. Will order oral supplements to optimize healing. Managing Diabetes and Wound Healing Nutrition Therapies (Nutrition Care Manual) left at bedside. Will continue to follow as moderate nutrition risk. Malnutrition Assessment:  Malnutrition Status:  Insufficient data    Context:  Acute Illness       Estimated Daily Nutrient Needs:  Energy (kcal):  8752-0160 (25-30 kcal/kg IBW); Weight Used for Energy Requirements:  Ideal     Protein (g):   (1.2-1.4 g/kg); Weight Used for Protein Requirements:  Current        Fluid (ml/day):  9979-2030 (1 ml/kcal); Method Used for Fluid Requirements:  1 ml/kcal      Nutrition Related Findings:  A1C 7.8      Wounds:  Surgical Incision, Diabetic Ulcer       Current Nutrition Therapies:    Dietary Nutrition Supplements: Wound Healing Oral Supplement  Dietary Nutrition Supplements: Low Calorie High Protein Supplement  DIET CARB CONTROL; Carb Control: 5 carb choices (75 gms)/meal    Anthropometric Measures:  · Height: 6' (182.9 cm)  · Current Body Weight: 170 lb (77.1 kg)   · Admission Body Weight: 163 lb 4.8 oz (74.1 kg)    · Usual Body Weight: 175 lb 0.7 oz (79.4 kg)(6/13/20)     · Ideal Body Weight: 178 lbs; % Ideal Body Weight 95.5 %   · BMI: 23.1  · BMI Categories: Normal Weight (BMI 18.5-24. 9)       Nutrition Diagnosis:   · Predicted inadequate energy intake related to increase demand for energy/nutrients as evidenced by wounds    Nutrition Interventions:   Food and/or Nutrient Delivery:  Modify Current PROGRESS NOTE    Subjective   Chief complaint: Christina Davila is a 83 y.o. female who is an acute skilled patient being seen and evaluated for weakness    HPI: Just returned from the therapy gym. The therapist reports that actually she does better in her room. The therapist reported in the gym she is constantly requesting to return to bed., however in the room she is able to focus her attention.   She is awake and talkative. Sitting upright in wheelchair. Denies any pain.      HPI      Objective   Vital signs: 108/64-76-18-98.4     Physical Exam  Vitals and nursing note reviewed.   Constitutional:       General: She is not in acute distress.     Appearance: She is well-groomed and underweight.   Eyes:      Extraocular Movements: Extraocular movements intact.   Cardiovascular:      Rate and Rhythm: Normal rate and regular rhythm.   Pulmonary:      Effort: Pulmonary effort is normal.      Breath sounds: Normal breath sounds.      Comments: Lungs clear   Abdominal:      General: Bowel sounds are normal.      Palpations: Abdomen is soft.   Musculoskeletal:      Cervical back: Neck supple.      Right lower leg: No edema.      Left lower leg: No edema.   Skin:     Comments: Right lower leg distal aspect  - redness has faded  no inflammation  No drainage    Neurological:      Mental Status: She is alert.   Psychiatric:         Mood and Affect: Mood normal.         Behavior: Behavior is cooperative.         Cognition and Memory: Cognition is impaired. Memory is impaired.      Comments: No awareness for safety          Assessment/Plan   Problem List Items Addressed This Visit       Hypertension     Monitor blood pressure  Metoprolol  Bp at goal          Weakness     Continue to work in therapy, monitor abilities  Therapy reports that she is more cooperative in her room for all exercises            Malnutrition (Multi)     Underweight   Appetite - consistent   Monitor    Encourage supplements          Dementia (Multi) - Primary      Monitor for safety  Assist with ADLs.  Donepezil  Depakote  Encourage all abilities           Medications, treatments, and labs reviewed  Continue medications and treatments as listed in EMR    Stella Duncan, APRN-CNP     Diet, Start Oral Nutrition Supplement  Nutrition Education/Counseling:  No recommendation at this time   Coordination of Nutrition Care:  Continue to monitor while inpatient    Goals:  Pt will consume greater tnan 75% of his meals and supplements       Nutrition Monitoring and Evaluation:   Behavioral-Environmental Outcomes:  None Identified   Food/Nutrient Intake Outcomes:  Food and Nutrient Intake, Supplement Intake  Physical Signs/Symptoms Outcomes:  Biochemical Data, Meal Time Behavior, Skin, Weight     Discharge Planning:    Continue Oral Nutrition Supplement     Electronically signed by Elmer Rosa RD, LD on 1/11/21 at 2:25 PM EST    Contact: [phone]

## 2024-07-24 ENCOUNTER — NURSING HOME VISIT (OUTPATIENT)
Dept: POST ACUTE CARE | Facility: EXTERNAL LOCATION | Age: 84
End: 2024-07-24
Payer: COMMERCIAL

## 2024-07-24 DIAGNOSIS — F03.90 DEMENTIA, UNSPECIFIED DEMENTIA SEVERITY, UNSPECIFIED DEMENTIA TYPE, UNSPECIFIED WHETHER BEHAVIORAL, PSYCHOTIC, OR MOOD DISTURBANCE OR ANXIETY (MULTI): ICD-10-CM

## 2024-07-24 DIAGNOSIS — I10 HYPERTENSION, UNSPECIFIED TYPE: ICD-10-CM

## 2024-07-24 DIAGNOSIS — R53.1 WEAKNESS: ICD-10-CM

## 2024-07-24 PROCEDURE — 99308 SBSQ NF CARE LOW MDM 20: CPT | Performed by: INTERNAL MEDICINE

## 2024-07-24 NOTE — LETTER
Patient: Christina Davila  : 1940    Encounter Date: 2024    PROGRESS NOTE    Subjective  Chief complaint: Christina Davila is a 83 y.o. female who is an acute skilled patient being seen and evaluated for weakness    HPI:  HPI Patient continues to work in therapy due to weakness. Therapeutic Activities: training in rolling, scooting, bridging to facilitate (I) bed mobility and pressure relief. Training completed on UE reaching to handrails, transitioning one LE over the other to improve efficiency with transfers. Mobility limited by ROM and strength deficits to B UE - inhibiting reaching ability. Completed static balance activities during supported sitting: B UE use to pull up on therapist 2x10 to facilitate trunk flex, core activation, trunk shift L/R. Neuro Re-Ed: and facilitation of crossing mid-line in supported sitting; and postural control. Completed supported sitting cross body punches to initiate core activation and postural control. Completed 2x10 with extended rest breaks due to UE fatigue. No new concerns at this time.   Objective  Vital signs: 131/84, 98, 82, 18, 100%    Physical Exam  Constitutional:       General: She is not in acute distress.  Eyes:      Extraocular Movements: Extraocular movements intact.   Cardiovascular:      Rate and Rhythm: Normal rate and regular rhythm.   Pulmonary:      Effort: Pulmonary effort is normal.      Breath sounds: Normal breath sounds.   Abdominal:      General: Bowel sounds are normal.      Palpations: Abdomen is soft.   Musculoskeletal:      Cervical back: Neck supple.      Right lower leg: No edema.      Left lower leg: No edema.   Neurological:      Mental Status: She is alert.   Psychiatric:         Mood and Affect: Mood normal.         Behavior: Behavior is cooperative.         Assessment/Plan  Problem List Items Addressed This Visit       Hypertension     Monitor blood pressure  Metoprolol         Weakness     Continue to work in therapy, monitor  abilities           Dementia (Multi)     Monitor for safety  Assist with ADLs.  Donepezil  Depakote  Speech therapy          Medications, treatments, and labs reviewed  Continue medications and treatments as listed in [facility]    Christian Mazariegos MD    1. Hypertension, unspecified type        2. Dementia, unspecified dementia severity, unspecified dementia type, unspecified whether behavioral, psychotic, or mood disturbance or anxiety (Multi)        3. Weakness             Scribe Attestation  By signing my name below, I, Kaiden Dunbaribe   attest that this documentation has been prepared under the direction and in the presence of Christian Mazariegos MD.    Provider Attestation - Scribe documentation  All medical record entries made by the Scribe were at my direction and personally dictated by me. I have reviewed the chart and agree that the record accurately reflects my personal performance of the history, physical exam, discussion and plan.      Electronically Signed By: Christian Mazariegos MD   7/25/24  6:35 PM

## 2024-07-25 NOTE — PROGRESS NOTES
PROGRESS NOTE    Subjective   Chief complaint: Christina Davila is a 83 y.o. female who is an acute skilled patient being seen and evaluated for weakness    HPI:  HPI Patient continues to work in therapy due to weakness. Therapeutic Activities: training in rolling, scooting, bridging to facilitate (I) bed mobility and pressure relief. Training completed on UE reaching to handrails, transitioning one LE over the other to improve efficiency with transfers. Mobility limited by ROM and strength deficits to B UE - inhibiting reaching ability. Completed static balance activities during supported sitting: B UE use to pull up on therapist 2x10 to facilitate trunk flex, core activation, trunk shift L/R. Neuro Re-Ed: and facilitation of crossing mid-line in supported sitting; and postural control. Completed supported sitting cross body punches to initiate core activation and postural control. Completed 2x10 with extended rest breaks due to UE fatigue. No new concerns at this time.   Objective   Vital signs: 131/84, 98, 82, 18, 100%    Physical Exam  Constitutional:       General: She is not in acute distress.  Eyes:      Extraocular Movements: Extraocular movements intact.   Cardiovascular:      Rate and Rhythm: Normal rate and regular rhythm.   Pulmonary:      Effort: Pulmonary effort is normal.      Breath sounds: Normal breath sounds.   Abdominal:      General: Bowel sounds are normal.      Palpations: Abdomen is soft.   Musculoskeletal:      Cervical back: Neck supple.      Right lower leg: No edema.      Left lower leg: No edema.   Neurological:      Mental Status: She is alert.   Psychiatric:         Mood and Affect: Mood normal.         Behavior: Behavior is cooperative.         Assessment/Plan   Problem List Items Addressed This Visit       Hypertension     Monitor blood pressure  Metoprolol         Weakness     Continue to work in therapy, monitor abilities           Dementia (Multi)     Monitor for safety  Assist with  ADLs.  Donepezil  Depakote  Speech therapy          Medications, treatments, and labs reviewed  Continue medications and treatments as listed in [facility]    Christian Mazariegos MD    1. Hypertension, unspecified type        2. Dementia, unspecified dementia severity, unspecified dementia type, unspecified whether behavioral, psychotic, or mood disturbance or anxiety (Multi)        3. Weakness             Scribe Attestation  By signing my name below, I, Shantel Dunbar   attest that this documentation has been prepared under the direction and in the presence of Christian Mazariegos MD.    Provider Attestation - Scribe documentation  All medical record entries made by the Scribe were at my direction and personally dictated by me. I have reviewed the chart and agree that the record accurately reflects my personal performance of the history, physical exam, discussion and plan.

## 2024-07-26 ENCOUNTER — NURSING HOME VISIT (OUTPATIENT)
Dept: POST ACUTE CARE | Facility: EXTERNAL LOCATION | Age: 84
End: 2024-07-26
Payer: COMMERCIAL

## 2024-07-26 DIAGNOSIS — I10 HYPERTENSION, UNSPECIFIED TYPE: ICD-10-CM

## 2024-07-26 DIAGNOSIS — E10.9 TYPE 1 DIABETES MELLITUS WITHOUT COMPLICATION (MULTI): ICD-10-CM

## 2024-07-26 DIAGNOSIS — F03.90 DEMENTIA, UNSPECIFIED DEMENTIA SEVERITY, UNSPECIFIED DEMENTIA TYPE, UNSPECIFIED WHETHER BEHAVIORAL, PSYCHOTIC, OR MOOD DISTURBANCE OR ANXIETY (MULTI): Primary | ICD-10-CM

## 2024-07-26 DIAGNOSIS — R53.1 WEAKNESS: ICD-10-CM

## 2024-07-26 DIAGNOSIS — F32.A DEPRESSION, UNSPECIFIED DEPRESSION TYPE: ICD-10-CM

## 2024-07-26 PROCEDURE — 99308 SBSQ NF CARE LOW MDM 20: CPT | Performed by: NURSE PRACTITIONER

## 2024-07-26 NOTE — LETTER
Patient: Christina Davila  : 1940    Encounter Date: 2024    PROGRESS NOTE    Subjective  Chief complaint: Christina Davila is a 83 y.o. female who is an acute skilled patient being seen and evaluated for weakness    HPI: is in bed. Alert and talkative. Denies any chest pain or tightness. She reports that she worked with therapy this morning. Nursing reports that her appetite has improved.   HPI      Objective  Vital signs: 126/73-77-18-98.7    Physical Exam  Vitals and nursing note reviewed.   Constitutional:       General: She is not in acute distress.     Appearance: She is well-groomed and underweight.   Eyes:      Extraocular Movements: Extraocular movements intact.   Cardiovascular:      Rate and Rhythm: Normal rate and regular rhythm.   Pulmonary:      Effort: Pulmonary effort is normal.      Breath sounds: Normal breath sounds.      Comments: Lungs clear   Abdominal:      General: Bowel sounds are normal.      Palpations: Abdomen is soft.   Musculoskeletal:      Cervical back: Neck supple.      Right lower leg: No edema.      Left lower leg: No edema.   Neurological:      Mental Status: She is alert.   Psychiatric:         Mood and Affect: Mood normal.         Behavior: Behavior is cooperative.         Cognition and Memory: Cognition is impaired. Memory is impaired.      Comments: Cooperative    Talkative          Assessment/Plan  Problem List Items Addressed This Visit       Diabetes mellitus (Multi)     Monitor fasting blood sugar  Glucoscans         Hypertension     Monitor blood pressure  Metoprolol         Weakness     Continue to work in therapy, monitor abilities  Therapy reports that she is more cooperative in her room for all exercises            Depression     Monitor mood and behaviors         Dementia (Multi) - Primary     Monitor for safety  Assist with ADLs.  Donepezil  Depakote  Encourage all abilities           Medications, treatments, and labs reviewed  Continue medications and  treatments as listed in EMR    MARY Munoz      Electronically Signed By: MARY Munoz   7/26/24  8:44 PM

## 2024-07-27 NOTE — ASSESSMENT & PLAN NOTE
Continue to work in therapy, monitor abilities  Therapy reports that she is more cooperative in her room for all exercises

## 2024-07-27 NOTE — PROGRESS NOTES
PROGRESS NOTE    Subjective   Chief complaint: Christina Davila is a 83 y.o. female who is an acute skilled patient being seen and evaluated for weakness    HPI: is in bed. Alert and talkative. Denies any chest pain or tightness. She reports that she worked with therapy this morning. Nursing reports that her appetite has improved.   HPI      Objective   Vital signs: 126/73-77-18-98.7    Physical Exam  Vitals and nursing note reviewed.   Constitutional:       General: She is not in acute distress.     Appearance: She is well-groomed and underweight.   Eyes:      Extraocular Movements: Extraocular movements intact.   Cardiovascular:      Rate and Rhythm: Normal rate and regular rhythm.   Pulmonary:      Effort: Pulmonary effort is normal.      Breath sounds: Normal breath sounds.      Comments: Lungs clear   Abdominal:      General: Bowel sounds are normal.      Palpations: Abdomen is soft.   Musculoskeletal:      Cervical back: Neck supple.      Right lower leg: No edema.      Left lower leg: No edema.   Neurological:      Mental Status: She is alert.   Psychiatric:         Mood and Affect: Mood normal.         Behavior: Behavior is cooperative.         Cognition and Memory: Cognition is impaired. Memory is impaired.      Comments: Cooperative    Talkative          Assessment/Plan   Problem List Items Addressed This Visit       Diabetes mellitus (Multi)     Monitor fasting blood sugar  Glucoscans         Hypertension     Monitor blood pressure  Metoprolol         Weakness     Continue to work in therapy, monitor abilities  Therapy reports that she is more cooperative in her room for all exercises            Depression     Monitor mood and behaviors         Dementia (Multi) - Primary     Monitor for safety  Assist with ADLs.  Donepezil  Depakote  Encourage all abilities           Medications, treatments, and labs reviewed  Continue medications and treatments as listed in EMR    Stella Duncan, APRN-CNP

## 2024-07-29 ENCOUNTER — NURSING HOME VISIT (OUTPATIENT)
Dept: POST ACUTE CARE | Facility: EXTERNAL LOCATION | Age: 84
End: 2024-07-29
Payer: COMMERCIAL

## 2024-07-29 DIAGNOSIS — F03.90 DEMENTIA, UNSPECIFIED DEMENTIA SEVERITY, UNSPECIFIED DEMENTIA TYPE, UNSPECIFIED WHETHER BEHAVIORAL, PSYCHOTIC, OR MOOD DISTURBANCE OR ANXIETY (MULTI): ICD-10-CM

## 2024-07-29 DIAGNOSIS — I10 HYPERTENSION, UNSPECIFIED TYPE: ICD-10-CM

## 2024-07-29 DIAGNOSIS — F32.A DEPRESSION, UNSPECIFIED DEPRESSION TYPE: ICD-10-CM

## 2024-07-29 DIAGNOSIS — R53.1 WEAKNESS: Primary | ICD-10-CM

## 2024-07-29 PROCEDURE — 99308 SBSQ NF CARE LOW MDM 20: CPT | Performed by: INTERNAL MEDICINE

## 2024-07-29 NOTE — PROGRESS NOTES
PROGRESS NOTE    Subjective   Chief complaint: Christina Davila is a 83 y.o. female who is an acute skilled patient being seen and evaluated for weakness    HPI:  Patient has been working in therapy to improve strength, endurance, and ADLs.  Patient continues to work toward goals. instruction in hygiene/grooming techniques, energy conservation during ADLs, analysis of movement during ADLs and safety training during transitional movements. Pt completed grroming/hygiene task with MIN/MOD/A requiring rest breaks around 3 min intervals secondary to fatigue. Patient completing multiple therapeutic exercises to increase strength, mobility and flexibility.  ST addressing speech skills.  No new concerns today.  Denies n/v/f/c pain.        Objective   Vital signs: 131/84,82,96%    Physical Exam  Constitutional:       General: She is not in acute distress.  Eyes:      Extraocular Movements: Extraocular movements intact.   Cardiovascular:      Rate and Rhythm: Normal rate and regular rhythm.   Pulmonary:      Effort: Pulmonary effort is normal.      Breath sounds: Normal breath sounds.   Abdominal:      General: Bowel sounds are normal.      Palpations: Abdomen is soft.   Musculoskeletal:      Cervical back: Neck supple.      Right lower leg: No edema.      Left lower leg: No edema.   Neurological:      Mental Status: She is alert.   Psychiatric:         Mood and Affect: Mood normal.         Behavior: Behavior is cooperative.         Assessment/Plan   Problem List Items Addressed This Visit       Hypertension     Monitor blood pressure  Metoprolol  Bp at goal          Weakness - Primary     Continue to work in therapy, monitor abilities             Depression     Monitor mood and behaviors         Dementia (Multi)     Monitor for safety  Assist with ADLs.  Donepezil  Depakote  Encourage all abilities           Medications, treatments, and labs reviewed  Continue medications and treatments as listed in EMR    Scribe Attestation  I,  Kaiden Valentinibe   attest that this documentation has been prepared under the direction and in the presence of Christian Mazariegos MD.     Provider Attestation - Scribe documentation  All medical record entries made by the Scribe were at my direction and personally dictated by me. I have reviewed the chart and agree that the record accurately reflects my personal performance of the history, physical exam, discussion and plan.   Christian Mazariegos MD

## 2024-07-29 NOTE — LETTER
Patient: Christina Davila  : 1940    Encounter Date: 2024    PROGRESS NOTE    Subjective  Chief complaint: Christina Davila is a 83 y.o. female who is an acute skilled patient being seen and evaluated for weakness    HPI:  Patient has been working in therapy to improve strength, endurance, and ADLs.  Patient continues to work toward goals. instruction in hygiene/grooming techniques, energy conservation during ADLs, analysis of movement during ADLs and safety training during transitional movements. Pt completed grroming/hygiene task with MIN/MOD/A requiring rest breaks around 3 min intervals secondary to fatigue. Patient completing multiple therapeutic exercises to increase strength, mobility and flexibility.  ST addressing speech skills.  No new concerns today.  Denies n/v/f/c pain.        Objective  Vital signs: 131/84,82,96%    Physical Exam  Constitutional:       General: She is not in acute distress.  Eyes:      Extraocular Movements: Extraocular movements intact.   Cardiovascular:      Rate and Rhythm: Normal rate and regular rhythm.   Pulmonary:      Effort: Pulmonary effort is normal.      Breath sounds: Normal breath sounds.   Abdominal:      General: Bowel sounds are normal.      Palpations: Abdomen is soft.   Musculoskeletal:      Cervical back: Neck supple.      Right lower leg: No edema.      Left lower leg: No edema.   Neurological:      Mental Status: She is alert.   Psychiatric:         Mood and Affect: Mood normal.         Behavior: Behavior is cooperative.         Assessment/Plan  Problem List Items Addressed This Visit       Hypertension     Monitor blood pressure  Metoprolol  Bp at goal          Weakness - Primary     Continue to work in therapy, monitor abilities             Depression     Monitor mood and behaviors         Dementia (Multi)     Monitor for safety  Assist with ADLs.  Donepezil  Depakote  Encourage all abilities           Medications, treatments, and labs reviewed  Continue  medications and treatments as listed in EMR    Scribe Attestation  PIERRE, Shantel Valentin   attest that this documentation has been prepared under the direction and in the presence of Christian Mazariegos MD.     Provider Attestation - Scribe documentation  All medical record entries made by the Scribe were at my direction and personally dictated by me. I have reviewed the chart and agree that the record accurately reflects my personal performance of the history, physical exam, discussion and plan.   Christian Mazariegos MD      Electronically Signed By: Christian Mazariegos MD   7/29/24  7:08 PM

## 2024-07-30 ENCOUNTER — NURSING HOME VISIT (OUTPATIENT)
Dept: POST ACUTE CARE | Facility: EXTERNAL LOCATION | Age: 84
End: 2024-07-30
Payer: COMMERCIAL

## 2024-07-30 DIAGNOSIS — F03.90 DEMENTIA, UNSPECIFIED DEMENTIA SEVERITY, UNSPECIFIED DEMENTIA TYPE, UNSPECIFIED WHETHER BEHAVIORAL, PSYCHOTIC, OR MOOD DISTURBANCE OR ANXIETY (MULTI): Primary | ICD-10-CM

## 2024-07-30 DIAGNOSIS — R53.1 WEAKNESS: ICD-10-CM

## 2024-07-30 DIAGNOSIS — I10 HYPERTENSION, UNSPECIFIED TYPE: ICD-10-CM

## 2024-07-30 DIAGNOSIS — F32.A DEPRESSION, UNSPECIFIED DEPRESSION TYPE: ICD-10-CM

## 2024-07-30 DIAGNOSIS — E78.5 HYPERLIPIDEMIA, UNSPECIFIED HYPERLIPIDEMIA TYPE: ICD-10-CM

## 2024-07-30 DIAGNOSIS — R33.9 URINARY RETENTION: ICD-10-CM

## 2024-07-30 DIAGNOSIS — E10.9 TYPE 1 DIABETES MELLITUS WITHOUT COMPLICATION (MULTI): ICD-10-CM

## 2024-07-30 PROCEDURE — 99309 SBSQ NF CARE MODERATE MDM 30: CPT | Performed by: NURSE PRACTITIONER

## 2024-07-30 NOTE — LETTER
Patient: Chrisitna Davila  : 1940    Encounter Date: 2024    PROGRESS NOTE    Subjective  Chief complaint: Christina Davila is a 83 y.o. female who is an acute skilled patient being seen and evaluated for weakness    HPI: remains in therapy, the therapist reports that she is cooperative with exercise programs.  She is in bed. Appears comfortable. Actively responds to her name, responds verbally.   Denies any discomfort. Nursing reports that her appetite has improved.  HPI      Objective  Vital signs: 119/76-82-18-98.4     Physical Exam  Vitals and nursing note reviewed.   Constitutional:       General: She is not in acute distress.     Appearance: She is well-groomed and underweight.   Eyes:      Extraocular Movements: Extraocular movements intact.   Cardiovascular:      Rate and Rhythm: Normal rate and regular rhythm.   Pulmonary:      Effort: Pulmonary effort is normal.      Breath sounds: Normal breath sounds.      Comments: Lungs clear   Abdominal:      General: Bowel sounds are normal.      Palpations: Abdomen is soft.   Musculoskeletal:      Cervical back: Neck supple.      Right lower leg: No edema.      Left lower leg: No edema.   Neurological:      Mental Status: She is alert.   Psychiatric:         Mood and Affect: Mood normal.         Behavior: Behavior is cooperative.         Cognition and Memory: Cognition is impaired. Memory is impaired.      Comments: Cooperative    Talkative          Assessment/Plan  Problem List Items Addressed This Visit       Diabetes mellitus (Multi)     Monitor fasting blood sugar  Glucoscans         Hyperlipidemia     Atorvastatin   Monitor lipids and LFTs         Hypertension     Monitor blood pressure  Metoprolol  Bp at goal          Weakness     Continue to work in therapy towards goals         Depression     Monitor mood and behaviors         Dementia (Multi) - Primary     Monitor for safety  Assist with ADLs.  Donepezil  Depakote  Encourage all abilities           Urinary retention     Rodriguez  Bladder not distended  Encourage fluids           Medications, treatments, and labs reviewed  Continue medications and treatments as listed in EMR    MARY Munoz      Electronically Signed By: MARY Munoz   8/3/24 11:10 PM

## 2024-07-31 ENCOUNTER — NURSING HOME VISIT (OUTPATIENT)
Dept: POST ACUTE CARE | Facility: EXTERNAL LOCATION | Age: 84
End: 2024-07-31
Payer: COMMERCIAL

## 2024-07-31 DIAGNOSIS — E10.9 TYPE 1 DIABETES MELLITUS WITHOUT COMPLICATION (MULTI): ICD-10-CM

## 2024-07-31 DIAGNOSIS — F03.90 DEMENTIA, UNSPECIFIED DEMENTIA SEVERITY, UNSPECIFIED DEMENTIA TYPE, UNSPECIFIED WHETHER BEHAVIORAL, PSYCHOTIC, OR MOOD DISTURBANCE OR ANXIETY (MULTI): Primary | ICD-10-CM

## 2024-07-31 DIAGNOSIS — R53.1 WEAKNESS: ICD-10-CM

## 2024-07-31 DIAGNOSIS — I10 HYPERTENSION, UNSPECIFIED TYPE: ICD-10-CM

## 2024-07-31 PROCEDURE — 99308 SBSQ NF CARE LOW MDM 20: CPT | Performed by: INTERNAL MEDICINE

## 2024-07-31 NOTE — LETTER
Patient: Christina Davila  : 1940    Encounter Date: 2024    PROGRESS NOTE    Subjective  Chief complaint: Christina Davila is a 83 y.o. female who is an acute skilled patient being seen and evaluated for weakness    HPI:  HPI  Therapy is currently working with patient focusing on training and rolling scooting and bridging to facilitate independent bed mobility and pressure-relief.  Patient is also working on static balance activities during supported sitting with use of bilateral upper extremities to used to pull up on therapist x 10 reps.  Patient also performs seated cross body punches to initiate core activation and postural control, completed 2 x 10 reps with extended rest breaks.  Patient seen and examined at bedside, appears to be in no acute distress.    Objective  Vital signs: 124/76, 97.8, 76, 18, 97%    Physical Exam  Constitutional:       General: She is not in acute distress.  Eyes:      Extraocular Movements: Extraocular movements intact.   Cardiovascular:      Rate and Rhythm: Normal rate and regular rhythm.   Pulmonary:      Effort: Pulmonary effort is normal.      Breath sounds: Normal breath sounds.   Abdominal:      General: Bowel sounds are normal.      Palpations: Abdomen is soft.   Musculoskeletal:      Cervical back: Neck supple.      Right lower leg: No edema.      Left lower leg: No edema.   Neurological:      Mental Status: She is alert.   Psychiatric:         Mood and Affect: Mood normal.         Behavior: Behavior is cooperative.         Assessment/Plan  Problem List Items Addressed This Visit       Diabetes mellitus (Multi)     Monitor fasting blood sugar  Glucoscans         Hypertension     Monitor blood pressure  Metoprolol  Bp at goal          Weakness     Continue to work in therapy towards goals         Dementia (Multi) - Primary     Monitor for safety  Assist with ADLs.  Donepezil  Depakote  Encourage all abilities           Medications, treatments, and labs reviewed  Continue  medications and treatments as listed in EMR      Scribe Attestation  I, Shantel Chen   attest that this documentation has been prepared under the direction and in the presence of Christian Mazariegos MD    Provider Attestation - Scribe documentation  All medical record entries made by the Scribe were at my direction and personally dictated by me. I have reviewed the chart and agree that the record accurately reflects my personal performance of the history, physical exam, discussion and plan.   Christian Mazariegos MD        Electronically Signed By: Christian Mazariegos MD   7/31/24  2:44 PM

## 2024-07-31 NOTE — PROGRESS NOTES
PROGRESS NOTE    Subjective   Chief complaint: Christina Davila is a 83 y.o. female who is an acute skilled patient being seen and evaluated for weakness    HPI:  HPI  Therapy is currently working with patient focusing on training and rolling scooting and bridging to facilitate independent bed mobility and pressure-relief.  Patient is also working on static balance activities during supported sitting with use of bilateral upper extremities to used to pull up on therapist x 10 reps.  Patient also performs seated cross body punches to initiate core activation and postural control, completed 2 x 10 reps with extended rest breaks.  Patient seen and examined at bedside, appears to be in no acute distress.    Objective   Vital signs: 124/76, 97.8, 76, 18, 97%    Physical Exam  Constitutional:       General: She is not in acute distress.  Eyes:      Extraocular Movements: Extraocular movements intact.   Cardiovascular:      Rate and Rhythm: Normal rate and regular rhythm.   Pulmonary:      Effort: Pulmonary effort is normal.      Breath sounds: Normal breath sounds.   Abdominal:      General: Bowel sounds are normal.      Palpations: Abdomen is soft.   Musculoskeletal:      Cervical back: Neck supple.      Right lower leg: No edema.      Left lower leg: No edema.   Neurological:      Mental Status: She is alert.   Psychiatric:         Mood and Affect: Mood normal.         Behavior: Behavior is cooperative.         Assessment/Plan   Problem List Items Addressed This Visit       Diabetes mellitus (Multi)     Monitor fasting blood sugar  Glucoscans         Hypertension     Monitor blood pressure  Metoprolol  Bp at goal          Weakness     Continue to work in therapy towards goals         Dementia (Multi) - Primary     Monitor for safety  Assist with ADLs.  Donepezil  Depakote  Encourage all abilities           Medications, treatments, and labs reviewed  Continue medications and treatments as listed in EMR      Scribe  Attestation  I, Shantel Chen   attest that this documentation has been prepared under the direction and in the presence of Christian Mazariegos MD    Provider Attestation - Scribe documentation  All medical record entries made by the Scribe were at my direction and personally dictated by me. I have reviewed the chart and agree that the record accurately reflects my personal performance of the history, physical exam, discussion and plan.   Christian Mazariegos MD

## 2024-08-04 NOTE — PROGRESS NOTES
PROGRESS NOTE    Subjective   Chief complaint: Christina Davila is a 83 y.o. female who is an acute skilled patient being seen and evaluated for weakness    HPI: remains in therapy, the therapist reports that she is cooperative with exercise programs.  She is in bed. Appears comfortable. Actively responds to her name, responds verbally.   Denies any discomfort. Nursing reports that her appetite has improved.  HPI      Objective   Vital signs: 119/76-82-18-98.4     Physical Exam  Vitals and nursing note reviewed.   Constitutional:       General: She is not in acute distress.     Appearance: She is well-groomed and underweight.   Eyes:      Extraocular Movements: Extraocular movements intact.   Cardiovascular:      Rate and Rhythm: Normal rate and regular rhythm.   Pulmonary:      Effort: Pulmonary effort is normal.      Breath sounds: Normal breath sounds.      Comments: Lungs clear   Abdominal:      General: Bowel sounds are normal.      Palpations: Abdomen is soft.   Musculoskeletal:      Cervical back: Neck supple.      Right lower leg: No edema.      Left lower leg: No edema.   Neurological:      Mental Status: She is alert.   Psychiatric:         Mood and Affect: Mood normal.         Behavior: Behavior is cooperative.         Cognition and Memory: Cognition is impaired. Memory is impaired.      Comments: Cooperative    Talkative          Assessment/Plan   Problem List Items Addressed This Visit       Diabetes mellitus (Multi)     Monitor fasting blood sugar  Glucoscans         Hyperlipidemia     Atorvastatin   Monitor lipids and LFTs         Hypertension     Monitor blood pressure  Metoprolol  Bp at goal          Weakness     Continue to work in therapy towards goals         Depression     Monitor mood and behaviors         Dementia (Multi) - Primary     Monitor for safety  Assist with ADLs.  Donepezil  Depakote  Encourage all abilities          Urinary retention     Rodriguez  Bladder not distended  Encourage fluids            Medications, treatments, and labs reviewed  Continue medications and treatments as listed in EMR    Stella Duncan, APRN-CNP

## 2024-08-05 ENCOUNTER — NURSING HOME VISIT (OUTPATIENT)
Dept: POST ACUTE CARE | Facility: EXTERNAL LOCATION | Age: 84
End: 2024-08-05
Payer: COMMERCIAL

## 2024-08-05 DIAGNOSIS — I10 HYPERTENSION, UNSPECIFIED TYPE: ICD-10-CM

## 2024-08-05 DIAGNOSIS — F32.A DEPRESSION, UNSPECIFIED DEPRESSION TYPE: ICD-10-CM

## 2024-08-05 DIAGNOSIS — R53.1 WEAKNESS: Primary | ICD-10-CM

## 2024-08-05 DIAGNOSIS — F03.90 DEMENTIA, UNSPECIFIED DEMENTIA SEVERITY, UNSPECIFIED DEMENTIA TYPE, UNSPECIFIED WHETHER BEHAVIORAL, PSYCHOTIC, OR MOOD DISTURBANCE OR ANXIETY (MULTI): ICD-10-CM

## 2024-08-05 PROCEDURE — 99308 SBSQ NF CARE LOW MDM 20: CPT | Performed by: INTERNAL MEDICINE

## 2024-08-05 NOTE — PROGRESS NOTES
PROGRESS NOTE    Subjective   Chief complaint: Christina Davila is a 83 y.o. female who is an acute skilled patient being seen and evaluated for weakness    HPI:  Therapy has been working with the patient to improve strength and endurance with ADLs, transfers, and mobility.  Patient continues to work toward goals. Attempted WC mobility, req max verbal and tactile assist for propulsion, edu regarding locking and unlocking WC.  Staff discussion regarding mental barriers, cog decline Patient is stable and has no new complaints. ST addressing speech skills. patient with need for consistent verbal support notable increase in STM deficits evidenced by patient's continued need for re-direction. Nursing staff voices no new concerns today.      Objective   Vital signs: 122/78,74,95%    Physical Exam  Constitutional:       General: She is not in acute distress.  Eyes:      Extraocular Movements: Extraocular movements intact.   Cardiovascular:      Rate and Rhythm: Normal rate and regular rhythm.   Pulmonary:      Effort: Pulmonary effort is normal.      Breath sounds: Normal breath sounds.   Abdominal:      General: Bowel sounds are normal.      Palpations: Abdomen is soft.   Musculoskeletal:      Cervical back: Neck supple.      Right lower leg: No edema.      Left lower leg: No edema.   Neurological:      Mental Status: She is alert.   Psychiatric:         Mood and Affect: Mood normal.         Behavior: Behavior is cooperative.         Assessment/Plan   Problem List Items Addressed This Visit       Hypertension     Monitor blood pressure  Metoprolol  Bp at goal          Weakness - Primary     Continue to work in therapy towards goals         Depression     Monitor mood and behaviors         Dementia (Multi)     Monitor for safety  Assist with ADLs.  Donepezil  Depakote  Encourage all abilities           Medications, treatments, and labs reviewed  Continue medications and treatments as listed in EMR    Scribe Attestation  I,  Kaiden Valentinibe   attest that this documentation has been prepared under the direction and in the presence of Christian Mazariegos MD.     Provider Attestation - Scribe documentation  All medical record entries made by the Scribe were at my direction and personally dictated by me. I have reviewed the chart and agree that the record accurately reflects my personal performance of the history, physical exam, discussion and plan.   Christian Mazariegos MD

## 2024-08-05 NOTE — LETTER
Patient: Christina Davila  : 1940    Encounter Date: 2024    PROGRESS NOTE    Subjective  Chief complaint: Christina Davila is a 83 y.o. female who is an acute skilled patient being seen and evaluated for weakness    HPI:  Therapy has been working with the patient to improve strength and endurance with ADLs, transfers, and mobility.  Patient continues to work toward goals. Attempted WC mobility, req max verbal and tactile assist for propulsion, edu regarding locking and unlocking WC.  Staff discussion regarding mental barriers, cog decline Patient is stable and has no new complaints. ST addressing speech skills. patient with need for consistent verbal support notable increase in STM deficits evidenced by patient's continued need for re-direction. Nursing staff voices no new concerns today.      Objective  Vital signs: 122/78,74,95%    Physical Exam  Constitutional:       General: She is not in acute distress.  Eyes:      Extraocular Movements: Extraocular movements intact.   Cardiovascular:      Rate and Rhythm: Normal rate and regular rhythm.   Pulmonary:      Effort: Pulmonary effort is normal.      Breath sounds: Normal breath sounds.   Abdominal:      General: Bowel sounds are normal.      Palpations: Abdomen is soft.   Musculoskeletal:      Cervical back: Neck supple.      Right lower leg: No edema.      Left lower leg: No edema.   Neurological:      Mental Status: She is alert.   Psychiatric:         Mood and Affect: Mood normal.         Behavior: Behavior is cooperative.         Assessment/Plan  Problem List Items Addressed This Visit       Hypertension     Monitor blood pressure  Metoprolol  Bp at goal          Weakness - Primary     Continue to work in therapy towards goals         Depression     Monitor mood and behaviors         Dementia (Multi)     Monitor for safety  Assist with ADLs.  Donepezil  Depakote  Encourage all abilities           Medications, treatments, and labs reviewed  Continue  medications and treatments as listed in EMR    Scribe Attestation  Chiquita JAY Scribe   attest that this documentation has been prepared under the direction and in the presence of Christian Mazariegos MD.     Provider Attestation - Scribe documentation  All medical record entries made by the Scribe were at my direction and personally dictated by me. I have reviewed the chart and agree that the record accurately reflects my personal performance of the history, physical exam, discussion and plan.   Christian Mazariegos MD      Electronically Signed By: Christian Mazariegos MD   8/5/24  2:51 PM

## 2024-08-06 ENCOUNTER — NURSING HOME VISIT (OUTPATIENT)
Dept: POST ACUTE CARE | Facility: EXTERNAL LOCATION | Age: 84
End: 2024-08-06
Payer: COMMERCIAL

## 2024-08-06 DIAGNOSIS — F32.A DEPRESSION, UNSPECIFIED DEPRESSION TYPE: ICD-10-CM

## 2024-08-06 DIAGNOSIS — R53.1 WEAKNESS: ICD-10-CM

## 2024-08-06 DIAGNOSIS — I10 HYPERTENSION, UNSPECIFIED TYPE: ICD-10-CM

## 2024-08-06 DIAGNOSIS — F03.90 DEMENTIA, UNSPECIFIED DEMENTIA SEVERITY, UNSPECIFIED DEMENTIA TYPE, UNSPECIFIED WHETHER BEHAVIORAL, PSYCHOTIC, OR MOOD DISTURBANCE OR ANXIETY (MULTI): Primary | ICD-10-CM

## 2024-08-06 DIAGNOSIS — E10.9 TYPE 1 DIABETES MELLITUS WITHOUT COMPLICATION (MULTI): ICD-10-CM

## 2024-08-06 PROCEDURE — 99309 SBSQ NF CARE MODERATE MDM 30: CPT | Performed by: NURSE PRACTITIONER

## 2024-08-06 NOTE — LETTER
Patient: Christina Davila  : 1940    Encounter Date: 2024    PROGRESS NOTE    Subjective  Chief complaint: Christina Davila is a 83 y.o. female who is an acute skilled patient being seen and evaluated for weakness    HPI: pleasantly confused. Is oriented to her name only. Disoriented to day and season. Transferred into chair by staff. Denies any pain or discomfort, Follows only limited instructions.   SW reports that she is scheduled for discharge home this week with hospice care.   Nursing reports that her appetite remains inconsistent. Requires to be fed by staff.   HPI      Objective  Vital signs: 112/65-63-18-98.5     Physical Exam  Vitals and nursing note reviewed.   Constitutional:       General: She is not in acute distress.     Appearance: She is well-groomed and underweight.   Eyes:      Extraocular Movements: Extraocular movements intact.   Cardiovascular:      Rate and Rhythm: Normal rate and regular rhythm.   Pulmonary:      Effort: Pulmonary effort is normal.      Breath sounds: Normal breath sounds.      Comments: Lungs clear   Abdominal:      General: Bowel sounds are normal.      Palpations: Abdomen is soft.   Musculoskeletal:      Cervical back: Neck supple.      Right lower leg: No edema.      Left lower leg: No edema.   Neurological:      Mental Status: She is alert.   Psychiatric:         Mood and Affect: Mood normal.         Behavior: Behavior is cooperative.         Cognition and Memory: Cognition is impaired. Memory is impaired.      Comments: Cooperative    Talkative          Assessment/Plan  Problem List Items Addressed This Visit       Diabetes mellitus (Multi)     Monitor fasting blood sugar  Glucoscans         Hypertension     Monitor blood pressure  Metoprolol  Bp at goal          Weakness     Has completed therapy sessions   Remains totally dependent on staff for all care    SW to coordinate discharge - plan to be followed by hospice care in the home.          Depression     Monitor  mood and behaviors         Dementia (Multi) - Primary     Monitor for safety  Assist with ADLs.  Donepezil  Depakote  Encourage all abilities           Medications, treatments, and labs reviewed  Continue medications and treatments as listed in EMR    MARY Munoz      Electronically Signed By: MARY Munoz   8/9/24 11:17 PM

## 2024-08-07 NOTE — PROGRESS NOTES
PROGRESS NOTE    Subjective   Chief complaint: Christina Davila is a 83 y.o. female who is an acute skilled patient being seen and evaluated for weakness    HPI: pleasantly confused. Is oriented to her name only. Disoriented to day and season. Transferred into chair by staff. Denies any pain or discomfort, Follows only limited instructions.   SW reports that she is scheduled for discharge home this week with hospice care.   Nursing reports that her appetite remains inconsistent. Requires to be fed by staff.   HPI      Objective   Vital signs: 112/65-63-18-98.5     Physical Exam  Vitals and nursing note reviewed.   Constitutional:       General: She is not in acute distress.     Appearance: She is well-groomed and underweight.   Eyes:      Extraocular Movements: Extraocular movements intact.   Cardiovascular:      Rate and Rhythm: Normal rate and regular rhythm.   Pulmonary:      Effort: Pulmonary effort is normal.      Breath sounds: Normal breath sounds.      Comments: Lungs clear   Abdominal:      General: Bowel sounds are normal.      Palpations: Abdomen is soft.   Musculoskeletal:      Cervical back: Neck supple.      Right lower leg: No edema.      Left lower leg: No edema.   Neurological:      Mental Status: She is alert.   Psychiatric:         Mood and Affect: Mood normal.         Behavior: Behavior is cooperative.         Cognition and Memory: Cognition is impaired. Memory is impaired.      Comments: Cooperative    Talkative          Assessment/Plan   Problem List Items Addressed This Visit       Diabetes mellitus (Multi)     Monitor fasting blood sugar  Glucoscans         Hypertension     Monitor blood pressure  Metoprolol  Bp at goal          Weakness     Has completed therapy sessions   Remains totally dependent on staff for all care    SW to coordinate discharge - plan to be followed by hospice care in the home.          Depression     Monitor mood and behaviors         Dementia (Multi) - Primary     Monitor  for safety  Assist with ADLs.  Donepezil  Depakote  Encourage all abilities           Medications, treatments, and labs reviewed  Continue medications and treatments as listed in EMR    Stella Duncan, APRN-CNP

## 2024-08-10 NOTE — ASSESSMENT & PLAN NOTE
Has completed therapy sessions   Remains totally dependent on staff for all care    SW to coordinate discharge - plan to be followed by hospice care in the home.

## 2024-10-27 ENCOUNTER — HOSPITAL ENCOUNTER (INPATIENT)
Age: 84
LOS: 8 days | Discharge: NURSING, REHAB OR LONG-TERM CARE | DRG: 194 | End: 2024-11-04
Attending: INTERNAL MEDICINE | Admitting: INTERNAL MEDICINE
Payer: COMMERCIAL

## 2024-10-27 ENCOUNTER — APPOINTMENT (OUTPATIENT)
Dept: GENERAL RADIOLOGY | Age: 84
DRG: 194 | End: 2024-10-27
Attending: INTERNAL MEDICINE
Payer: COMMERCIAL

## 2024-10-27 PROBLEM — J18.9 PNEUMONIA DUE TO INFECTIOUS ORGANISM: Status: ACTIVE | Noted: 2024-10-27

## 2024-10-27 LAB
ALBUMIN SERPL-MCNC: 2.5 G/DL (ref 3.5–4.6)
ANION GAP SERPL CALCULATED.3IONS-SCNC: 7 MEQ/L (ref 9–15)
APTT PPP: 29.2 SEC (ref 24.4–36.8)
BASOPHILS # BLD: 0 K/UL (ref 0–0.2)
BASOPHILS NFR BLD: 0.7 %
BUN SERPL-MCNC: 27 MG/DL (ref 8–23)
CALCIUM SERPL-MCNC: 8.7 MG/DL (ref 8.5–9.9)
CHLORIDE SERPL-SCNC: 104 MEQ/L (ref 95–107)
CO2 SERPL-SCNC: 28 MEQ/L (ref 20–31)
CREAT SERPL-MCNC: 0.78 MG/DL (ref 0.5–0.9)
CRP SERPL HS-MCNC: 25.6 MG/L (ref 0–5)
EOSINOPHIL # BLD: 0.5 K/UL (ref 0–0.7)
EOSINOPHIL NFR BLD: 7.8 %
ERYTHROCYTE [DISTWIDTH] IN BLOOD BY AUTOMATED COUNT: 14.6 % (ref 11.5–14.5)
GLUCOSE BLD-MCNC: 153 MG/DL (ref 70–99)
GLUCOSE BLD-MCNC: 83 MG/DL (ref 70–99)
GLUCOSE BLD-MCNC: 98 MG/DL (ref 70–99)
GLUCOSE SERPL-MCNC: 90 MG/DL (ref 70–99)
HCT VFR BLD AUTO: 33.7 % (ref 37–47)
HGB BLD-MCNC: 10.5 G/DL (ref 12–16)
INR PPP: 1
LYMPHOCYTES # BLD: 1.4 K/UL (ref 1–4.8)
LYMPHOCYTES NFR BLD: 24.4 %
MAGNESIUM SERPL-MCNC: 2.2 MG/DL (ref 1.7–2.4)
MCH RBC QN AUTO: 28.8 PG (ref 27–31.3)
MCHC RBC AUTO-ENTMCNC: 31.2 % (ref 33–37)
MCV RBC AUTO: 92.3 FL (ref 79.4–94.8)
MONOCYTES # BLD: 0.4 K/UL (ref 0.2–0.8)
MONOCYTES NFR BLD: 7 %
NEUTROPHILS # BLD: 3.5 K/UL (ref 1.4–6.5)
NEUTS SEG NFR BLD: 59.8 %
PERFORMED ON: ABNORMAL
PERFORMED ON: NORMAL
PERFORMED ON: NORMAL
PHOSPHATE SERPL-MCNC: 3.8 MG/DL (ref 2.3–4.8)
PLATELET # BLD AUTO: 322 K/UL (ref 130–400)
POTASSIUM SERPL-SCNC: 4.6 MEQ/L (ref 3.4–4.9)
PROCALCITONIN SERPL IA-MCNC: 0.31 NG/ML (ref 0–0.15)
PROTHROMBIN TIME: 13.2 SEC (ref 12.3–14.9)
RBC # BLD AUTO: 3.65 M/UL (ref 4.2–5.4)
SODIUM SERPL-SCNC: 139 MEQ/L (ref 135–144)
WBC # BLD AUTO: 5.9 K/UL (ref 4.8–10.8)

## 2024-10-27 PROCEDURE — 80069 RENAL FUNCTION PANEL: CPT

## 2024-10-27 PROCEDURE — 36415 COLL VENOUS BLD VENIPUNCTURE: CPT

## 2024-10-27 PROCEDURE — 1210000000 HC MED SURG R&B

## 2024-10-27 PROCEDURE — 6360000002 HC RX W HCPCS: Performed by: INTERNAL MEDICINE

## 2024-10-27 PROCEDURE — 71045 X-RAY EXAM CHEST 1 VIEW: CPT

## 2024-10-27 PROCEDURE — 84145 PROCALCITONIN (PCT): CPT

## 2024-10-27 PROCEDURE — 85025 COMPLETE CBC W/AUTO DIFF WBC: CPT

## 2024-10-27 PROCEDURE — 6370000000 HC RX 637 (ALT 250 FOR IP): Performed by: INTERNAL MEDICINE

## 2024-10-27 PROCEDURE — 85730 THROMBOPLASTIN TIME PARTIAL: CPT

## 2024-10-27 PROCEDURE — 2580000003 HC RX 258: Performed by: INTERNAL MEDICINE

## 2024-10-27 PROCEDURE — 83735 ASSAY OF MAGNESIUM: CPT

## 2024-10-27 PROCEDURE — 86140 C-REACTIVE PROTEIN: CPT

## 2024-10-27 PROCEDURE — 85610 PROTHROMBIN TIME: CPT

## 2024-10-27 RX ORDER — HALOPERIDOL 5 MG/ML
2 INJECTION INTRAMUSCULAR EVERY 6 HOURS PRN
Status: DISCONTINUED | OUTPATIENT
Start: 2024-10-27 | End: 2024-11-04 | Stop reason: HOSPADM

## 2024-10-27 RX ORDER — SODIUM CHLORIDE 0.9 % (FLUSH) 0.9 %
5-40 SYRINGE (ML) INJECTION EVERY 12 HOURS SCHEDULED
Status: DISCONTINUED | OUTPATIENT
Start: 2024-10-27 | End: 2024-11-04 | Stop reason: HOSPADM

## 2024-10-27 RX ORDER — SODIUM CHLORIDE 9 MG/ML
INJECTION, SOLUTION INTRAVENOUS CONTINUOUS
Status: DISCONTINUED | OUTPATIENT
Start: 2024-10-27 | End: 2024-10-28

## 2024-10-27 RX ORDER — POTASSIUM CHLORIDE 1500 MG/1
40 TABLET, EXTENDED RELEASE ORAL PRN
Status: ACTIVE | OUTPATIENT
Start: 2024-10-27 | End: 2024-10-30

## 2024-10-27 RX ORDER — ONDANSETRON 2 MG/ML
4 INJECTION INTRAMUSCULAR; INTRAVENOUS EVERY 6 HOURS PRN
Status: DISCONTINUED | OUTPATIENT
Start: 2024-10-27 | End: 2024-11-04 | Stop reason: HOSPADM

## 2024-10-27 RX ORDER — QUETIAPINE FUMARATE 50 MG/1
50 TABLET, FILM COATED ORAL NIGHTLY
Status: DISCONTINUED | OUTPATIENT
Start: 2024-10-27 | End: 2024-11-04 | Stop reason: HOSPADM

## 2024-10-27 RX ORDER — POLYETHYLENE GLYCOL 3350 17 G/17G
17 POWDER, FOR SOLUTION ORAL DAILY PRN
Status: DISCONTINUED | OUTPATIENT
Start: 2024-10-27 | End: 2024-11-04 | Stop reason: HOSPADM

## 2024-10-27 RX ORDER — POTASSIUM CHLORIDE 7.45 MG/ML
10 INJECTION INTRAVENOUS PRN
Status: ACTIVE | OUTPATIENT
Start: 2024-10-27 | End: 2024-10-30

## 2024-10-27 RX ORDER — ENOXAPARIN SODIUM 100 MG/ML
40 INJECTION SUBCUTANEOUS DAILY
Status: DISCONTINUED | OUTPATIENT
Start: 2024-10-28 | End: 2024-11-04 | Stop reason: HOSPADM

## 2024-10-27 RX ORDER — ONDANSETRON 4 MG/1
4 TABLET, ORALLY DISINTEGRATING ORAL EVERY 8 HOURS PRN
Status: DISCONTINUED | OUTPATIENT
Start: 2024-10-27 | End: 2024-11-04 | Stop reason: HOSPADM

## 2024-10-27 RX ORDER — QUETIAPINE FUMARATE 25 MG/1
25 TABLET, FILM COATED ORAL 2 TIMES DAILY PRN
Status: DISCONTINUED | OUTPATIENT
Start: 2024-10-27 | End: 2024-11-04 | Stop reason: HOSPADM

## 2024-10-27 RX ORDER — QUETIAPINE FUMARATE 25 MG/1
25 TABLET, FILM COATED ORAL NIGHTLY
Status: DISCONTINUED | OUTPATIENT
Start: 2024-10-27 | End: 2024-10-27

## 2024-10-27 RX ORDER — MAGNESIUM SULFATE IN WATER 40 MG/ML
2000 INJECTION, SOLUTION INTRAVENOUS PRN
Status: DISCONTINUED | OUTPATIENT
Start: 2024-10-27 | End: 2024-11-04 | Stop reason: HOSPADM

## 2024-10-27 RX ORDER — ACETAMINOPHEN 325 MG/1
650 TABLET ORAL EVERY 6 HOURS PRN
Status: DISCONTINUED | OUTPATIENT
Start: 2024-10-27 | End: 2024-11-04 | Stop reason: HOSPADM

## 2024-10-27 RX ORDER — SODIUM CHLORIDE 0.9 % (FLUSH) 0.9 %
5-40 SYRINGE (ML) INJECTION PRN
Status: DISCONTINUED | OUTPATIENT
Start: 2024-10-27 | End: 2024-11-04 | Stop reason: HOSPADM

## 2024-10-27 RX ORDER — DIVALPROEX SODIUM 125 MG/1
125 TABLET, DELAYED RELEASE ORAL EVERY 12 HOURS SCHEDULED
Status: DISCONTINUED | OUTPATIENT
Start: 2024-10-27 | End: 2024-11-04 | Stop reason: HOSPADM

## 2024-10-27 RX ORDER — SODIUM CHLORIDE 9 MG/ML
INJECTION, SOLUTION INTRAVENOUS PRN
Status: DISCONTINUED | OUTPATIENT
Start: 2024-10-27 | End: 2024-11-04 | Stop reason: HOSPADM

## 2024-10-27 RX ORDER — ACETAMINOPHEN 650 MG/1
650 SUPPOSITORY RECTAL EVERY 6 HOURS PRN
Status: DISCONTINUED | OUTPATIENT
Start: 2024-10-27 | End: 2024-11-04 | Stop reason: HOSPADM

## 2024-10-27 RX ADMIN — DIVALPROEX SODIUM 125 MG: 125 TABLET, DELAYED RELEASE ORAL at 20:04

## 2024-10-27 RX ADMIN — WATER 1000 MG: 1 INJECTION INTRAMUSCULAR; INTRAVENOUS; SUBCUTANEOUS at 19:51

## 2024-10-27 RX ADMIN — Medication 10 ML: at 20:04

## 2024-10-27 RX ADMIN — SODIUM CHLORIDE: 9 INJECTION, SOLUTION INTRAVENOUS at 18:00

## 2024-10-27 RX ADMIN — QUETIAPINE FUMARATE 50 MG: 50 TABLET ORAL at 20:04

## 2024-10-27 RX ADMIN — HALOPERIDOL LACTATE 2 MG: 5 INJECTION, SOLUTION INTRAMUSCULAR at 19:26

## 2024-10-27 RX ADMIN — QUETIAPINE FUMARATE 25 MG: 25 TABLET ORAL at 19:00

## 2024-10-27 RX ADMIN — AZITHROMYCIN MONOHYDRATE 250 MG: 500 INJECTION, POWDER, LYOPHILIZED, FOR SOLUTION INTRAVENOUS at 18:19

## 2024-10-27 ASSESSMENT — LIFESTYLE VARIABLES
HOW MANY STANDARD DRINKS CONTAINING ALCOHOL DO YOU HAVE ON A TYPICAL DAY: PATIENT DOES NOT DRINK
HOW OFTEN DO YOU HAVE A DRINK CONTAINING ALCOHOL: NEVER
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
## 2024-10-28 LAB
ALBUMIN SERPL-MCNC: 2.3 G/DL (ref 3.5–4.6)
ANION GAP SERPL CALCULATED.3IONS-SCNC: 10 MEQ/L (ref 9–15)
BASOPHILS # BLD: 0 K/UL (ref 0–0.2)
BASOPHILS NFR BLD: 0.9 %
BUN SERPL-MCNC: 19 MG/DL (ref 8–23)
CALCIUM SERPL-MCNC: 8.3 MG/DL (ref 8.5–9.9)
CHLORIDE SERPL-SCNC: 107 MEQ/L (ref 95–107)
CO2 SERPL-SCNC: 22 MEQ/L (ref 20–31)
CREAT SERPL-MCNC: 0.65 MG/DL (ref 0.5–0.9)
EOSINOPHIL # BLD: 0.5 K/UL (ref 0–0.7)
EOSINOPHIL NFR BLD: 11.6 %
ERYTHROCYTE [DISTWIDTH] IN BLOOD BY AUTOMATED COUNT: 14.6 % (ref 11.5–14.5)
GLUCOSE SERPL-MCNC: 73 MG/DL (ref 70–99)
HCT VFR BLD AUTO: 32.6 % (ref 37–47)
HGB BLD-MCNC: 10.1 G/DL (ref 12–16)
LYMPHOCYTES # BLD: 1.5 K/UL (ref 1–4.8)
LYMPHOCYTES NFR BLD: 32.7 %
MAGNESIUM SERPL-MCNC: 2.3 MG/DL (ref 1.7–2.4)
MCH RBC QN AUTO: 28.1 PG (ref 27–31.3)
MCHC RBC AUTO-ENTMCNC: 31 % (ref 33–37)
MCV RBC AUTO: 90.8 FL (ref 79.4–94.8)
MONOCYTES # BLD: 0.3 K/UL (ref 0.2–0.8)
MONOCYTES NFR BLD: 5.8 %
NEUTROPHILS # BLD: 2.3 K/UL (ref 1.4–6.5)
NEUTS SEG NFR BLD: 48.6 %
PHOSPHATE SERPL-MCNC: 3.2 MG/DL (ref 2.3–4.8)
PLATELET # BLD AUTO: 360 K/UL (ref 130–400)
POTASSIUM SERPL-SCNC: 4.2 MEQ/L (ref 3.4–4.9)
RBC # BLD AUTO: 3.59 M/UL (ref 4.2–5.4)
SODIUM SERPL-SCNC: 139 MEQ/L (ref 135–144)
WBC # BLD AUTO: 4.7 K/UL (ref 4.8–10.8)

## 2024-10-28 PROCEDURE — 6370000000 HC RX 637 (ALT 250 FOR IP): Performed by: INTERNAL MEDICINE

## 2024-10-28 PROCEDURE — 6370000000 HC RX 637 (ALT 250 FOR IP): Performed by: STUDENT IN AN ORGANIZED HEALTH CARE EDUCATION/TRAINING PROGRAM

## 2024-10-28 PROCEDURE — 6360000002 HC RX W HCPCS: Performed by: INTERNAL MEDICINE

## 2024-10-28 PROCEDURE — 1210000000 HC MED SURG R&B

## 2024-10-28 PROCEDURE — 80069 RENAL FUNCTION PANEL: CPT

## 2024-10-28 PROCEDURE — 85025 COMPLETE CBC W/AUTO DIFF WBC: CPT

## 2024-10-28 PROCEDURE — 2580000003 HC RX 258: Performed by: INTERNAL MEDICINE

## 2024-10-28 PROCEDURE — 36415 COLL VENOUS BLD VENIPUNCTURE: CPT

## 2024-10-28 PROCEDURE — 83735 ASSAY OF MAGNESIUM: CPT

## 2024-10-28 PROCEDURE — 97162 PT EVAL MOD COMPLEX 30 MIN: CPT

## 2024-10-28 RX ORDER — AZITHROMYCIN 250 MG/1
250 TABLET, FILM COATED ORAL EVERY 24 HOURS
Status: COMPLETED | OUTPATIENT
Start: 2024-10-28 | End: 2024-10-29

## 2024-10-28 RX ADMIN — SODIUM CHLORIDE: 9 INJECTION, SOLUTION INTRAVENOUS at 01:54

## 2024-10-28 RX ADMIN — QUETIAPINE FUMARATE 50 MG: 50 TABLET ORAL at 20:22

## 2024-10-28 RX ADMIN — AZITHROMYCIN DIHYDRATE 250 MG: 250 TABLET ORAL at 17:45

## 2024-10-28 RX ADMIN — DIVALPROEX SODIUM 125 MG: 125 TABLET, DELAYED RELEASE ORAL at 21:06

## 2024-10-28 RX ADMIN — WATER 1000 MG: 1 INJECTION INTRAMUSCULAR; INTRAVENOUS; SUBCUTANEOUS at 20:23

## 2024-10-28 RX ADMIN — Medication 10 ML: at 20:32

## 2024-10-28 RX ADMIN — DIVALPROEX SODIUM 125 MG: 125 TABLET, DELAYED RELEASE ORAL at 09:34

## 2024-10-28 RX ADMIN — ENOXAPARIN SODIUM 40 MG: 100 INJECTION SUBCUTANEOUS at 09:34

## 2024-10-28 RX ADMIN — Medication 10 ML: at 09:34

## 2024-10-28 NOTE — CONSULTS
Spiritual Health History and Assessment/Progress Note  [facility]    Initial Encounter, Advance Care Planning,  ,  ,      Name: Any Moreno MRN: [id number]    Age: 84 y.o.     Sex: female   Language: Unavailable   Islam: No Islam on file   Pneumonia due to infectious organism     Date: 10/28/2024            Total Time Calculated: 30 min              Spiritual Assessment began in [facility] 2W ORTHO TELE        Referral/Consult From: Physician   Encounter Overview/Reason: Initial Encounter, Advance Care Planning  Service Provided For: Patient, Family    Pt resting comfortably in bed. Pt able to clearly name her family members whom she wishes to be her heatare decision makers. Phone call to family to inform them and to provide support. Pt's family named facilities they wish to utilize as first three choices for discharge planning, conveyed to SW/CM. Pt is Hoahaoism, important to her, difficulty attending Mormon for some time and less of a priority as her slava has altered.     Melody, Belief, Meaning:   Patient is connected with a melody tradition or spiritual practice  Family/Friends identify as spiritual      Importance and Influence:  Patient has no beliefs influential to healthcare decision-making identified during this visit  Family/Friends have no beliefs influential to healthcare decision-making identified during this visit    Community:  Patient feels well-supported. Support system includes: Children. Advance Care Planning.   Family/Friends feel well-supported. Support system includes: Spouse/Partner    Assessment and Plan of Care:     Patient Interventions include: Facilitated expression of thoughts and feelings  Family/Friends Interventions include: Facilitated expression of thoughts and feelings and Assisted in Advance Care Planning conversation    Patient Plan of Care: Spiritual Care available upon further referral  Family/Friends Plan of Care: Spiritual Care available upon further

## 2024-10-28 NOTE — CARE COORDINATION
Case Management Assessment  Initial Evaluation    Date/Time of Evaluation: 10/28/2024 12:29 PM  Assessment Completed by: PAWAN Prabhakar    If patient is discharged prior to next notation, then this note serves as note for discharge by case management.    Patient Name: Any Moreno                   YOB: 1940  Diagnosis: Pneumonia due to infectious organism [J18.9]                   Date / Time: 10/27/2024 11:09 AM    Patient Admission Status: Inpatient   Readmission Risk (Low < 19, Mod (19-27), High > 27): Readmission Risk Score: 11.5    Current PCP: None, None  PCP verified by CM? No    Chart Reviewed: Yes      History Provided by: Patient, Child/Family  Patient Orientation: Alert and Oriented, Person, Place    Patient Cognition: Dementia / Early Alzheimer's    Hospitalization in the last 30 days (Readmission):  No    If yes, Readmission Assessment in  Navigator will be completed.    Advance Directives:      Code Status: Full Code   Patient's Primary Decision Maker is: Legal Next of Kin    Primary Decision Maker: Carlos Solis - Child - [phone]    Secondary Decision Maker: June Easley - Daughter-in-Law - [phone]    Discharge Planning:    Patient lives with: Spouse/Significant Other Type of Home: Skilled Nursing Facility  Primary Care Giver: Family  Patient Support Systems include: Children   Current Financial resources:    Current community resources:    Current services prior to admission: Skilled Nursing Facility            Current DME:              Type of Home Care services:  None    ADLS  Prior functional level: Assistance with the following:, Mobility, Shopping, Housework, Cooking  Current functional level: Assistance with the following:, Cooking, Housework, Shopping, Mobility, Bathing    PT AM-PAC: 8 /24  OT AM-PAC:   /24    Family can provide assistance at DC: Yes  Would you like Case Management to discuss the discharge plan with any other family members/significant
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
## 2024-10-28 NOTE — ACP (ADVANCE CARE PLANNING)
Advance Care Planning     Advance Care Planning Inpatient Note  [facility] Department    Today's Date: 10/28/2024  Unit: [facility] 2W ORTHO TELE    Received request from HealthCare Provider.  Upon review of chart and communication with care team, patient's decision making abilities are not in question.. Patient was/were present in the room during visit.    Goals of ACP Conversation:  Discuss advance care planning documents    Health Care Decision Makers:       Primary Decision Maker: Carlos Solis - Child - [phone]    Secondary Decision Maker: June Easley - Daughter-in-Law - [phone]  Summary:  Completed New Documents  Updated Healthcare Decision Maker    Advance Care Planning Documents (Patient Wishes):  Healthcare Power of /Advance Directive Appointment of Health Care Agent     Assessment:  Pt resting comfortably in bed. Pt able to clearly name her family members whom she wishes to be her heathcare decision makers. Phone call to family to inform them and to provide support. Pt's family named facilities they wish to utilize as first three choices for discharge planning, conveyed to SW/CM. Pt is Latter-day, important to her, difficulty attending Hinduism for some time and less of a priority as her slava has altered.     Interventions:  Assisted in the completion of documents according to patient's wishes at this time    Care Preferences Communicated:   No    Outcomes/Plan:  New advance directive completed.  Returned original document(s) to patient, as well as copies for distribution to appointed agents  Copy of advance directive given to staff to scan into medical record.  Teach Back Method used to verify the patient's and/or Healthcare Decision Maker's understanding of key information in the advance directive documents    Electronically signed by ANTONIO Canales on 10/28/2024 at 9:53 AM

## 2024-10-28 NOTE — PROGRESS NOTES
Hospitalist Progress Note      PCP: None, None    Date of Admission: 10/27/2024    Chief Complaint:  Failure to thrive    Subjective:  No acute events overnight. Pt resting comfortably in bed. Denies any acute complaints at this time. Denies chest pain or shortness of breath.    Medications:  Reviewed    Infusion Medications    sodium chloride       Scheduled Medications    sodium chloride flush  5-40 mL IntraVENous 2 times per day    enoxaparin  40 mg SubCUTAneous Daily    azithromycin  250 mg IntraVENous Q24H    divalproex  125 mg Oral 2 times per day    cefTRIAXone (ROCEPHIN) 1,000 mg in sterile water 10 mL IV syringe  1,000 mg IntraVENous Q24H    QUEtiapine  50 mg Oral Nightly     PRN Meds: sodium chloride flush, sodium chloride, potassium chloride **OR** potassium alternative oral replacement **OR** potassium chloride, magnesium sulfate, ondansetron **OR** ondansetron, polyethylene glycol, acetaminophen **OR** acetaminophen, QUEtiapine, haloperidol lactate    No intake or output data in the 24 hours ending 10/28/24 1044    Exam:  /75   Pulse 77   Temp 97.7 °F (36.5 °C) (Oral)   Resp 18   Ht 1.655 m (5' 5.16\")   Wt 57.4 kg (126 lb 9.6 oz)   SpO2 97%   BMI 20.97 kg/m²   Physical Exam  Cardiovascular:      Rate and Rhythm: Normal rate and regular rhythm.   Pulmonary:      Effort: Pulmonary effort is normal. No respiratory distress.   Abdominal:      Palpations: Abdomen is soft.      Tenderness: There is no abdominal tenderness.   Musculoskeletal:      Right lower leg: No edema.      Left lower leg: No edema.      Comments: Ulnar deviation of bilateral hands.   Neurological:      Mental Status: She is alert.   Psychiatric:         Mood and Affect: Mood normal.         Behavior: Behavior normal.       Labs:   Recent Labs     10/27/24  1329 10/28/24  0605   WBC 5.9 4.7*   HGB 10.5* 10.1*   HCT 33.7* 32.6*    360     Recent Labs     10/27/24  1333 10/28/24  0605    139   K 4.6 4.2

## 2024-10-28 NOTE — PLAN OF CARE
Problem: Discharge Planning  Goal: Discharge to home or other facility with appropriate resources  Outcome: Progressing  Flowsheets (Taken 10/27/2024 1727 by Shirley Bustos, RN)  Discharge to home or other facility with appropriate resources: Identify barriers to discharge with patient and caregiver     Problem: ABCDS Injury Assessment  Goal: Absence of physical injury  Outcome: Progressing     Problem: Skin/Tissue Integrity  Goal: Absence of new skin breakdown  Description: 1.  Monitor for areas of redness and/or skin breakdown  2.  Assess vascular access sites hourly  3.  Every 4-6 hours minimum:  Change oxygen saturation probe site  4.  Every 4-6 hours:  If on nasal continuous positive airway pressure, respiratory therapy assess nares and determine need for appliance change or resting period.  Outcome: Progressing     Problem: Confusion  Goal: Confusion, delirium, dementia, or psychosis is improved or at baseline  Description: INTERVENTIONS:  1. Assess for possible contributors to thought disturbance, including medications, impaired vision or hearing, underlying metabolic abnormalities, dehydration, psychiatric diagnoses, and notify attending LIP  2. [place] high risk fall precautions, as indicated  3. Provide frequent short contacts to provide reality reorientation, refocusing and direction  4. Decrease environmental stimuli, including noise as appropriate  5. Monitor and intervene to maintain adequate nutrition, hydration, elimination, sleep and activity  6. If unable to ensure safety without constant attention obtain sitter and review sitter guidelines with assigned personnel  7. Initiate Psychosocial CNS and Spiritual Care consult, as indicated  Outcome: Progressing

## 2024-10-28 NOTE — CONSULTS
PODIATRIC MEDICINE AND SURGERY  CONSULT HISTORY AND PHYSICAL        ASSESSMENT:  84 y.o. female  has no past medical history on file. . Pneumonia due to infectious organism [J18.9]  podiatry was consulted for left great toe wound.  Vital signs stable, no leukocytosis, CRP elevated at 25.6.  Dried scab noted at distal aspect of left hallux toenail without any open wounds or signs of infection.     PLAN AND RECOMMENDATIONS::  Patient's case to be discussed with staff, Dr. Butler, who will provide final recommendations going forward  Weightbearing as tolerated to bilateral lower extremity  Continue with Prevalon boots at all times when in bed  Podiatry will sign off  Patient will need follow up with Dr. Butler within 7-10 days of discharge      HPI: This 84 y.o. year old female was seen today for left great toe wound.  Patient states that she must have stubbed her toe however she does not recall any recent trauma.  Patient denies nausea, vomiting, diarrhea, fevers, chills, chest pain, shortness of breath, head ache, or calf pain.  Denies pain at bilateral foot.  No other pedal complaints.     No past medical history on file.    No past surgical history on file.    No current facility-administered medications on file prior to encounter.     No current outpatient medications on file prior to encounter.       Allergies   Allergen Reactions    Losartan Angioedema    Nifedipine      edema    Atenolol Rash    Jackson-2 Inhibitors (Sulfonamide) Rash    Lisinopril Rash       No family history on file.    Social History     Socioeconomic History    Marital status: Unknown     Spouse name: Not on file    Number of children: Not on file    Years of education: Not on file    Highest education level: Not on file   Occupational History    Not on file   Tobacco Use    Smoking status: Not on file    Smokeless tobacco: Not on file   Substance and Sexual Activity    Alcohol use: Not on file    Drug use: Not on file    Sexual activity:  Toenail well adhered without any signs of infection.  Interspaces 1-4 B/L are clear and without debris  No open lesions noted B/L        LABS:   Lab Results   Component Value Date    WBC 4.7 (L) 10/28/2024    HGB 10.1 (L) 10/28/2024    HCT 32.6 (L) 10/28/2024    MCV 90.8 10/28/2024     10/28/2024     Lab Results   Component Value Date/Time     10/28/2024 06:05 AM    K 4.2 10/28/2024 06:05 AM     10/28/2024 06:05 AM    CO2 22 10/28/2024 06:05 AM    BUN 19 10/28/2024 06:05 AM    CREATININE 0.65 10/28/2024 06:05 AM    GLUCOSE 73 10/28/2024 06:05 AM    CALCIUM 8.3 10/28/2024 06:05 AM      No results found for: \"LABPROT\", \"LABALBU\"  No results found for: \"SEDRATE\"  Lab Results   Component Value Date    CRP 25.6 (H) 10/27/2024     No results found for: \"LABA1C\"    MICROBIOLOGY:   Results       ** No results found for the last 336 hours. **              IMAGING:   Xray Result (most recent):  XR CHEST PORTABLE 10/27/2024    Narrative  EXAMINATION:  ONE XRAY VIEW OF THE CHEST    10/27/2024 1:35 pm    COMPARISON:  None.    HISTORY:  ORDERING SYSTEM PROVIDED HISTORY: hx of pneumonia  TECHNOLOGIST PROVIDED HISTORY:  Reason for exam:->hx of pneumonia  What reading provider will be dictating this exam?->CRC    FINDINGS:  Right basilar airspace disease with pleural effusion.  Left lung is  unremarkable.  Cardiomediastinal silhouette is enlarged.  Atherosclerotic  vascular disease.  Postsurgical changes of the chest wall.  No pneumothorax.    Impression  Right basilar airspace disease with pleural effusion, this may represent  infection, aspiration, or edema.            Ivan Perez DPM, DPM PGY-3  Podiatric Surgery Resident  Podiatry On Call Pager: [phone]  October 28, 2024  11:59 AM    The resident/student was under my direct supervision during today's patient encounter. reflection of my personal examination, assessment and treatment plan.  The patient was seen and examined with the resident/student.

## 2024-10-29 PROBLEM — N39.0 UTI (URINARY TRACT INFECTION): Status: ACTIVE | Noted: 2024-10-29

## 2024-10-29 LAB
ANION GAP SERPL CALCULATED.3IONS-SCNC: 8 MEQ/L (ref 9–15)
BASOPHILS # BLD: 0 K/UL (ref 0–0.2)
BASOPHILS NFR BLD: 0.6 %
BUN SERPL-MCNC: 18 MG/DL (ref 8–23)
CALCIUM SERPL-MCNC: 8.8 MG/DL (ref 8.5–9.9)
CHLORIDE SERPL-SCNC: 105 MEQ/L (ref 95–107)
CO2 SERPL-SCNC: 26 MEQ/L (ref 20–31)
CREAT SERPL-MCNC: 0.73 MG/DL (ref 0.5–0.9)
EOSINOPHIL # BLD: 0.5 K/UL (ref 0–0.7)
EOSINOPHIL NFR BLD: 8.5 %
ERYTHROCYTE [DISTWIDTH] IN BLOOD BY AUTOMATED COUNT: 14.5 % (ref 11.5–14.5)
GLUCOSE SERPL-MCNC: 86 MG/DL (ref 70–99)
HCT VFR BLD AUTO: 36.9 % (ref 37–47)
HGB BLD-MCNC: 11.8 G/DL (ref 12–16)
LYMPHOCYTES # BLD: 1.6 K/UL (ref 1–4.8)
LYMPHOCYTES NFR BLD: 29.5 %
MCH RBC QN AUTO: 28.6 PG (ref 27–31.3)
MCHC RBC AUTO-ENTMCNC: 32 % (ref 33–37)
MCV RBC AUTO: 89.3 FL (ref 79.4–94.8)
MONOCYTES # BLD: 0.4 K/UL (ref 0.2–0.8)
MONOCYTES NFR BLD: 6.6 %
NEUTROPHILS # BLD: 2.9 K/UL (ref 1.4–6.5)
NEUTS SEG NFR BLD: 54.2 %
PLATELET # BLD AUTO: 420 K/UL (ref 130–400)
POTASSIUM SERPL-SCNC: 3.9 MEQ/L (ref 3.4–4.9)
RBC # BLD AUTO: 4.13 M/UL (ref 4.2–5.4)
SODIUM SERPL-SCNC: 139 MEQ/L (ref 135–144)
WBC # BLD AUTO: 5.3 K/UL (ref 4.8–10.8)

## 2024-10-29 PROCEDURE — 85025 COMPLETE CBC W/AUTO DIFF WBC: CPT

## 2024-10-29 PROCEDURE — 97166 OT EVAL MOD COMPLEX 45 MIN: CPT

## 2024-10-29 PROCEDURE — 97535 SELF CARE MNGMENT TRAINING: CPT

## 2024-10-29 PROCEDURE — 80048 BASIC METABOLIC PNL TOTAL CA: CPT

## 2024-10-29 PROCEDURE — 2580000003 HC RX 258: Performed by: INTERNAL MEDICINE

## 2024-10-29 PROCEDURE — 6370000000 HC RX 637 (ALT 250 FOR IP): Performed by: STUDENT IN AN ORGANIZED HEALTH CARE EDUCATION/TRAINING PROGRAM

## 2024-10-29 PROCEDURE — 1210000000 HC MED SURG R&B

## 2024-10-29 PROCEDURE — 6370000000 HC RX 637 (ALT 250 FOR IP): Performed by: INTERNAL MEDICINE

## 2024-10-29 PROCEDURE — 6360000002 HC RX W HCPCS: Performed by: INTERNAL MEDICINE

## 2024-10-29 PROCEDURE — 36415 COLL VENOUS BLD VENIPUNCTURE: CPT

## 2024-10-29 RX ORDER — FOLIC ACID 1 MG/1
1 TABLET ORAL DAILY
COMMUNITY

## 2024-10-29 RX ORDER — MECLIZINE HYDROCHLORIDE 25 MG/1
25 TABLET ORAL 3 TIMES DAILY
Status: ON HOLD | COMMUNITY
End: 2024-10-31

## 2024-10-29 RX ORDER — DIVALPROEX SODIUM 250 MG/1
250 TABLET, DELAYED RELEASE ORAL 2 TIMES DAILY
COMMUNITY

## 2024-10-29 RX ADMIN — QUETIAPINE FUMARATE 50 MG: 50 TABLET ORAL at 20:40

## 2024-10-29 RX ADMIN — DIVALPROEX SODIUM 125 MG: 125 TABLET, DELAYED RELEASE ORAL at 20:40

## 2024-10-29 RX ADMIN — Medication 10 ML: at 20:40

## 2024-10-29 RX ADMIN — WATER 1000 MG: 1 INJECTION INTRAMUSCULAR; INTRAVENOUS; SUBCUTANEOUS at 20:41

## 2024-10-29 RX ADMIN — Medication 10 ML: at 11:01

## 2024-10-29 RX ADMIN — ENOXAPARIN SODIUM 40 MG: 100 INJECTION SUBCUTANEOUS at 11:01

## 2024-10-29 RX ADMIN — AZITHROMYCIN DIHYDRATE 250 MG: 250 TABLET ORAL at 19:43

## 2024-10-29 RX ADMIN — QUETIAPINE FUMARATE 25 MG: 25 TABLET ORAL at 23:11

## 2024-10-29 RX ADMIN — DIVALPROEX SODIUM 125 MG: 125 TABLET, DELAYED RELEASE ORAL at 11:01

## 2024-10-29 NOTE — PLAN OF CARE
See OT evaluation for all goals and OT POC. Electronically signed by Criss Easley OTR/L on 10/29/2024 at 12:18 PM
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
## 2024-10-29 NOTE — PLAN OF CARE
Problem: Discharge Planning  Goal: Discharge to home or other facility with appropriate resources  Outcome: Progressing     Problem: ABCDS Injury Assessment  Goal: Absence of physical injury  Outcome: Progressing     Problem: Skin/Tissue Integrity  Goal: Absence of new skin breakdown  Description: 1.  Monitor for areas of redness and/or skin breakdown  2.  Assess vascular access sites hourly  3.  Every 4-6 hours minimum:  Change oxygen saturation probe site  4.  Every 4-6 hours:  If on nasal continuous positive airway pressure, respiratory therapy assess nares and determine need for appliance change or resting period.  Outcome: Progressing     Problem: Confusion  Goal: Confusion, delirium, dementia, or psychosis is improved or at baseline  Description: INTERVENTIONS:  1. Assess for possible contributors to thought disturbance, including medications, impaired vision or hearing, underlying metabolic abnormalities, dehydration, psychiatric diagnoses, and notify attending LIP  2. [place] high risk fall precautions, as indicated  3. Provide frequent short contacts to provide reality reorientation, refocusing and direction  4. Decrease environmental stimuli, including noise as appropriate  5. Monitor and intervene to maintain adequate nutrition, hydration, elimination, sleep and activity  6. If unable to ensure safety without constant attention obtain sitter and review sitter guidelines with assigned personnel  7. Initiate Psychosocial CNS and Spiritual Care consult, as indicated  Outcome: Progressing     Problem: Safety - Adult  Goal: Free from fall injury  Outcome: Progressing

## 2024-10-29 NOTE — CARE COORDINATION
SPOKE W/YUDI BENZ, PUBLIC  TO INFORM OF FINANCIAL CLASS LISTED AS 'SELF-PAY'. SHE IS AWARE AND LOOKING INTO IT. SHE INFORMED SHE WILL CALL DAUGHTER-IN-LAW.
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
## 2024-10-29 NOTE — PROGRESS NOTES
[facility] OCCUPATIONAL THERAPY EVALUATION - ACUTE     NAME: Any ROMERO Moreno  : 1940 (84 y.o.)  MRN: [id number]  CODE STATUS: Full Code  Room: [facility]/W279-01    Date of Service: 10/29/2024    Patient Diagnosis(es): Pneumonia due to infectious organism [J18.9]  UTI (urinary tract infection) [N39.0]   Patient Active Problem List    Diagnosis Date Noted    UTI (urinary tract infection) 10/29/2024    Pneumonia due to infectious organism 10/27/2024        No past medical history on file.  No past surgical history on file.     Restrictions  Restrictions/Precautions: Fall Risk     Safety Devices: Safety Devices  Type of Devices: All fall risk precautions in place     Patient's date of birth confirmed: Yes    General:  Chart Reviewed: Yes  Patient assessed for rehabilitation services?: Yes    Subjective  Subjective: \"I finished my breakfast already\"       Pain at start of treatment: No    Pain at end of treatment: No    Location:   Description:   Nursing notified: Not Applicable  RN:   Intervention: Repositioned    Prior Level of Function:  Social/Functional History  Lives With: Alone  Type of Home: House  Home Layout: Two level, Performs ADL's on one level  Home Access: Stairs to enter without rails  Entrance Stairs - Number of Steps: 3, 4 steps inside the house to kitchen level  Bathroom Shower/Tub: Walk-in shower  Bathroom Equipment: None  Home Equipment: None  Has the patient had two or more falls in the past year or any fall with injury in the past year?: No  ADL Assistance: Independent  Homemaking Assistance: Independent  Ambulation Assistance: Independent (no AD)  Transfer Assistance: Independent  Active : No (does not have a car however)  Patient's  Info: \"I was able to go when I needed to\"  Occupation: Retired  Additional Comments: Questionable historian, difficulty answering questions at times    OBJECTIVE:     Orientation Status:  Orientation  Orientation Level: Oriented to person;Disoriented to  Recommendations:  OT Equipment Recommendations  Other: Continue to assess    OT Education:   Patient Education  Education Given To: Patient  Education Provided: Role of Therapy;Plan of Care  Education Method: Verbal  Barriers to Learning: Cognition  Education Outcome: Verbalized understanding;Continued education needed    OT Follow Up:   OT D/C RECOMMENDATIONS  REQUIRES OT FOLLOW-UP: Yes       Assessment/Discharge Disposition:  Assessment: Pt is an 84 year old woman from home who presents to [facility] with pneumonia. She demonstrates significant weakness and confusion which impact her ability to perform ADL and IADL tasks. Pt would benefit from continued OT to maximize independence and safety with ADL tasks.  Performance deficits / Impairments: Decreased functional mobility , Decreased ADL status, Decreased strength, Decreased endurance, Decreased cognition, Decreased safe awareness, Decreased balance, Decreased high-level IADLs, Decreased fine motor control, Decreased ROM  Prognosis: Good  Discharge Recommendations: Continue to assess pending progress  Decision Making: Medium Complexity     History: Pt's medical history is moderately complex  Exam: Pt has 10 performance deficits  Assistance / Modification: Pt requires max-total A    AMPAC (Six Click) Self care Score   How much help is needed for putting on and taking off regular lower body clothing?: Total  How much help is needed for bathing (which includes washing, rinsing, drying)?: Total  How much help is needed for toileting (which includes using toilet, bedpan, or urinal)?: Total  How much help is needed for putting on and taking off regular upper body clothing?: Total  How much help is needed for taking care of personal grooming?: A Lot  How much help for eating meals?: A Little  AM-[facility] Inpatient Daily Activity Raw Score: 9  AM-PAC Inpatient ADL T-Scale Score : 25.33  ADL Inpatient CMS 0-100% Score: 79.59    Therapy key for assistance levels -   Independent/Mod

## 2024-10-29 NOTE — PROGRESS NOTES
Physical Therapy Med Surg Daily Treatment Note  Facility/Department: [address] ORTHO TELE  Room: [place] [address]       NAME: Any Moreno  : 1940 (84 y.o.)  MRN: [id number]  CODE STATUS: Full Code    Date of Service: 10/29/2024    Patient Diagnosis(es): Pneumonia due to infectious organism [J18.9]  UTI (urinary tract infection) [N39.0]   No chief complaint on file.    Patient Active Problem List    Diagnosis Date Noted    UTI (urinary tract infection) 10/29/2024    Pneumonia due to infectious organism 10/27/2024        No past medical history on file.  No past surgical history on file.    Chart Reviewed: Yes  Patient assessed for rehabilitation services?: Yes  Family / Caregiver Present: No    Restrictions:  Restrictions/Precautions: Fall Risk    SUBJECTIVE:   Subjective: \"I will try.\"    Pain  Pain: Pt noted leg pain but did not rate the pain. RN in room end of tx.    OBJECTIVE:        Bed mobility  Supine to Sit: Maximum assistance;Dependent/Total;2 Person assistance  Sit to Supine: Maximum assistance;Dependent/Total;2 Person assistance  Scooting: Minimal assistance  Bed Mobility Comments: HOB slightly elevated with use of bed rails. Pt attempts to assist with moving LE off the bed and reaching for bed rails, however unable to complete without Max A -> toal A.  Hand over hand assist and vc's for sequencing. Pt is very fearful of falling while sitting EOB.    Transfers  Sit to Stand: Maximum Assistance;2 Person Assistance  Stand to Sit: 2 Person Assistance;Maximum Assistance  Comment: HHA pt able to tolerate a partical stand with both therpaist providing HHA.  Pt significantly fearful of falling while attempting stand, emotional support given.      PT Exercises  Exercise Treatment: Seated EOB: AP/ LAQ, marching x 10          Activity Tolerance  Activity Tolerance: Patient limited by fatigue;Patient limited by endurance  Activity Tolerance Comments: Fear of falling          ASSESSMENT   Assessment: Pt fearful of

## 2024-10-30 PROCEDURE — 97535 SELF CARE MNGMENT TRAINING: CPT

## 2024-10-30 PROCEDURE — 1210000000 HC MED SURG R&B

## 2024-10-30 PROCEDURE — 6370000000 HC RX 637 (ALT 250 FOR IP): Performed by: INTERNAL MEDICINE

## 2024-10-30 PROCEDURE — 2580000003 HC RX 258: Performed by: INTERNAL MEDICINE

## 2024-10-30 PROCEDURE — 6360000002 HC RX W HCPCS: Performed by: INTERNAL MEDICINE

## 2024-10-30 RX ADMIN — QUETIAPINE FUMARATE 25 MG: 25 TABLET ORAL at 23:03

## 2024-10-30 RX ADMIN — Medication 10 ML: at 10:09

## 2024-10-30 RX ADMIN — DIVALPROEX SODIUM 125 MG: 125 TABLET, DELAYED RELEASE ORAL at 20:51

## 2024-10-30 RX ADMIN — QUETIAPINE FUMARATE 50 MG: 50 TABLET ORAL at 20:52

## 2024-10-30 RX ADMIN — DIVALPROEX SODIUM 125 MG: 125 TABLET, DELAYED RELEASE ORAL at 10:08

## 2024-10-30 RX ADMIN — Medication 10 ML: at 20:52

## 2024-10-30 RX ADMIN — ENOXAPARIN SODIUM 40 MG: 100 INJECTION SUBCUTANEOUS at 10:09

## 2024-10-30 NOTE — PLAN OF CARE
Problem: Skin/Tissue Integrity  Goal: Absence of new skin breakdown  Description: 1.  Monitor for areas of redness and/or skin breakdown  2.  Assess vascular access sites hourly  3.  Every 4-6 hours minimum:  Change oxygen saturation probe site  4.  Every 4-6 hours:  If on nasal continuous positive airway pressure, respiratory therapy assess nares and determine need for appliance change or resting period.  Outcome: Progressing     Problem: Confusion  Goal: Confusion, delirium, dementia, or psychosis is improved or at baseline  Description: INTERVENTIONS:  1. Assess for possible contributors to thought disturbance, including medications, impaired vision or hearing, underlying metabolic abnormalities, dehydration, psychiatric diagnoses, and notify attending LIP  2. [place] high risk fall precautions, as indicated  3. Provide frequent short contacts to provide reality reorientation, refocusing and direction  4. Decrease environmental stimuli, including noise as appropriate  5. Monitor and intervene to maintain adequate nutrition, hydration, elimination, sleep and activity  6. If unable to ensure safety without constant attention obtain sitter and review sitter guidelines with assigned personnel  7. Initiate Psychosocial CNS and Spiritual Care consult, as indicated  Outcome: Progressing     Problem: Safety - Adult  Goal: Free from fall injury  Outcome: Progressing

## 2024-10-30 NOTE — PROGRESS NOTES
Hospitalist Progress Note      PCP: None, None    Date of Admission: 10/27/2024    Chief Complaint:  Failure to thrive    Subjective:  No acute events overnight. Pt resting comfortably in bed. Denies any acute complaints at this time. Denies chest pain or shortness of breath.    Medications:  Reviewed    Infusion Medications    sodium chloride       Scheduled Medications    sodium chloride flush  5-40 mL IntraVENous 2 times per day    enoxaparin  40 mg SubCUTAneous Daily    divalproex  125 mg Oral 2 times per day    QUEtiapine  50 mg Oral Nightly     PRN Meds: sodium chloride flush, sodium chloride, magnesium sulfate, ondansetron **OR** ondansetron, polyethylene glycol, acetaminophen **OR** acetaminophen, QUEtiapine, haloperidol lactate      Intake/Output Summary (Last 24 hours) at 10/30/2024 1419  Last data filed at 10/30/2024 1406  Gross per 24 hour   Intake 525 ml   Output 900 ml   Net -375 ml       Exam:  /73   Pulse 98   Temp 98 °F (36.7 °C) (Oral)   Resp 16   Ht 1.655 m (5' 5.16\")   Wt 57.4 kg (126 lb 9.6 oz)   SpO2 98%   BMI 20.97 kg/m²   Physical Exam  Cardiovascular:      Rate and Rhythm: Normal rate and regular rhythm.   Pulmonary:      Effort: Pulmonary effort is normal. No respiratory distress.   Abdominal:      Palpations: Abdomen is soft.      Tenderness: There is no abdominal tenderness.   Musculoskeletal:      Right lower leg: No edema.      Left lower leg: No edema.      Comments: Ulnar deviation of bilateral hands.   Neurological:      Mental Status: She is alert.   Psychiatric:         Mood and Affect: Mood normal.         Behavior: Behavior normal.       Labs:   Recent Labs     10/28/24  0605 10/29/24  0548   WBC 4.7* 5.3   HGB 10.1* 11.8*   HCT 32.6* 36.9*    420*     Recent Labs     10/28/24  0605 10/29/24  0548    139   K 4.2 3.9    105   CO2 22 26   BUN 19 18   CREATININE 0.65 0.73   CALCIUM 8.3* 8.8   PHOS 3.2  --      No results for input(s): \"AST\", \"ALT\",  managing a portion of pt care. Some medical issues are handled by other specialists. Additional work up and treatment should be done in out pt setting by pt PCP and other out pt providers.     In addition to examining and evaluating pt, I spent additional time explaining care, normal and abnormal findings, and treatment plan. All of pt questions were answered. Counseling, diet and education were  provided. Case will be discussed with nursing staff when appropriate. Family will be updated if and when appropriate.      Diet: ADULT DIET; Regular    Code Status: Full Code    Dispo: Pt medically ready for discharge. PT/OT recommending SNF. SW following to help with placement.    Electronically signed by Maritza Durham MD on 10/30/2024 at 2:19 PM

## 2024-10-30 NOTE — PROGRESS NOTES
Physical Therapy Med Surg Daily Treatment Note  Facility/Department: [address] ORTHO TELE  [facility]       NAME: Any Moreno  : 1940 (84 y.o.)  MRN: [id number]  CODE STATUS: Full Code    Date of Service: 10/30/2024    Patient Diagnosis(es): Pneumonia due to infectious organism [J18.9]  UTI (urinary tract infection) [N39.0]   No chief complaint on file.    Patient Active Problem List    Diagnosis Date Noted    UTI (urinary tract infection) 10/29/2024    Pneumonia due to infectious organism 10/27/2024        No past medical history on file.  No past surgical history on file.    Chart Reviewed: Yes    Restrictions:  Restrictions/Precautions: Fall Risk    SUBJECTIVE:   Subjective: I don't think I can do anything    Pain  Pain: Pt noted leg pain but did not rate the pain.     OBJECTIVE:   Orientation  Orientation Level: Oriented to person;Disoriented to place;Disoriented to time  Cognition  Overall Cognitive Status: Exceptions  Arousal/Alertness: Appears intact  Following Commands: Follows one step commands with increased time  Attention Span: Attends with cues to redirect  Memory: Decreased short term memory;Decreased recall of recent events  Safety Judgement: Decreased awareness of need for safety;Decreased awareness of need for assistance  Problem Solving: Assistance required to correct errors made;Assistance required to implement solutions;Assistance required to generate solutions;Assistance required to identify errors made  Insights: Decreased awareness of deficits  Initiation: Requires cues for all  Sequencing: Requires cues for all  Cognition Comment: Pt anxious throughout, requires cues and encouragement to complete tasks as well as frequent reassurance that she is not going to fall    Bed mobility  Rolling to Left: Moderate assistance  Rolling to Right: Moderate assistance  Supine to Sit: Maximum assistance;2 Person assistance  Sit to Supine: 2 Person assistance;Maximum assistance  Bed Mobility
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
## 2024-10-30 NOTE — CARE COORDINATION
LSW called Allie from help to give her the social security number and medical insurance information that the CM was able to get from [facility] UR department.  Information was also provided to Felicitas at [facility] so that she can review and start a precert today.      [facility] cannot accept the pt so LSW sent the referral to [facility].  Family updated.      [facility] has accepted the pt and they can admit her tomorrow.

## 2024-10-30 NOTE — PLAN OF CARE
Problem: Discharge Planning  Goal: Discharge to home or other facility with appropriate resources  Outcome: Progressing     Problem: ABCDS Injury Assessment  Goal: Absence of physical injury  Outcome: Progressing     Problem: Skin/Tissue Integrity  Goal: Absence of new skin breakdown  Description: 1.  Monitor for areas of redness and/or skin breakdown  2.  Assess vascular access sites hourly  3.  Every 4-6 hours minimum:  Change oxygen saturation probe site  4.  Every 4-6 hours:  If on nasal continuous positive airway pressure, respiratory therapy assess nares and determine need for appliance change or resting period.  Outcome: Progressing     Problem: Confusion  Goal: Confusion, delirium, dementia, or psychosis is improved or at baseline  Description: INTERVENTIONS:  1. Assess for possible contributors to thought disturbance, including medications, impaired vision or hearing, underlying metabolic abnormalities, dehydration, psychiatric diagnoses, and notify attending LIP  2. [place] high risk fall precautions, as indicated  3. Provide frequent short contacts to provide reality reorientation, refocusing and direction  4. Decrease environmental stimuli, including noise as appropriate  5. Monitor and intervene to maintain adequate nutrition, hydration, elimination, sleep and activity  6. If unable to ensure safety without constant attention obtain sitter and review sitter guidelines with assigned personnel  7. Initiate Psychosocial CNS and Spiritual Care consult, as indicated  Outcome: Progressing     Problem: Safety - Adult  Goal: Free from fall injury  Outcome: Progressing

## 2024-10-31 PROBLEM — J18.9 PNEUMONIA DUE TO INFECTIOUS ORGANISM: Status: RESOLVED | Noted: 2024-10-27 | Resolved: 2024-10-31

## 2024-10-31 PROBLEM — N39.0 UTI (URINARY TRACT INFECTION): Status: RESOLVED | Noted: 2024-10-29 | Resolved: 2024-10-31

## 2024-10-31 PROCEDURE — 1210000000 HC MED SURG R&B

## 2024-10-31 PROCEDURE — 6370000000 HC RX 637 (ALT 250 FOR IP): Performed by: INTERNAL MEDICINE

## 2024-10-31 PROCEDURE — 2580000003 HC RX 258: Performed by: INTERNAL MEDICINE

## 2024-10-31 PROCEDURE — 97535 SELF CARE MNGMENT TRAINING: CPT

## 2024-10-31 PROCEDURE — 6360000002 HC RX W HCPCS: Performed by: INTERNAL MEDICINE

## 2024-10-31 RX ORDER — MECLIZINE HYDROCHLORIDE 25 MG/1
25 TABLET ORAL 3 TIMES DAILY PRN
DISCHARGE
Start: 2024-10-31

## 2024-10-31 RX ORDER — QUETIAPINE FUMARATE 50 MG/1
50 TABLET, FILM COATED ORAL NIGHTLY
DISCHARGE
Start: 2024-10-31

## 2024-10-31 RX ADMIN — QUETIAPINE FUMARATE 50 MG: 50 TABLET ORAL at 19:09

## 2024-10-31 RX ADMIN — DIVALPROEX SODIUM 125 MG: 125 TABLET, DELAYED RELEASE ORAL at 07:43

## 2024-10-31 RX ADMIN — Medication 10 ML: at 22:07

## 2024-10-31 RX ADMIN — ENOXAPARIN SODIUM 40 MG: 100 INJECTION SUBCUTANEOUS at 07:43

## 2024-10-31 RX ADMIN — DIVALPROEX SODIUM 125 MG: 125 TABLET, DELAYED RELEASE ORAL at 19:10

## 2024-10-31 RX ADMIN — Medication 10 ML: at 07:43

## 2024-10-31 NOTE — PLAN OF CARE
Problem: Discharge Planning  Goal: Discharge to home or other facility with appropriate resources  Outcome: Progressing  Flowsheets (Taken 10/30/2024 2000)  Discharge to home or other facility with appropriate resources: Identify barriers to discharge with patient and caregiver     Problem: ABCDS Injury Assessment  Goal: Absence of physical injury  Outcome: Progressing     Problem: Skin/Tissue Integrity  Goal: Absence of new skin breakdown  Description: 1.  Monitor for areas of redness and/or skin breakdown  2.  Assess vascular access sites hourly  3.  Every 4-6 hours minimum:  Change oxygen saturation probe site  4.  Every 4-6 hours:  If on nasal continuous positive airway pressure, respiratory therapy assess nares and determine need for appliance change or resting period.  10/30/2024 2212 by Teagan Qureshi RN  Outcome: Progressing  10/30/2024 1428 by Ysabel Duncan RN  Outcome: Progressing     Problem: Confusion  Goal: Confusion, delirium, dementia, or psychosis is improved or at baseline  Description: INTERVENTIONS:  1. Assess for possible contributors to thought disturbance, including medications, impaired vision or hearing, underlying metabolic abnormalities, dehydration, psychiatric diagnoses, and notify attending LIP  2. [place] high risk fall precautions, as indicated  3. Provide frequent short contacts to provide reality reorientation, refocusing and direction  4. Decrease environmental stimuli, including noise as appropriate  5. Monitor and intervene to maintain adequate nutrition, hydration, elimination, sleep and activity  6. If unable to ensure safety without constant attention obtain sitter and review sitter guidelines with assigned personnel  7. Initiate Psychosocial CNS and Spiritual Care consult, as indicated  10/30/2024 2212 by Teagan Qureshi, RN  Outcome: Progressing  Flowsheets (Taken 10/30/2024 2000)  Effect of thought disturbance (confusion, delirium, dementia, or psychosis) are  managed with adequate functional status: Assess for contributors to thought disturbance, including medications, impaired vision or hearing, underlying metabolic abnormalities, dehydration, psychiatric diagnoses, notify LIP  10/30/2024 1428 by Ysabel Duncan RN  Outcome: Progressing     Problem: Safety - Adult  Goal: Free from fall injury  10/30/2024 2212 by Teagan Qureshi, RN  Outcome: Progressing  10/30/2024 1428 by Ysabel Duncan RN  Outcome: Progressing

## 2024-10-31 NOTE — CARE COORDINATION
PAWAN RECEIVED A CALL FROM VISHNU AT THE [facility] AND SHE RAN INSURANCE AND PT' UMR SHOWS AS PRIMARY SO A PRECERT IS NEEDED.  VISHNU SUBMITTED FOR PRECERT TODAY.
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
## 2024-10-31 NOTE — DISCHARGE INSTR - COC
Continuity of Care Form    Patient Name: Any Moreno   :  1940  MRN:  [id number]    Admit date:  10/27/2024  Discharge date:  ***    Code Status Order: Full Code   Advance Directives:   Advance Care Flowsheet Documentation             Admitting Physician:  Neva Chau DO  PCP: None, None    Discharging Nurse: Genesis Sheridan RN  Discharging Hospital Unit/Room#: W279/W279-01  Discharging Unit Phone Number: [phone]    Emergency Contact:   Extended Emergency Contact Information  Primary Emergency Contact: June Easley  Mobile Phone: [phone]  Relation: Daughter-in-Law  Preferred language: English   needed? No  Secondary Emergency Contact: IrmaCarlos  Mobile Phone: [phone]  Relation: Child  Preferred language: English   needed? No    Past Surgical History:  No past surgical history on file.    Immunization History:     There is no immunization history on file for this patient.    Active Problems:  Patient Active Problem List   Diagnosis Code    Pneumonia due to infectious organism J18.9    UTI (urinary tract infection) N39.0       Isolation/Infection:   Isolation            No Isolation          Patient Infection Status       None to display            Nurse Assessment:  Last Vital Signs: /78   Pulse 90   Temp 97.5 °F (36.4 °C) (Oral)   Resp 18   Ht 1.655 m (5' 5.16\")   Wt 57.4 kg (126 lb 9.6 oz)   SpO2 98%   BMI 20.97 kg/m²     Last documented pain score (0-10 scale):    Last Weight:   Wt Readings from Last 1 Encounters:   10/27/24 57.4 kg (126 lb 9.6 oz)     Mental Status:  disoriented    IV Access:  - None    Nursing Mobility/ADLs:  Walking   Dependent  Transfer  Dependent  Bathing  Dependent  Dressing  Dependent  Toileting  Dependent  Feeding  Dependent  Med Admin  Dependent  Med Delivery   whole    Wound Care Documentation and Therapy:        Elimination:  Continence:   Bowel: No  Bladder: No  Urinary Catheter: None   Colostomy/Ileostomy/Ileal Conduit: No

## 2024-10-31 NOTE — DISCHARGE INSTR - DIET

## 2024-10-31 NOTE — PLAN OF CARE
Problem: Discharge Planning  Goal: Discharge to home or other facility with appropriate resources  10/31/2024 0917 by Kirsten Pena RN  Outcome: Progressing  10/30/2024 2212 by Teagan Qureshi RN  Outcome: Progressing  Flowsheets (Taken 10/30/2024 2000)  Discharge to home or other facility with appropriate resources: Identify barriers to discharge with patient and caregiver     Problem: ABCDS Injury Assessment  Goal: Absence of physical injury  10/31/2024 0917 by Kirsten Pena RN  Outcome: Progressing  10/30/2024 2212 by Teagan Qureshi RN  Outcome: Progressing     Problem: Skin/Tissue Integrity  Goal: Absence of new skin breakdown  Description: 1.  Monitor for areas of redness and/or skin breakdown  2.  Assess vascular access sites hourly  3.  Every 4-6 hours minimum:  Change oxygen saturation probe site  4.  Every 4-6 hours:  If on nasal continuous positive airway pressure, respiratory therapy assess nares and determine need for appliance change or resting period.  10/31/2024 0917 by Kirsten Pena RN  Outcome: Progressing  10/30/2024 2212 by Teagan Qureshi RN  Outcome: Progressing     Problem: Confusion  Goal: Confusion, delirium, dementia, or psychosis is improved or at baseline  Description: INTERVENTIONS:  1. Assess for possible contributors to thought disturbance, including medications, impaired vision or hearing, underlying metabolic abnormalities, dehydration, psychiatric diagnoses, and notify attending LIP  2. [place] high risk fall precautions, as indicated  3. Provide frequent short contacts to provide reality reorientation, refocusing and direction  4. Decrease environmental stimuli, including noise as appropriate  5. Monitor and intervene to maintain adequate nutrition, hydration, elimination, sleep and activity  6. If unable to ensure safety without constant attention obtain sitter and review sitter guidelines with assigned personnel  7. Initiate Psychosocial CNS and Spiritual

## 2024-10-31 NOTE — FLOWSHEET NOTE
0918: Patient assessments completed, patient to transfer to [facility] today. Patient turned and repositioned every hour as tolerated. Assist with meals. Appetite good. Call light in reach, will continue to monitor throughout this shift. .Electronically signed by Kirsten Pena RN on 10/31/2024 at 9:19 AM

## 2024-10-31 NOTE — PROGRESS NOTES
Physical Therapy Med Surg Daily Treatment Note  Facility/Department: [address] ORTHO TELE  Room: W279/W279-01       NAME: Any Moreno  : 1940 (84 y.o.)  MRN: [id number]  CODE STATUS: Full Code    Date of Service: 10/31/2024    Patient Diagnosis(es): Pneumonia due to infectious organism [J18.9]  UTI (urinary tract infection) [N39.0]   No chief complaint on file.    There are no problems to display for this patient.       No past medical history on file.  No past surgical history on file.         Restrictions:  Restrictions/Precautions: Fall Risk    SUBJECTIVE:   Subjective: My hands look awful.    Pain  Pain: Pt did not report any pain initially.  Pt reported intermittent pain with movement.    OBJECTIVE:        Bed mobility  Rolling to Left: Moderate assistance  Rolling to Right: Moderate assistance  Supine to Sit: Moderate assistance;Maximum assistance  Sit to Supine: Moderate assistance;Maximum assistance  Scooting: Moderate assistance    Transfers  Comment: Attempted to stand multiple times:  pt crying out \"I'm going to fall\".  Pt very fearful and unable to stand.                                              ASSESSMENT   Body Structures, Functions, Activity Limitations Requiring Skilled Therapeutic Intervention: Decreased functional mobility ;Decreased ADL status;Decreased ROM;Decreased body mechanics;Decreased strength;Decreased balance;Decreased endurance;Decreased safe awareness;Decreased cognition;Increased pain;Decreased coordination  Assessment: Pt limited by fear > pain.  Pt able to sit up EOB without LOB, but needed slow, purposeful movements to get there.  Pt unable to hold onto a walker secondary to RA.  Bilateral platform walker recommended in future.  Attempted to stand pt with arms under pt's forearms and with face to face, but was unsuccessful.  Yesterday, PTA had placed pt's hands across top of walker, which may need to be tried again that way.  Pt also very confused and poor historian.

## 2024-11-01 PROCEDURE — 2580000003 HC RX 258: Performed by: INTERNAL MEDICINE

## 2024-11-01 PROCEDURE — 97535 SELF CARE MNGMENT TRAINING: CPT

## 2024-11-01 PROCEDURE — 1210000000 HC MED SURG R&B

## 2024-11-01 PROCEDURE — 6360000002 HC RX W HCPCS: Performed by: INTERNAL MEDICINE

## 2024-11-01 PROCEDURE — 6370000000 HC RX 637 (ALT 250 FOR IP): Performed by: INTERNAL MEDICINE

## 2024-11-01 RX ADMIN — Medication 10 ML: at 20:00

## 2024-11-01 RX ADMIN — DIVALPROEX SODIUM 125 MG: 125 TABLET, DELAYED RELEASE ORAL at 09:36

## 2024-11-01 RX ADMIN — Medication 10 ML: at 09:36

## 2024-11-01 RX ADMIN — QUETIAPINE FUMARATE 50 MG: 50 TABLET ORAL at 20:00

## 2024-11-01 RX ADMIN — ACETAMINOPHEN 325MG 650 MG: 325 TABLET ORAL at 19:59

## 2024-11-01 RX ADMIN — DIVALPROEX SODIUM 125 MG: 125 TABLET, DELAYED RELEASE ORAL at 20:00

## 2024-11-01 RX ADMIN — ENOXAPARIN SODIUM 40 MG: 100 INJECTION SUBCUTANEOUS at 09:36

## 2024-11-01 ASSESSMENT — PAIN DESCRIPTION - LOCATION: LOCATION: BACK

## 2024-11-01 ASSESSMENT — PAIN SCALES - GENERAL
PAINLEVEL_OUTOF10: 2
PAINLEVEL_OUTOF10: 0

## 2024-11-01 ASSESSMENT — PAIN SCALES - WONG BAKER: WONGBAKER_NUMERICALRESPONSE: NO HURT

## 2024-11-01 NOTE — PLAN OF CARE
Problem: Discharge Planning  Goal: Discharge to home or other facility with appropriate resources  Outcome: Progressing     Problem: ABCDS Injury Assessment  Goal: Absence of physical injury  Outcome: Progressing     Problem: Skin/Tissue Integrity  Goal: Absence of new skin breakdown  Description: 1.  Monitor for areas of redness and/or skin breakdown  2.  Assess vascular access sites hourly  3.  Every 4-6 hours minimum:  Change oxygen saturation probe site  4.  Every 4-6 hours:  If on nasal continuous positive airway pressure, respiratory therapy assess nares and determine need for appliance change or resting period.  Outcome: Progressing     Problem: Confusion  Goal: Confusion, delirium, dementia, or psychosis is improved or at baseline  Description: INTERVENTIONS:  1. Assess for possible contributors to thought disturbance, including medications, impaired vision or hearing, underlying metabolic abnormalities, dehydration, psychiatric diagnoses, and notify attending LIP  2. [place] high risk fall precautions, as indicated  3. Provide frequent short contacts to provide reality reorientation, refocusing and direction  4. Decrease environmental stimuli, including noise as appropriate  5. Monitor and intervene to maintain adequate nutrition, hydration, elimination, sleep and activity  6. If unable to ensure safety without constant attention obtain sitter and review sitter guidelines with assigned personnel  7. Initiate Psychosocial CNS and Spiritual Care consult, as indicated  Outcome: Progressing     Problem: Safety - Adult  Goal: Free from fall injury  Outcome: Progressing

## 2024-11-01 NOTE — PROGRESS NOTES
[facility] MED SURG TREATMENT NOTE     Date: 2024  Patient Name: Any Moreno        MRN: [id number]  Account: [id number]   : 1940  (84 y.o.)  Room: [place] 0188179-01    Chart Review:    Restrictions  Restrictions/Precautions  Restrictions/Precautions: Fall Risk     Safety:  Safety Devices  Type of Devices: All fall risk precautions in place;Bed alarm in place;Call light within reach;Left in bed    Patient's birthday verified: Pt verbally unable, verified via wrist band  Pt able to state day but not month or year. Pt able to confirm  after therapist reads out loud from wrist band.     Subjective: \"What time is it?\"    Pain   Pain at start of treatment: No    Pain at end of treatment: No      Orientation Status:  Orientation Level: Oriented to person;Disoriented to place;Disoriented to time    Objective: Pt in bed sleeping soundly upon arrival. Pt easily wakes, however, remains lethargic. Pt agreeable to completing grooming at bed level. Pt completed as follows.     ADL Status:  Grooming: Maximum assistance  Grooming Skilled Clinical Factors: Pt washed face and completed hair care at bed level. Pt able to wash face with Min A for thoroughness. Provided Max A to brush hair with pt only able to reach front right of head. Pt declined to complete oral care until after lunch.     Cognition Status:  Overall Cognitive Status: Exceptions  Arousal/Alertness: Inconsistent responses to stimuli  Following Commands: Follows one step commands with repetition;Follows one step commands with increased time  Memory: Decreased recall of recent events      Therapy key for assistance levels -   Independent/Mod I = Pt. is able to perform task with no assistance but may require a device   Stand by assistance = Pt. does not perform task at an independent level but does not need physical assistance, requires verbal cues  Minimal, Moderate, Maximal Assistance = Pt. requires physical assistance (25%, 50%, 75%

## 2024-11-01 NOTE — PLAN OF CARE
Problem: Skin/Tissue Integrity  Goal: Absence of new skin breakdown  Description: 1.  Monitor for areas of redness and/or skin breakdown  2.  Assess vascular access sites hourly  3.  Every 4-6 hours minimum:  Change oxygen saturation probe site  4.  Every 4-6 hours:  If on nasal continuous positive airway pressure, respiratory therapy assess nares and determine need for appliance change or resting period.  11/1/2024 1035 by Kathleen Lazar RN  Outcome: Progressing  11/1/2024 0205 by Daniel Todd RN  Outcome: Progressing     Problem: Confusion  Goal: Confusion, delirium, dementia, or psychosis is improved or at baseline  Description: INTERVENTIONS:  1. Assess for possible contributors to thought disturbance, including medications, impaired vision or hearing, underlying metabolic abnormalities, dehydration, psychiatric diagnoses, and notify attending LIP  2. [place] high risk fall precautions, as indicated  3. Provide frequent short contacts to provide reality reorientation, refocusing and direction  4. Decrease environmental stimuli, including noise as appropriate  5. Monitor and intervene to maintain adequate nutrition, hydration, elimination, sleep and activity  6. If unable to ensure safety without constant attention obtain sitter and review sitter guidelines with assigned personnel  7. Initiate Psychosocial CNS and Spiritual Care consult, as indicated  11/1/2024 1035 by Kathleen Lazar RN  Outcome: Progressing  11/1/2024 0205 by Daniel Todd RN  Outcome: Progressing

## 2024-11-02 PROCEDURE — 2580000003 HC RX 258: Performed by: INTERNAL MEDICINE

## 2024-11-02 PROCEDURE — 6370000000 HC RX 637 (ALT 250 FOR IP): Performed by: INTERNAL MEDICINE

## 2024-11-02 PROCEDURE — 6360000002 HC RX W HCPCS: Performed by: INTERNAL MEDICINE

## 2024-11-02 PROCEDURE — 1210000000 HC MED SURG R&B

## 2024-11-02 RX ADMIN — DIVALPROEX SODIUM 125 MG: 125 TABLET, DELAYED RELEASE ORAL at 09:52

## 2024-11-02 RX ADMIN — DIVALPROEX SODIUM 125 MG: 125 TABLET, DELAYED RELEASE ORAL at 20:10

## 2024-11-02 RX ADMIN — QUETIAPINE FUMARATE 50 MG: 50 TABLET ORAL at 20:10

## 2024-11-02 RX ADMIN — Medication 10 ML: at 09:52

## 2024-11-02 RX ADMIN — Medication 10 ML: at 20:10

## 2024-11-02 RX ADMIN — ENOXAPARIN SODIUM 40 MG: 100 INJECTION SUBCUTANEOUS at 09:51

## 2024-11-02 ASSESSMENT — PAIN SCALES - GENERAL: PAINLEVEL_OUTOF10: 0

## 2024-11-02 NOTE — PLAN OF CARE
Problem: Discharge Planning  Goal: Discharge to home or other facility with appropriate resources  Outcome: Not Progressing     Problem: ABCDS Injury Assessment  Goal: Absence of physical injury  Outcome: Not Progressing     Problem: Skin/Tissue Integrity  Goal: Absence of new skin breakdown  Description: 1.  Monitor for areas of redness and/or skin breakdown  2.  Assess vascular access sites hourly  3.  Every 4-6 hours minimum:  Change oxygen saturation probe site  4.  Every 4-6 hours:  If on nasal continuous positive airway pressure, respiratory therapy assess nares and determine need for appliance change or resting period.  Outcome: Not Progressing     Problem: Discharge Planning  Goal: Discharge to home or other facility with appropriate resources  Outcome: Not Progressing     Problem: ABCDS Injury Assessment  Goal: Absence of physical injury  Outcome: Not Progressing     Problem: Skin/Tissue Integrity  Goal: Absence of new skin breakdown  Description: 1.  Monitor for areas of redness and/or skin breakdown  2.  Assess vascular access sites hourly  3.  Every 4-6 hours minimum:  Change oxygen saturation probe site  4.  Every 4-6 hours:  If on nasal continuous positive airway pressure, respiratory therapy assess nares and determine need for appliance change or resting period.  Outcome: Not Progressing

## 2024-11-02 NOTE — PROGRESS NOTES
Physical Therapy Missed Treatment   Facility/Department: [facility] MED SURG W279/W279-01    NAME: Any Moreno    : 1940 (84 y.o.)  MRN: [id number]    Account: [id number]  Gender: female      Attempted physical therapy treatment at 1530. Patient declined stating I was just falling asleep. Asked patient if she would like to readjust or sit and declined further. Educated that we would be back  or Monday, with patient being agreeable. Nursing staff notified.    Will follow and attempt PT evaluation again at earliest availability.     Socorro Guadarrama, PT, 24 at 3:33 PM

## 2024-11-02 NOTE — PROGRESS NOTES
Hospitalist Progress Note      PCP: None, None    Date of Admission: 10/27/2024    Chief Complaint:  Failure to thrive    Subjective:  No acute events overnight. Pt resting comfortably in bed. Denies any acute complaints at this time. Denies chest pain or shortness of breath.    Medications:  Reviewed    Infusion Medications    sodium chloride       Scheduled Medications    sodium chloride flush  5-40 mL IntraVENous 2 times per day    enoxaparin  40 mg SubCUTAneous Daily    divalproex  125 mg Oral 2 times per day    QUEtiapine  50 mg Oral Nightly     PRN Meds: sodium chloride flush, sodium chloride, magnesium sulfate, ondansetron **OR** ondansetron, polyethylene glycol, acetaminophen **OR** acetaminophen, QUEtiapine, haloperidol lactate      Intake/Output Summary (Last 24 hours) at 11/2/2024 1705  Last data filed at 11/2/2024 1252  Gross per 24 hour   Intake 455 ml   Output 600 ml   Net -145 ml       Exam:  /60   Pulse 90   Temp 98.2 °F (36.8 °C) (Oral)   Resp 18   Ht 1.655 m (5' 5.16\")   Wt 57.4 kg (126 lb 9.6 oz)   SpO2 100%   BMI 20.97 kg/m²   Physical Exam  Cardiovascular:      Rate and Rhythm: Normal rate and regular rhythm.   Pulmonary:      Effort: Pulmonary effort is normal. No respiratory distress.   Abdominal:      Palpations: Abdomen is soft.      Tenderness: There is no abdominal tenderness.   Musculoskeletal:      Right lower leg: No edema.      Left lower leg: No edema.      Comments: Ulnar deviation of bilateral hands.   Neurological:      Mental Status: She is alert.   Psychiatric:         Mood and Affect: Mood normal.         Behavior: Behavior normal.       Labs:   No results for input(s): \"WBC\", \"HGB\", \"HCT\", \"PLT\" in the last 72 hours.    No results for input(s): \"NA\", \"K\", \"CL\", \"CO2\", \"BUN\", \"CREATININE\", \"CALCIUM\", \"PHOS\" in the last 72 hours.    Invalid input(s): \"MAGNES\"    No results for input(s): \"AST\", \"ALT\", \"BILIDIR\", \"BILITOT\", \"ALKPHOS\" in the last 72 hours.  No results
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
## 2024-11-02 NOTE — CARE COORDINATION
Plan remain to [facility]. Pre-cert started on 10/31/2024. LSW spoke with Admission on weekend. Pre-cert still pending at this time.     Electronically signed by PAWAN Cheney on 11/2/2024 at 12:03 PM

## 2024-11-03 PROCEDURE — 2580000003 HC RX 258: Performed by: INTERNAL MEDICINE

## 2024-11-03 PROCEDURE — 6370000000 HC RX 637 (ALT 250 FOR IP): Performed by: INTERNAL MEDICINE

## 2024-11-03 PROCEDURE — 6360000002 HC RX W HCPCS: Performed by: INTERNAL MEDICINE

## 2024-11-03 PROCEDURE — 1210000000 HC MED SURG R&B

## 2024-11-03 RX ADMIN — Medication 10 ML: at 20:52

## 2024-11-03 RX ADMIN — DIVALPROEX SODIUM 125 MG: 125 TABLET, DELAYED RELEASE ORAL at 20:53

## 2024-11-03 RX ADMIN — ACETAMINOPHEN 325MG 650 MG: 325 TABLET ORAL at 14:06

## 2024-11-03 RX ADMIN — POLYETHYLENE GLYCOL 3350 17 G: 17 POWDER, FOR SOLUTION ORAL at 14:05

## 2024-11-03 RX ADMIN — DIVALPROEX SODIUM 125 MG: 125 TABLET, DELAYED RELEASE ORAL at 09:21

## 2024-11-03 RX ADMIN — QUETIAPINE FUMARATE 50 MG: 50 TABLET ORAL at 20:53

## 2024-11-03 RX ADMIN — ENOXAPARIN SODIUM 40 MG: 100 INJECTION SUBCUTANEOUS at 09:21

## 2024-11-03 RX ADMIN — ACETAMINOPHEN 325MG 650 MG: 325 TABLET ORAL at 20:52

## 2024-11-03 RX ADMIN — Medication 10 ML: at 09:21

## 2024-11-03 ASSESSMENT — PAIN SCALES - GENERAL
PAINLEVEL_OUTOF10: 3
PAINLEVEL_OUTOF10: 4
PAINLEVEL_OUTOF10: 0

## 2024-11-03 ASSESSMENT — PAIN DESCRIPTION - LOCATION
LOCATION: BACK
LOCATION: BACK

## 2024-11-03 ASSESSMENT — PAIN DESCRIPTION - ORIENTATION: ORIENTATION: LOWER

## 2024-11-03 ASSESSMENT — PAIN DESCRIPTION - DESCRIPTORS: DESCRIPTORS: ACHING

## 2024-11-03 NOTE — PLAN OF CARE
Problem: Skin/Tissue Integrity  Goal: Absence of new skin breakdown  Description: 1.  Monitor for areas of redness and/or skin breakdown  2.  Assess vascular access sites hourly  3.  Every 4-6 hours minimum:  Change oxygen saturation probe site  4.  Every 4-6 hours:  If on nasal continuous positive airway pressure, respiratory therapy assess nares and determine need for appliance change or resting period.  Outcome: Progressing     Problem: Confusion  Goal: Confusion, delirium, dementia, or psychosis is improved or at baseline  Description: INTERVENTIONS:  1. Assess for possible contributors to thought disturbance, including medications, impaired vision or hearing, underlying metabolic abnormalities, dehydration, psychiatric diagnoses, and notify attending LIP  2. [place] high risk fall precautions, as indicated  3. Provide frequent short contacts to provide reality reorientation, refocusing and direction  4. Decrease environmental stimuli, including noise as appropriate  5. Monitor and intervene to maintain adequate nutrition, hydration, elimination, sleep and activity  6. If unable to ensure safety without constant attention obtain sitter and review sitter guidelines with assigned personnel  7. Initiate Psychosocial CNS and Spiritual Care consult, as indicated  Outcome: Progressing     Problem: Safety - Adult  Goal: Free from fall injury  Outcome: Progressing     Problem: Pain  Goal: Verbalizes/displays adequate comfort level or baseline comfort level  Outcome: Progressing

## 2024-11-03 NOTE — PROGRESS NOTES
Hospitalist Progress Note      PCP: None, None    Date of Admission: 10/27/2024    Chief Complaint:  Failure to thrive    Subjective:  No acute events overnight. Pt resting comfortably in bed. Denies any acute complaints at this time. Denies chest pain or shortness of breath.    Medications:  Reviewed    Infusion Medications    sodium chloride       Scheduled Medications    sodium chloride flush  5-40 mL IntraVENous 2 times per day    enoxaparin  40 mg SubCUTAneous Daily    divalproex  125 mg Oral 2 times per day    QUEtiapine  50 mg Oral Nightly     PRN Meds: sodium chloride flush, sodium chloride, magnesium sulfate, ondansetron **OR** ondansetron, polyethylene glycol, acetaminophen **OR** acetaminophen, QUEtiapine, haloperidol lactate      Intake/Output Summary (Last 24 hours) at 11/3/2024 1144  Last data filed at 11/3/2024 0839  Gross per 24 hour   Intake 850 ml   Output 1550 ml   Net -700 ml       Exam:  /69   Pulse 76   Temp 97.7 °F (36.5 °C) (Oral)   Resp 16   Ht 1.655 m (5' 5.16\")   Wt 57.4 kg (126 lb 9.6 oz)   SpO2 94%   BMI 20.97 kg/m²   Physical Exam  Cardiovascular:      Rate and Rhythm: Normal rate and regular rhythm.   Pulmonary:      Effort: Pulmonary effort is normal. No respiratory distress.   Abdominal:      Palpations: Abdomen is soft.      Tenderness: There is no abdominal tenderness.   Musculoskeletal:      Right lower leg: No edema.      Left lower leg: No edema.      Comments: Ulnar deviation of bilateral hands.   Neurological:      Mental Status: She is alert.   Psychiatric:         Mood and Affect: Mood normal.         Behavior: Behavior normal.       Labs:   No results for input(s): \"WBC\", \"HGB\", \"HCT\", \"PLT\" in the last 72 hours.    No results for input(s): \"NA\", \"K\", \"CL\", \"CO2\", \"BUN\", \"CREATININE\", \"CALCIUM\", \"PHOS\" in the last 72 hours.    Invalid input(s): \"MAGNES\"    No results for input(s): \"AST\", \"ALT\", \"BILIDIR\", \"BILITOT\", \"ALKPHOS\" in the last 72 hours.  No results

## 2024-11-03 NOTE — PLAN OF CARE
Problem: Confusion  Goal: Confusion, delirium, dementia, or psychosis is improved or at baseline  Description: INTERVENTIONS:  1. Assess for possible contributors to thought disturbance, including medications, impaired vision or hearing, underlying metabolic abnormalities, dehydration, psychiatric diagnoses, and notify attending LIP  2. [place] high risk fall precautions, as indicated  3. Provide frequent short contacts to provide reality reorientation, refocusing and direction  4. Decrease environmental stimuli, including noise as appropriate  5. Monitor and intervene to maintain adequate nutrition, hydration, elimination, sleep and activity  6. If unable to ensure safety without constant attention obtain sitter and review sitter guidelines with assigned personnel  7. Initiate Psychosocial CNS and Spiritual Care consult, as indicated  11/3/2024 0031 by Felicitas Pickens RN  Outcome: Not Progressing  11/2/2024 1609 by Ysabel Duncan RN  Outcome: Progressing     Problem: Safety - Adult  Goal: Free from fall injury  11/3/2024 0031 by Felicitas Pickens, RN  Outcome: Not Progressing  11/2/2024 1609 by Ysabel Duncan RN  Outcome: Progressing   Patient free of injury, will continue with education due to patients poor memory/confusion

## 2024-11-04 VITALS
HEART RATE: 98 BPM | DIASTOLIC BLOOD PRESSURE: 63 MMHG | OXYGEN SATURATION: 100 % | HEIGHT: 65 IN | WEIGHT: 126.6 LBS | RESPIRATION RATE: 18 BRPM | BODY MASS INDEX: 21.09 KG/M2 | SYSTOLIC BLOOD PRESSURE: 136 MMHG | TEMPERATURE: 98.4 F

## 2024-11-04 LAB
ANION GAP SERPL CALCULATED.3IONS-SCNC: 8 MEQ/L (ref 9–15)
BASOPHILS # BLD: 0 K/UL (ref 0–0.2)
BASOPHILS NFR BLD: 0.8 %
BUN SERPL-MCNC: 18 MG/DL (ref 8–23)
CALCIUM SERPL-MCNC: 9.1 MG/DL (ref 8.5–9.9)
CHLORIDE SERPL-SCNC: 105 MEQ/L (ref 95–107)
CO2 SERPL-SCNC: 28 MEQ/L (ref 20–31)
CREAT SERPL-MCNC: 0.81 MG/DL (ref 0.5–0.9)
EOSINOPHIL # BLD: 0.4 K/UL (ref 0–0.7)
EOSINOPHIL NFR BLD: 9.1 %
ERYTHROCYTE [DISTWIDTH] IN BLOOD BY AUTOMATED COUNT: 14.5 % (ref 11.5–14.5)
GLUCOSE SERPL-MCNC: 80 MG/DL (ref 70–99)
HCT VFR BLD AUTO: 35.7 % (ref 37–47)
HGB BLD-MCNC: 11.4 G/DL (ref 12–16)
LYMPHOCYTES # BLD: 1.8 K/UL (ref 1–4.8)
LYMPHOCYTES NFR BLD: 36.2 %
MCH RBC QN AUTO: 28.7 PG (ref 27–31.3)
MCHC RBC AUTO-ENTMCNC: 31.9 % (ref 33–37)
MCV RBC AUTO: 89.9 FL (ref 79.4–94.8)
MONOCYTES # BLD: 0.3 K/UL (ref 0.2–0.8)
MONOCYTES NFR BLD: 6.6 %
NEUTROPHILS # BLD: 2.3 K/UL (ref 1.4–6.5)
NEUTS SEG NFR BLD: 46.7 %
PLATELET # BLD AUTO: 357 K/UL (ref 130–400)
POTASSIUM SERPL-SCNC: 4.4 MEQ/L (ref 3.4–4.9)
RBC # BLD AUTO: 3.97 M/UL (ref 4.2–5.4)
SODIUM SERPL-SCNC: 141 MEQ/L (ref 135–144)
WBC # BLD AUTO: 4.8 K/UL (ref 4.8–10.8)

## 2024-11-04 PROCEDURE — 85025 COMPLETE CBC W/AUTO DIFF WBC: CPT

## 2024-11-04 PROCEDURE — 6360000002 HC RX W HCPCS: Performed by: INTERNAL MEDICINE

## 2024-11-04 PROCEDURE — 97535 SELF CARE MNGMENT TRAINING: CPT

## 2024-11-04 PROCEDURE — 80048 BASIC METABOLIC PNL TOTAL CA: CPT

## 2024-11-04 PROCEDURE — 6370000000 HC RX 637 (ALT 250 FOR IP): Performed by: INTERNAL MEDICINE

## 2024-11-04 PROCEDURE — 36415 COLL VENOUS BLD VENIPUNCTURE: CPT

## 2024-11-04 PROCEDURE — 2580000003 HC RX 258: Performed by: INTERNAL MEDICINE

## 2024-11-04 PROCEDURE — 97110 THERAPEUTIC EXERCISES: CPT

## 2024-11-04 RX ADMIN — Medication 10 ML: at 10:21

## 2024-11-04 RX ADMIN — DIVALPROEX SODIUM 125 MG: 125 TABLET, DELAYED RELEASE ORAL at 20:38

## 2024-11-04 RX ADMIN — DIVALPROEX SODIUM 125 MG: 125 TABLET, DELAYED RELEASE ORAL at 11:06

## 2024-11-04 RX ADMIN — ENOXAPARIN SODIUM 40 MG: 100 INJECTION SUBCUTANEOUS at 10:19

## 2024-11-04 RX ADMIN — QUETIAPINE FUMARATE 50 MG: 50 TABLET ORAL at 20:38

## 2024-11-04 ASSESSMENT — PAIN SCALES - GENERAL: PAINLEVEL_OUTOF10: 4

## 2024-11-04 NOTE — PROGRESS NOTES
Physical Therapy Med Surg Daily Treatment Note  Facility/Department: [address] ORTHO TELE  [facility]       NAME: Any Moreno  : 1940 (84 y.o.)  MRN: [id number]  CODE STATUS: Full Code    Date of Service: 2024    Patient Diagnosis(es): Pneumonia due to infectious organism [J18.9]  UTI (urinary tract infection) [N39.0]   No chief complaint on file.    There are no problems to display for this patient.       History reviewed. No pertinent past medical history.  No past surgical history on file.    Chart Reviewed: Yes    Restrictions:  Restrictions/Precautions: Fall Risk    SUBJECTIVE:   Subjective: I don't want to put my feet on the ground.    Pain  Pain: 3/10 pre and post session; Pain increased with activity and subsides with rest.     OBJECTIVE:        Bed mobility  Rolling to Left: Minimal assistance  Supine to Sit: Moderate assistance;Maximum assistance  Sit to Supine: Moderate assistance;Maximum assistance  Bed Mobility Comments: Bed flat without use of hand rails; Assist with trunk and BLE. Pt is resistive coming up to sit d/t fear. vc's for reassurance and technique. Good seated balance . Pt c/o increased knee pain in sitting.    Transfers  Comment: Pt declined d/t pain                        PT Exercises  Exercise Treatment: Seated EOB: AP/ LAQ, marching x 10  Dynamic Sitting Balance Exercises: A/P and lateral trunk bending  - x10; small perturbation - vc's for muscle activation.  Postural Correction Exercises: Neck lateral bending and rotation - vc's for upright posture          Activity Tolerance  Activity Tolerance: Patient tolerated treatment well;Patient limited by pain  Activity Tolerance Comments: Fear of falling          ASSESSMENT   Assessment: Pt sat EOB and performed seated exercises. She continues to be fearful and requires encouragement. She performed exercises with improved posture and technique. She declined trials to stand d/t pain.     Discharge Recommendations:  Continue

## 2024-11-04 NOTE — PROGRESS NOTES
Hospitalist Progress Note      PCP: None, None    Date of Admission: 10/27/2024    Chief Complaint:    No chief complaint on file.      Subjective:  Patient denies fevers, chills, sweats, CP, SOB, diarrhea or burning micturition.  Pleasantly confused     Medications:  Reviewed    Infusion Medications    sodium chloride       Scheduled Medications    sodium chloride flush  5-40 mL IntraVENous 2 times per day    enoxaparin  40 mg SubCUTAneous Daily    divalproex  125 mg Oral 2 times per day    QUEtiapine  50 mg Oral Nightly     PRN Meds: sodium chloride flush, sodium chloride, magnesium sulfate, ondansetron **OR** ondansetron, polyethylene glycol, acetaminophen **OR** acetaminophen, QUEtiapine, haloperidol lactate      Intake/Output Summary (Last 24 hours) at 11/4/2024 1315  Last data filed at 11/3/2024 1841  Gross per 24 hour   Intake 180 ml   Output 480 ml   Net -300 ml       Exam:    /75   Pulse 90   Temp 98.1 °F (36.7 °C)   Resp 18   Ht 1.655 m (5' 5.16\")   Wt 57.4 kg (126 lb 9.6 oz)   SpO2 98%   BMI 20.97 kg/m²     General appearance: cachectic  HEENT:  Conjunctivae/corneas clear.  Neck: Trachea midline.  Respiratory:  Normal respiratory effort. Clear to auscultation  Cardiovascular: Regular rate and rhythm  Abdomen: Soft, non-tender, non-distended with normal bowel sounds.  Musculoskeletal: No clubbing, cyanosis or edema bilaterally  Neuro: Non Focal.     Labs:   Recent Labs     11/04/24  0513   WBC 4.8   HGB 11.4*   HCT 35.7*        Recent Labs     11/04/24  0513      K 4.4      CO2 28   BUN 18   CREATININE 0.81   CALCIUM 9.1     No results for input(s): \"AST\", \"ALT\", \"BILIDIR\", \"BILITOT\", \"ALKPHOS\" in the last 72 hours.  No results for input(s): \"INR\" in the last 72 hours.  No results for input(s): \"CKTOTAL\", \"TROPONINI\" in the last 72 hours.    Urinalysis:    No results found for: \"NITRU\", \"WBCUA\", \"BACTERIA\", \"RBCUA\", \"BLOODU\", \"SPECGRAV\", \"GLUCOSEU\"    Radiology:  XR CHEST

## 2024-11-04 NOTE — PROGRESS NOTES
Comprehensive Nutrition Assessment    Type and Reason for Visit:  Initial, LOS    Nutrition Recommendations/Plan:   Continue Current Diet, Start Oral Nutrition Supplement     Malnutrition Assessment:  Malnutrition Status:  Moderate malnutrition (11/04/24 1319)    Context:  Social/Environmental Circumstances     Findings of the 6 clinical characteristics of malnutrition:  Energy Intake:  Mild decrease in energy intake  Weight Loss:  Unable to assess     Body Fat Loss:  Mild body fat loss Orbital, Buccal region   Muscle Mass Loss:  Mild muscle mass loss Temples (temporalis), Clavicles (pectoralis & deltoids)  Fluid Accumulation:  Unable to assess     Strength:  Not Performed    Nutrition Assessment:    pt presents with moderate malnutrition evidenced by inadequate oral intake and as per NFPA. To provide high calorie supplement tid with meals to help meet nutritional needs. To continue to follow.    Nutrition Related Findings:    PMH-HTN, RA, breast cancer, dementia, who was admitted to [facility] on 10/24 with UTI, hospital delirium, failure to thrive, s/p cardiac valve disease s/p replacement; transferred to [facility] on 10/27-'Probable RLL pneumonia / reported UTI' per MD notes. Labs/meds reviewed. No edema noted. Last BM noted 11/3. Wound Type: None       Current Nutrition Intake & Therapies:    Average Meal Intake: 26-50%, 51-75%  Average Supplements Intake: None Ordered  ADULT DIET; Regular    Anthropometric Measures:  Height: 165.5 cm (5' 5.16\")  Ideal Body Weight (IBW): 126 lbs (57 kg)    Admission Body Weight: 57.4 kg (126 lb 8.7 oz)    Current BMI (kg/m2):  21  Usual Body Weight:  (no weght history available)                          BMI Categories: Underweight (BMI less than 22) age over 65    Estimated Daily Nutrient Needs:  Energy Requirements Based On: Kcal/kg  Weight Used for Energy Requirements: Admission  Energy (kcal/day): 2739-6196 (kg x 28-30)  Weight Used for Protein Requirements:

## 2024-11-04 NOTE — PLAN OF CARE
Problem: Discharge Planning  Goal: Discharge to home or other facility with appropriate resources  Outcome: Progressing  Flowsheets (Taken 11/3/2024 2052)  Discharge to home or other facility with appropriate resources: Identify barriers to discharge with patient and caregiver     Problem: ABCDS Injury Assessment  Goal: Absence of physical injury  Outcome: Progressing     Problem: Skin/Tissue Integrity  Goal: Absence of new skin breakdown  Description: 1.  Monitor for areas of redness and/or skin breakdown  2.  Assess vascular access sites hourly  3.  Every 4-6 hours minimum:  Change oxygen saturation probe site  4.  Every 4-6 hours:  If on nasal continuous positive airway pressure, respiratory therapy assess nares and determine need for appliance change or resting period.  11/3/2024 2213 by Patty Whittington RN  Outcome: Progressing  11/3/2024 1628 by Ysabel Duncan RN  Outcome: Progressing     Problem: Confusion  Goal: Confusion, delirium, dementia, or psychosis is improved or at baseline  Description: INTERVENTIONS:  1. Assess for possible contributors to thought disturbance, including medications, impaired vision or hearing, underlying metabolic abnormalities, dehydration, psychiatric diagnoses, and notify attending LIP  2. [place] high risk fall precautions, as indicated  3. Provide frequent short contacts to provide reality reorientation, refocusing and direction  4. Decrease environmental stimuli, including noise as appropriate  5. Monitor and intervene to maintain adequate nutrition, hydration, elimination, sleep and activity  6. If unable to ensure safety without constant attention obtain sitter and review sitter guidelines with assigned personnel  7. Initiate Psychosocial CNS and Spiritual Care consult, as indicated  11/3/2024 2213 by Patty Whittington RN  Outcome: Progressing  11/3/2024 1628 by Ysabel Duncan RN  Outcome: Progressing     Problem: Safety - Adult  Goal: Free from fall injury  11/3/2024 2213

## 2024-11-04 NOTE — CARE COORDINATION
DEANW CALLED TO SPEAK WITH VISHNU AT [facility].  SHE IS NOT AVAILABLE TODAY.  LSW INSISTED THAT THIS CANNOT WAIT UNTIL TOMORROW SO WE NEED A RETURN CALL TODAY.  ALBINO RETURNED THE CALL AND SAID THAT SHE WOULD CHCK WITH JEFFERY.  DEANW SENT THE REFERRAL TO [facility] AS WELL.      We have precert approval for the pt to transfer to [facility] today.  [facility]  at 6pm.  Family aware and will meet pt at the SNF.

## 2024-11-05 NOTE — PROGRESS NOTES
Writer called [phone] to give report on patient. ETA for pickup was 7:30, still pending transport.

## 2024-11-05 NOTE — PROGRESS NOTES
Physical Therapy  [facility]  Physical Therapy Discharge      NAME: Any ROMERO Moreno    : 1940 (84 y.o.)  MRN: [id number]    Account: [id number]  Gender: female      Patient has been discharged from [facility] hospital. DC patient from current PT program.      Electronically signed by Cira Reyes PT on 24 at 11:39 AM EST

## 2024-11-06 ENCOUNTER — NURSING HOME VISIT (OUTPATIENT)
Dept: POST ACUTE CARE | Facility: EXTERNAL LOCATION | Age: 84
End: 2024-11-06
Payer: COMMERCIAL

## 2024-11-06 VITALS
OXYGEN SATURATION: 94 % | HEART RATE: 98 BPM | TEMPERATURE: 97.3 F | DIASTOLIC BLOOD PRESSURE: 60 MMHG | RESPIRATION RATE: 18 BRPM | WEIGHT: 116 LBS | SYSTOLIC BLOOD PRESSURE: 117 MMHG

## 2024-11-06 DIAGNOSIS — G30.1 MODERATE LATE ONSET ALZHEIMER'S DEMENTIA WITHOUT BEHAVIORAL DISTURBANCE, PSYCHOTIC DISTURBANCE, MOOD DISTURBANCE, OR ANXIETY (MULTI): ICD-10-CM

## 2024-11-06 DIAGNOSIS — F02.B0 MODERATE LATE ONSET ALZHEIMER'S DEMENTIA WITHOUT BEHAVIORAL DISTURBANCE, PSYCHOTIC DISTURBANCE, MOOD DISTURBANCE, OR ANXIETY (MULTI): ICD-10-CM

## 2024-11-06 DIAGNOSIS — I10 PRIMARY HYPERTENSION: ICD-10-CM

## 2024-11-06 DIAGNOSIS — T74.01XD: Primary | ICD-10-CM

## 2024-11-06 DIAGNOSIS — R53.1 WEAKNESS: ICD-10-CM

## 2024-11-06 PROBLEM — T74.01XA ADULT NEGLECT: Status: ACTIVE | Noted: 2024-11-06

## 2024-11-06 PROCEDURE — 99310 SBSQ NF CARE HIGH MDM 45: CPT | Performed by: NURSE PRACTITIONER

## 2024-11-06 NOTE — ASSESSMENT & PLAN NOTE
Unable to ambulate for over a year per notes, sits in recliner at home and is left alone freuqently.  She also has deformity of hands (swan neck)  and will have OT evaluate, may need built up utensils
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
## 2024-11-06 NOTE — ASSESSMENT & PLAN NOTE
Oriented times 1 at present.   She was started on seroquel in the hospital in the setting of delirium with hospital admission and UTI.  She is calm and cooperative at present, oriented times 1.   Will continue seroquel nightly for now, may consider trial off once she acclimates to new surroundings
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
## 2024-11-08 ENCOUNTER — NURSING HOME VISIT (OUTPATIENT)
Dept: POST ACUTE CARE | Facility: EXTERNAL LOCATION | Age: 84
End: 2024-11-08
Payer: COMMERCIAL

## 2024-11-08 DIAGNOSIS — G30.1 MODERATE LATE ONSET ALZHEIMER'S DEMENTIA WITHOUT BEHAVIORAL DISTURBANCE, PSYCHOTIC DISTURBANCE, MOOD DISTURBANCE, OR ANXIETY (MULTI): ICD-10-CM

## 2024-11-08 DIAGNOSIS — I10 PRIMARY HYPERTENSION: ICD-10-CM

## 2024-11-08 DIAGNOSIS — T74.01XD: ICD-10-CM

## 2024-11-08 DIAGNOSIS — F02.B0 MODERATE LATE ONSET ALZHEIMER'S DEMENTIA WITHOUT BEHAVIORAL DISTURBANCE, PSYCHOTIC DISTURBANCE, MOOD DISTURBANCE, OR ANXIETY (MULTI): ICD-10-CM

## 2024-11-08 DIAGNOSIS — E11.9 TYPE 2 DIABETES MELLITUS WITHOUT COMPLICATION, WITHOUT LONG-TERM CURRENT USE OF INSULIN (MULTI): ICD-10-CM

## 2024-11-08 DIAGNOSIS — R53.81 PHYSICAL DEBILITY: Primary | ICD-10-CM

## 2024-11-08 DIAGNOSIS — E43 SEVERE PROTEIN-CALORIE MALNUTRITION (MULTI): ICD-10-CM

## 2024-11-08 PROCEDURE — 99306 1ST NF CARE HIGH MDM 50: CPT | Performed by: INTERNAL MEDICINE
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
## 2024-11-12 VITALS
RESPIRATION RATE: 18 BRPM | SYSTOLIC BLOOD PRESSURE: 114 MMHG | OXYGEN SATURATION: 96 % | DIASTOLIC BLOOD PRESSURE: 70 MMHG | HEART RATE: 87 BPM | WEIGHT: 116 LBS
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
## 2024-11-13 ENCOUNTER — NURSING HOME VISIT (OUTPATIENT)
Dept: POST ACUTE CARE | Facility: EXTERNAL LOCATION | Age: 84
End: 2024-11-13
Payer: COMMERCIAL

## 2024-11-13 VITALS
DIASTOLIC BLOOD PRESSURE: 68 MMHG | SYSTOLIC BLOOD PRESSURE: 116 MMHG | TEMPERATURE: 97.1 F | HEART RATE: 76 BPM | OXYGEN SATURATION: 97 % | WEIGHT: 116 LBS | RESPIRATION RATE: 18 BRPM

## 2024-11-13 DIAGNOSIS — F02.B0 MODERATE LATE ONSET ALZHEIMER'S DEMENTIA WITHOUT BEHAVIORAL DISTURBANCE, PSYCHOTIC DISTURBANCE, MOOD DISTURBANCE, OR ANXIETY (MULTI): ICD-10-CM

## 2024-11-13 DIAGNOSIS — R53.1 WEAKNESS: ICD-10-CM

## 2024-11-13 DIAGNOSIS — I10 PRIMARY HYPERTENSION: ICD-10-CM

## 2024-11-13 DIAGNOSIS — E11.9 TYPE 2 DIABETES MELLITUS WITHOUT COMPLICATION, WITHOUT LONG-TERM CURRENT USE OF INSULIN (MULTI): ICD-10-CM

## 2024-11-13 DIAGNOSIS — G30.1 MODERATE LATE ONSET ALZHEIMER'S DEMENTIA WITHOUT BEHAVIORAL DISTURBANCE, PSYCHOTIC DISTURBANCE, MOOD DISTURBANCE, OR ANXIETY (MULTI): ICD-10-CM

## 2024-11-13 DIAGNOSIS — T74.01XD: Primary | ICD-10-CM

## 2024-11-13 PROCEDURE — 99309 SBSQ NF CARE MODERATE MDM 30: CPT | Performed by: NURSE PRACTITIONER
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
## 2024-11-14 PROBLEM — E43 SEVERE PROTEIN-CALORIE MALNUTRITION (MULTI): Status: ACTIVE | Noted: 2024-11-14

## 2024-11-14 PROBLEM — R53.81 PHYSICAL DEBILITY: Status: ACTIVE | Noted: 2024-11-14

## 2024-11-20 ENCOUNTER — NURSING HOME VISIT (OUTPATIENT)
Dept: POST ACUTE CARE | Facility: EXTERNAL LOCATION | Age: 84
End: 2024-11-20
Payer: COMMERCIAL

## 2024-11-20 VITALS
SYSTOLIC BLOOD PRESSURE: 109 MMHG | TEMPERATURE: 97.3 F | HEART RATE: 80 BPM | OXYGEN SATURATION: 98 % | WEIGHT: 112 LBS | DIASTOLIC BLOOD PRESSURE: 75 MMHG | RESPIRATION RATE: 18 BRPM

## 2024-11-20 DIAGNOSIS — E43 SEVERE PROTEIN-CALORIE MALNUTRITION (MULTI): ICD-10-CM

## 2024-11-20 DIAGNOSIS — G30.1 MODERATE LATE ONSET ALZHEIMER'S DEMENTIA WITHOUT BEHAVIORAL DISTURBANCE, PSYCHOTIC DISTURBANCE, MOOD DISTURBANCE, OR ANXIETY (MULTI): Primary | ICD-10-CM

## 2024-11-20 DIAGNOSIS — T74.01XD: ICD-10-CM

## 2024-11-20 DIAGNOSIS — I10 PRIMARY HYPERTENSION: ICD-10-CM

## 2024-11-20 DIAGNOSIS — F02.B0 MODERATE LATE ONSET ALZHEIMER'S DEMENTIA WITHOUT BEHAVIORAL DISTURBANCE, PSYCHOTIC DISTURBANCE, MOOD DISTURBANCE, OR ANXIETY (MULTI): Primary | ICD-10-CM

## 2024-11-20 DIAGNOSIS — E11.9 TYPE 2 DIABETES MELLITUS WITHOUT COMPLICATION, WITHOUT LONG-TERM CURRENT USE OF INSULIN (MULTI): ICD-10-CM

## 2024-11-20 PROCEDURE — 99309 SBSQ NF CARE MODERATE MDM 30: CPT | Performed by: NURSE PRACTITIONER
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
## 2024-11-22 ENCOUNTER — NURSING HOME VISIT (OUTPATIENT)
Dept: POST ACUTE CARE | Facility: EXTERNAL LOCATION | Age: 84
End: 2024-11-22
Payer: COMMERCIAL

## 2024-11-22 DIAGNOSIS — I10 PRIMARY HYPERTENSION: ICD-10-CM

## 2024-11-22 DIAGNOSIS — G30.1 MODERATE LATE ONSET ALZHEIMER'S DEMENTIA WITHOUT BEHAVIORAL DISTURBANCE, PSYCHOTIC DISTURBANCE, MOOD DISTURBANCE, OR ANXIETY (MULTI): ICD-10-CM

## 2024-11-22 DIAGNOSIS — F02.B0 MODERATE LATE ONSET ALZHEIMER'S DEMENTIA WITHOUT BEHAVIORAL DISTURBANCE, PSYCHOTIC DISTURBANCE, MOOD DISTURBANCE, OR ANXIETY (MULTI): ICD-10-CM

## 2024-11-22 DIAGNOSIS — E43 SEVERE PROTEIN-CALORIE MALNUTRITION (MULTI): ICD-10-CM

## 2024-11-22 DIAGNOSIS — I47.29 NSVT (NONSUSTAINED VENTRICULAR TACHYCARDIA) (MULTI): Primary | ICD-10-CM

## 2024-11-22 DIAGNOSIS — R53.81 PHYSICAL DEBILITY: ICD-10-CM

## 2024-11-22 DIAGNOSIS — R53.1 WEAKNESS: ICD-10-CM

## 2024-11-22 DIAGNOSIS — E11.9 TYPE 2 DIABETES MELLITUS WITHOUT COMPLICATION, WITHOUT LONG-TERM CURRENT USE OF INSULIN (MULTI): ICD-10-CM

## 2024-11-22 PROCEDURE — 99308 SBSQ NF CARE LOW MDM 20: CPT | Performed by: INTERNAL MEDICINE

## 2024-11-22 NOTE — LETTER
Patient: Christina Davila  : 1940    Encounter Date: 2024    Subjective  Patient ID: Christina Davila is a 84 y.o. female who is long term resident being seen and evaluated for multiple medical problems.    HPI   This 84-year-old female patient is resting quietly in her room.  She is confused and yelling out per nurses.  The patient herself has no complaints of pain or shortness of breath.    Current high risk medication:  Depakote  Seroquel    Laboratory examination from 2024 been reviewed in detail by me.    Review of Systems   Reason unable to perform ROS: difficult to obtain due to cognitive status, denies any pain when asked.       Objective  /64   Pulse 78   Resp 17   Wt 51.3 kg (113 lb)   SpO2 95%     Physical Exam  Constitutional:       General: She is not in acute distress.     Comments: Frail, thin, cachectic female sitting up in recliner.  Oriented to person   HENT:      Head: Normocephalic and atraumatic.   Eyes:      Conjunctiva/sclera: Conjunctivae normal.   Cardiovascular:      Rate and Rhythm: Normal rate and regular rhythm.   Pulmonary:      Effort: Pulmonary effort is normal. No respiratory distress.      Breath sounds: Normal breath sounds.   Abdominal:      General: Bowel sounds are normal. There is no distension.      Palpations: Abdomen is soft.      Tenderness: There is no abdominal tenderness.   Musculoskeletal:      Right lower leg: No edema.      Left lower leg: No edema.      Comments: [place] neck deformities to bilateral hands.  diffusely decreased muscle mass     Skin:     General: Skin is warm and dry.   Neurological:      General: No focal deficit present.      Mental Status: She is alert.      Comments: Oriented to person   Psychiatric:         Mood and Affect: Mood normal.         Behavior: Behavior normal.         Assessment/Plan  Problem List Items Addressed This Visit             ICD-10-CM    Type 2 diabetes mellitus without complication, without long-term  current use of insulin (Multi) E11.9    Primary hypertension I10    Weakness R53.1    Moderate late onset Alzheimer's dementia without behavioral disturbance, psychotic disturbance, mood disturbance, or anxiety (Multi) G30.1, F02.B0    Physical debility R53.81    Severe protein-calorie malnutrition (Multi) E43    NSVT (nonsustained ventricular tachycardia) (Multi) - Primary I47.29     Appears stable on current medical regimen          8.  We will continue with restorative and supportive care as the patient tolerates    B.  Laboratory examinations will be monitored on an ongoing as-needed basis and should be checked next week    C.  The patient's prognosis is guarded.    Note is made of a 13.4 pound weight loss since November 4, 2024        Electronically Signed By: Daniele Decker MD   12/4/24  5:01 PM

## 2024-11-25 VITALS
RESPIRATION RATE: 17 BRPM | HEART RATE: 78 BPM | SYSTOLIC BLOOD PRESSURE: 109 MMHG | OXYGEN SATURATION: 95 % | WEIGHT: 113 LBS | DIASTOLIC BLOOD PRESSURE: 64 MMHG

## 2024-11-26 NOTE — PROGRESS NOTES
Subjective   Patient ID: Christina Davila is a 84 y.o. female who is long term resident being seen and evaluated for multiple medical problems.    HPI   This 84-year-old female patient is resting quietly in her room.  She is confused and yelling out per nurses.  The patient herself has no complaints of pain or shortness of breath.    Current high risk medication:  Depakote  Seroquel    Laboratory examination from October 2024 been reviewed in detail by me.    Review of Systems   Reason unable to perform ROS: difficult to obtain due to cognitive status, denies any pain when asked.       Objective   /64   Pulse 78   Resp 17   Wt 51.3 kg (113 lb)   SpO2 95%     Physical Exam  Constitutional:       General: She is not in acute distress.     Comments: Frail, thin, cachectic female sitting up in recliner.  Oriented to person   HENT:      Head: Normocephalic and atraumatic.   Eyes:      Conjunctiva/sclera: Conjunctivae normal.   Cardiovascular:      Rate and Rhythm: Normal rate and regular rhythm.   Pulmonary:      Effort: Pulmonary effort is normal. No respiratory distress.      Breath sounds: Normal breath sounds.   Abdominal:      General: Bowel sounds are normal. There is no distension.      Palpations: Abdomen is soft.      Tenderness: There is no abdominal tenderness.   Musculoskeletal:      Right lower leg: No edema.      Left lower leg: No edema.      Comments: [place] neck deformities to bilateral hands.  diffusely decreased muscle mass     Skin:     General: Skin is warm and dry.   Neurological:      General: No focal deficit present.      Mental Status: She is alert.      Comments: Oriented to person   Psychiatric:         Mood and Affect: Mood normal.         Behavior: Behavior normal.         Assessment/Plan   Problem List Items Addressed This Visit             ICD-10-CM    Type 2 diabetes mellitus without complication, without long-term current use of insulin (Multi) E11.9    Primary hypertension I10     Weakness R53.1    Moderate late onset Alzheimer's dementia without behavioral disturbance, psychotic disturbance, mood disturbance, or anxiety (Multi) G30.1, F02.B0    Physical debility R53.81    Severe protein-calorie malnutrition (Multi) E43    NSVT (nonsustained ventricular tachycardia) (Multi) - Primary I47.29     Appears stable on current medical regimen          8.  We will continue with restorative and supportive care as the patient tolerates    B.  Laboratory examinations will be monitored on an ongoing as-needed basis and should be checked next week    C.  The patient's prognosis is guarded.    Note is made of a 13.4 pound weight loss since November 4, 2024

## 2024-11-27 ENCOUNTER — NURSING HOME VISIT (OUTPATIENT)
Dept: POST ACUTE CARE | Facility: EXTERNAL LOCATION | Age: 84
End: 2024-11-27
Payer: COMMERCIAL

## 2024-11-27 VITALS
OXYGEN SATURATION: 97 % | RESPIRATION RATE: 18 BRPM | WEIGHT: 112 LBS | DIASTOLIC BLOOD PRESSURE: 64 MMHG | TEMPERATURE: 97.5 F | SYSTOLIC BLOOD PRESSURE: 126 MMHG | HEART RATE: 72 BPM

## 2024-11-27 DIAGNOSIS — I10 PRIMARY HYPERTENSION: Primary | ICD-10-CM

## 2024-11-27 DIAGNOSIS — T74.01XD: ICD-10-CM

## 2024-11-27 DIAGNOSIS — G30.1 MODERATE LATE ONSET ALZHEIMER'S DEMENTIA WITHOUT BEHAVIORAL DISTURBANCE, PSYCHOTIC DISTURBANCE, MOOD DISTURBANCE, OR ANXIETY (MULTI): ICD-10-CM

## 2024-11-27 DIAGNOSIS — E11.9 TYPE 2 DIABETES MELLITUS WITHOUT COMPLICATION, WITHOUT LONG-TERM CURRENT USE OF INSULIN (MULTI): ICD-10-CM

## 2024-11-27 DIAGNOSIS — F02.B0 MODERATE LATE ONSET ALZHEIMER'S DEMENTIA WITHOUT BEHAVIORAL DISTURBANCE, PSYCHOTIC DISTURBANCE, MOOD DISTURBANCE, OR ANXIETY (MULTI): ICD-10-CM

## 2024-11-27 PROCEDURE — 99309 SBSQ NF CARE MODERATE MDM 30: CPT | Performed by: NURSE PRACTITIONER
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
## 2024-12-04 ENCOUNTER — NURSING HOME VISIT (OUTPATIENT)
Dept: POST ACUTE CARE | Facility: EXTERNAL LOCATION | Age: 84
End: 2024-12-04
Payer: COMMERCIAL

## 2024-12-04 DIAGNOSIS — R53.81 PHYSICAL DEBILITY: ICD-10-CM

## 2024-12-04 DIAGNOSIS — F02.B0 MODERATE LATE ONSET ALZHEIMER'S DEMENTIA WITHOUT BEHAVIORAL DISTURBANCE, PSYCHOTIC DISTURBANCE, MOOD DISTURBANCE, OR ANXIETY (MULTI): ICD-10-CM

## 2024-12-04 DIAGNOSIS — I10 PRIMARY HYPERTENSION: ICD-10-CM

## 2024-12-04 DIAGNOSIS — G30.1 MODERATE LATE ONSET ALZHEIMER'S DEMENTIA WITHOUT BEHAVIORAL DISTURBANCE, PSYCHOTIC DISTURBANCE, MOOD DISTURBANCE, OR ANXIETY (MULTI): ICD-10-CM

## 2024-12-04 DIAGNOSIS — B02.9 HERPES ZOSTER WITHOUT COMPLICATION: ICD-10-CM

## 2024-12-04 DIAGNOSIS — R31.0 GROSS HEMATURIA: Primary | ICD-10-CM

## 2024-12-04 PROBLEM — I47.29 NSVT (NONSUSTAINED VENTRICULAR TACHYCARDIA) (MULTI): Status: ACTIVE | Noted: 2024-12-04

## 2024-12-04 NOTE — LETTER
Patient: Christina Davila  : 1940    Encounter Date: 2024    Subjective  Christina Davila is a 84 y.o. female Here for weekly skilled visit.   HPI   Health problems reviewed.  Participating in therapy as able, limited by cognitive deficits. .  Eating and drinking fair.  Denies any complaints of pain.  She developed gross hematuria stat labs and urine ordered,  UA showed budding yeast and was started on fluconazole.  Urine culture is pending, gross hematuria has resolved.  She also developed shingles to right upper chest and back, started on valtrex, she denies any pain to the area.  Psych ,meds adjusted by psychiatry team. .  Case discussed with multiple disciplines within the facility.       Review of Systems   Reason unable to perform ROS: difficult to obtain due to cognitive status, denies any pain when asked.       Objective  There were no vitals taken for this visit.    Physical Exam  Constitutional:       General: She is not in acute distress.     Comments: Frail, thin, cachectic female sitting up in recliner.  Oriented to person   HENT:      Head: Normocephalic and atraumatic.   Eyes:      Conjunctiva/sclera: Conjunctivae normal.   Cardiovascular:      Rate and Rhythm: Normal rate and regular rhythm.   Pulmonary:      Effort: Pulmonary effort is normal. No respiratory distress.      Breath sounds: Normal breath sounds.   Abdominal:      General: Bowel sounds are normal. There is no distension.      Palpations: Abdomen is soft.      Tenderness: There is no abdominal tenderness.   Musculoskeletal:      Right lower leg: No edema.      Left lower leg: No edema.      Comments: [place] neck deformities to bilateral hands.  diffusely decreased muscle mass     Skin:     General: Skin is warm and dry.      Comments: Vesicular appearing rash to right upper chest and back following dermatomal pattern, vesicles appear to be resolving.  NO sx of secondary infection    Neurological:      General: No focal deficit present.       Mental Status: She is alert.      Comments: Oriented to person   Psychiatric:         Mood and Affect: Mood normal.         Behavior: Behavior normal.         Assessment & Plan  Gross hematuria  She developed gross hematuria, UA showed budding yeast, culture is still pending.  She was started on fluconazole and hematuria has resolved.  She denies any urinary sx when asked.  Await ua and staff to monitor for any recurrence.  No fever or chills.   Herpes zoster without complication  Developed shingles ot right side of chest and right upper back, does not cross midline, started on vlatrex, lesions are appearing to be in early stages of resolution.  staff to monitor and notify for any changes.    Moderate late onset Alzheimer's dementia without behavioral disturbance, psychotic disturbance, mood disturbance, or anxiety (Multi)  Oriented times 1 at present.  On seroquel, depakote and zoloft.  Follows with psychiatry team.    Physical debility  Requires assistance with ADL's.  Unable to live independently  Primary hypertension  Not on meds, continue to monitor and initiate meds based on clinical course    labs/meds/orders reviewed  staff to monitor and notify for any changes.  continue with therapy as able.  Mirtazipine recently started for appetite stimulant  Continue with valtrax and fluconazole as ordered  Await urine culture  Labs reviewed, next full labs 5/25 and prn  SS for discharge planning, she requires 24/7 care at home.          Electronically Signed By: MARY Castaneda   12/7/24  3:01 PM

## 2024-12-04 NOTE — PROGRESS NOTES
Era Davila is a 84 y.o. female Here for weekly skilled visit.   HPI   Health problems reviewed.  Participating in therapy as able, limited by cognitive deficits. .  Eating and drinking fair.  Denies any complaints of pain.  She developed gross hematuria stat labs and urine ordered,  UA showed budding yeast and was started on fluconazole.  Urine culture is pending, gross hematuria has resolved.  She also developed shingles to right upper chest and back, started on valtrex, she denies any pain to the area.  Psych ,meds adjusted by psychiatry team. .  Case discussed with multiple disciplines within the facility.       Review of Systems   Reason unable to perform ROS: difficult to obtain due to cognitive status, denies any pain when asked.       Objective   There were no vitals taken for this visit.    Physical Exam  Constitutional:       General: She is not in acute distress.     Comments: Frail, thin, cachectic female sitting up in recliner.  Oriented to person   HENT:      Head: Normocephalic and atraumatic.   Eyes:      Conjunctiva/sclera: Conjunctivae normal.   Cardiovascular:      Rate and Rhythm: Normal rate and regular rhythm.   Pulmonary:      Effort: Pulmonary effort is normal. No respiratory distress.      Breath sounds: Normal breath sounds.   Abdominal:      General: Bowel sounds are normal. There is no distension.      Palpations: Abdomen is soft.      Tenderness: There is no abdominal tenderness.   Musculoskeletal:      Right lower leg: No edema.      Left lower leg: No edema.      Comments: [place] neck deformities to bilateral hands.  diffusely decreased muscle mass     Skin:     General: Skin is warm and dry.      Comments: Vesicular appearing rash to right upper chest and back following dermatomal pattern, vesicles appear to be resolving.  NO sx of secondary infection    Neurological:      General: No focal deficit present.      Mental Status: She is alert.      Comments: Oriented to person    Psychiatric:         Mood and Affect: Mood normal.         Behavior: Behavior normal.         Assessment & Plan  Gross hematuria  She developed gross hematuria, UA showed budding yeast, culture is still pending.  She was started on fluconazole and hematuria has resolved.  She denies any urinary sx when asked.  Await ua and staff to monitor for any recurrence.  No fever or chills.   Herpes zoster without complication  Developed shingles ot right side of chest and right upper back, does not cross midline, started on vlatrex, lesions are appearing to be in early stages of resolution.  staff to monitor and notify for any changes.    Moderate late onset Alzheimer's dementia without behavioral disturbance, psychotic disturbance, mood disturbance, or anxiety (Multi)  Oriented times 1 at present.  On seroquel, depakote and zoloft.  Follows with psychiatry team.    Physical debility  Requires assistance with ADL's.  Unable to live independently  Primary hypertension  Not on meds, continue to monitor and initiate meds based on clinical course    labs/meds/orders reviewed  staff to monitor and notify for any changes.  continue with therapy as able.  Mirtazipine recently started for appetite stimulant  Continue with valtrax and fluconazole as ordered  Await urine culture  Labs reviewed, next full labs 5/25 and prn  SS for discharge planning, she requires 24/7 care at home.

## 2024-12-07 PROBLEM — R31.0 GROSS HEMATURIA: Status: ACTIVE | Noted: 2024-12-07

## 2024-12-07 PROBLEM — B02.9 HERPES ZOSTER WITHOUT COMPLICATION: Status: ACTIVE | Noted: 2024-12-07

## 2024-12-07 NOTE — ASSESSMENT & PLAN NOTE
Developed shingles ot right side of chest and right upper back, does not cross midline, started on vlatrex, lesions are appearing to be in early stages of resolution.  staff to monitor and notify for any changes.

## 2024-12-07 NOTE — ASSESSMENT & PLAN NOTE
She developed gross hematuria, UA showed budding yeast, culture is still pending.  She was started on fluconazole and hematuria has resolved.  She denies any urinary sx when asked.  Await ua and staff to monitor for any recurrence.  No fever or chills.

## 2025-01-03 ENCOUNTER — NURSING HOME VISIT (OUTPATIENT)
Dept: POST ACUTE CARE | Facility: EXTERNAL LOCATION | Age: 85
End: 2025-01-03
Payer: MEDICAID

## 2025-01-03 DIAGNOSIS — C50.911 MALIGNANT NEOPLASM OF RIGHT FEMALE BREAST, UNSPECIFIED ESTROGEN RECEPTOR STATUS, UNSPECIFIED SITE OF BREAST: ICD-10-CM

## 2025-01-03 DIAGNOSIS — E11.69 COMBINED HYPERLIPIDEMIA ASSOCIATED WITH TYPE 2 DIABETES MELLITUS (MULTI): ICD-10-CM

## 2025-01-03 DIAGNOSIS — E78.2 COMBINED HYPERLIPIDEMIA ASSOCIATED WITH TYPE 2 DIABETES MELLITUS (MULTI): ICD-10-CM

## 2025-01-03 DIAGNOSIS — R63.4 WEIGHT LOSS, UNINTENTIONAL: Primary | ICD-10-CM

## 2025-01-03 DIAGNOSIS — M06.9 RHEUMATOID ARTHRITIS, INVOLVING UNSPECIFIED SITE, UNSPECIFIED WHETHER RHEUMATOID FACTOR PRESENT (MULTI): ICD-10-CM

## 2025-01-03 DIAGNOSIS — F02.B0 MODERATE LATE ONSET ALZHEIMER'S DEMENTIA WITHOUT BEHAVIORAL DISTURBANCE, PSYCHOTIC DISTURBANCE, MOOD DISTURBANCE, OR ANXIETY (MULTI): ICD-10-CM

## 2025-01-03 DIAGNOSIS — E43 SEVERE PROTEIN-CALORIE MALNUTRITION (MULTI): ICD-10-CM

## 2025-01-03 DIAGNOSIS — G30.1 MODERATE LATE ONSET ALZHEIMER'S DEMENTIA WITHOUT BEHAVIORAL DISTURBANCE, PSYCHOTIC DISTURBANCE, MOOD DISTURBANCE, OR ANXIETY (MULTI): ICD-10-CM

## 2025-01-03 DIAGNOSIS — I47.29 NSVT (NONSUSTAINED VENTRICULAR TACHYCARDIA) (MULTI): ICD-10-CM

## 2025-01-03 PROCEDURE — 99309 SBSQ NF CARE MODERATE MDM 30: CPT | Performed by: INTERNAL MEDICINE

## 2025-01-03 NOTE — LETTER
Patient: Christina Davila  : 1940    Encounter Date: 2025    Subjective  Patient ID: Christina Davila is a 84 y.o. female who is long term resident being seen and evaluated for multiple medical problems.    HPI   This 84-year-old female patient is resting quietly in her room in no distress.  Per nursing the patient does yell out frequently.  She has no complaints of pain to me.    Current high risk medication:  Depakote  Atarax  Meclizine  Remeron  Seroquel    Laboratory examination from 2024:  Potassium 4.4  Bicarbonate 26  Creatinine 0.9  Albumin 3.8  Laboratory examination from 2024:  Hemoglobin 11.6  Depakote 43    note is made of a 12.5% or 15.8 pound weight loss since 2024    Review of Systems   Reason unable to perform ROS: difficult to obtain due to cognitive status, denies any pain when asked.       Objective  /62   Pulse 70   Resp 18   Wt 49.9 kg (110 lb)   SpO2 97%     Physical Exam  Constitutional:       General: She is not in acute distress.     Comments: Frail, thin, cachectic female sitting up in recliner.  Oriented to person   HENT:      Head: Normocephalic and atraumatic.   Eyes:      Conjunctiva/sclera: Conjunctivae normal.   Cardiovascular:      Rate and Rhythm: Normal rate and regular rhythm.   Pulmonary:      Effort: Pulmonary effort is normal. No respiratory distress.      Breath sounds: Normal breath sounds.   Abdominal:      General: Bowel sounds are normal. There is no distension.      Palpations: Abdomen is soft.      Tenderness: There is no abdominal tenderness.   Musculoskeletal:      Right lower leg: No edema.      Left lower leg: No edema.      Comments: [place] neck deformities to bilateral hands.  diffusely decreased muscle mass     Skin:     General: Skin is warm and dry.      Comments: Vesicular appearing rash to right upper chest and back following dermatomal pattern, vesicles appear to be resolving.  NO sx of secondary infection     Neurological:      General: No focal deficit present.      Mental Status: She is alert.      Comments: Oriented to person   Psychiatric:         Mood and Affect: Mood normal.         Behavior: Behavior normal.         Assessment/Plan  Problem List Items Addressed This Visit             ICD-10-CM    Moderate late onset Alzheimer's dementia without behavioral disturbance, psychotic disturbance, mood disturbance, or anxiety (Multi) G30.1, F02.B0     The patient is appearing to have moderate to severe Alzheimer's dementia with behavioral disturbance         Severe protein-calorie malnutrition (Multi) E43    NSVT (nonsustained ventricular tachycardia) (Multi) I47.29     Appears to be well-controlled with current medical regimen         Rheumatoid arthritis, involving unspecified site, unspecified whether rheumatoid factor present (Multi) M06.9     The patient very stigmata of rheumatoid arthritis but there is no acute tenosynovitis appreciated         Malignant neoplasm of right female breast, unspecified estrogen receptor status, unspecified site of breast C50.911     Not currently clinically relevant         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  We will continue with restorative and supportive care as the patient tolerance    B.  Laboratory examinations have been ordered now due to the patient's severe weight loss.  Pending her outcome then the patient may be considered an excellent candidate for palliative care and/or hospice at this time should she and her family wish to move in that direction.    C.  The patient's prognosis is poor.      Electronically Signed By: Daniele Decker MD   1/8/25  6:37 PM

## 2025-01-07 VITALS
WEIGHT: 110 LBS | RESPIRATION RATE: 18 BRPM | HEART RATE: 70 BPM | SYSTOLIC BLOOD PRESSURE: 120 MMHG | OXYGEN SATURATION: 97 % | DIASTOLIC BLOOD PRESSURE: 62 MMHG

## 2025-01-07 NOTE — PROGRESS NOTES
Subjective   Patient ID: Christina Davila is a 84 y.o. female who is long term resident being seen and evaluated for multiple medical problems.    HPI   This 84-year-old female patient is resting quietly in her room in no distress.  Per nursing the patient does yell out frequently.  She has no complaints of pain to me.    Current high risk medication:  Depakote  Atarax  Meclizine  Remeron  Seroquel    Laboratory examination from December 1, 2024:  Potassium 4.4  Bicarbonate 26  Creatinine 0.9  Albumin 3.8  Laboratory examination from November 2024:  Hemoglobin 11.6  Depakote 43    note is made of a 12.5% or 15.8 pound weight loss since November 4, 2024    Review of Systems   Reason unable to perform ROS: difficult to obtain due to cognitive status, denies any pain when asked.       Objective   /62   Pulse 70   Resp 18   Wt 49.9 kg (110 lb)   SpO2 97%     Physical Exam  Constitutional:       General: She is not in acute distress.     Comments: Frail, thin, cachectic female sitting up in recliner.  Oriented to person   HENT:      Head: Normocephalic and atraumatic.   Eyes:      Conjunctiva/sclera: Conjunctivae normal.   Cardiovascular:      Rate and Rhythm: Normal rate and regular rhythm.   Pulmonary:      Effort: Pulmonary effort is normal. No respiratory distress.      Breath sounds: Normal breath sounds.   Abdominal:      General: Bowel sounds are normal. There is no distension.      Palpations: Abdomen is soft.      Tenderness: There is no abdominal tenderness.   Musculoskeletal:      Right lower leg: No edema.      Left lower leg: No edema.      Comments: [place] neck deformities to bilateral hands.  diffusely decreased muscle mass     Skin:     General: Skin is warm and dry.      Comments: Vesicular appearing rash to right upper chest and back following dermatomal pattern, vesicles appear to be resolving.  NO sx of secondary infection    Neurological:      General: No focal deficit present.      Mental Status:  She is alert.      Comments: Oriented to person   Psychiatric:         Mood and Affect: Mood normal.         Behavior: Behavior normal.         Assessment/Plan   Problem List Items Addressed This Visit             ICD-10-CM    Moderate late onset Alzheimer's dementia without behavioral disturbance, psychotic disturbance, mood disturbance, or anxiety (Multi) G30.1, F02.B0     The patient is appearing to have moderate to severe Alzheimer's dementia with behavioral disturbance         Severe protein-calorie malnutrition (Multi) E43    NSVT (nonsustained ventricular tachycardia) (Multi) I47.29     Appears to be well-controlled with current medical regimen         Rheumatoid arthritis, involving unspecified site, unspecified whether rheumatoid factor present (Multi) M06.9     The patient very stigmata of rheumatoid arthritis but there is no acute tenosynovitis appreciated         Malignant neoplasm of right female breast, unspecified estrogen receptor status, unspecified site of breast C50.911     Not currently clinically relevant         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  We will continue with restorative and supportive care as the patient tolerance    B.  Laboratory examinations have been ordered now due to the patient's severe weight loss.  Pending her outcome then the patient may be considered an excellent candidate for palliative care and/or hospice at this time should she and her family wish to move in that direction.    C.  The patient's prognosis is poor.

## 2025-01-08 PROBLEM — M06.9 RHEUMATOID ARTHRITIS, INVOLVING UNSPECIFIED SITE, UNSPECIFIED WHETHER RHEUMATOID FACTOR PRESENT (MULTI): Status: ACTIVE | Noted: 2025-01-08

## 2025-01-08 PROBLEM — E78.2 COMBINED HYPERLIPIDEMIA ASSOCIATED WITH TYPE 2 DIABETES MELLITUS (MULTI): Status: ACTIVE | Noted: 2025-01-08

## 2025-01-08 PROBLEM — C50.911 MALIGNANT NEOPLASM OF RIGHT FEMALE BREAST, UNSPECIFIED ESTROGEN RECEPTOR STATUS, UNSPECIFIED SITE OF BREAST: Status: ACTIVE | Noted: 2025-01-08

## 2025-01-08 PROBLEM — R63.4 WEIGHT LOSS, UNINTENTIONAL: Status: ACTIVE | Noted: 2025-01-08

## 2025-01-08 PROBLEM — E11.69 COMBINED HYPERLIPIDEMIA ASSOCIATED WITH TYPE 2 DIABETES MELLITUS (MULTI): Status: ACTIVE | Noted: 2025-01-08

## 2025-01-08 NOTE — ASSESSMENT & PLAN NOTE
Not currently clinically relevant in this patient losing weight rapidly in the setting of his moderately severe dementia

## 2025-01-08 NOTE — ASSESSMENT & PLAN NOTE
The patient is appearing to have moderate to severe Alzheimer's dementia with behavioral disturbance

## 2025-02-07 ENCOUNTER — NURSING HOME VISIT (OUTPATIENT)
Dept: POST ACUTE CARE | Facility: EXTERNAL LOCATION | Age: 85
End: 2025-02-07
Payer: MEDICAID

## 2025-02-07 DIAGNOSIS — M06.9 RHEUMATOID ARTHRITIS, INVOLVING UNSPECIFIED SITE, UNSPECIFIED WHETHER RHEUMATOID FACTOR PRESENT (MULTI): ICD-10-CM

## 2025-02-07 DIAGNOSIS — R53.81 PHYSICAL DEBILITY: ICD-10-CM

## 2025-02-07 DIAGNOSIS — F02.B0 MODERATE LATE ONSET ALZHEIMER'S DEMENTIA WITHOUT BEHAVIORAL DISTURBANCE, PSYCHOTIC DISTURBANCE, MOOD DISTURBANCE, OR ANXIETY (MULTI): ICD-10-CM

## 2025-02-07 DIAGNOSIS — I10 PRIMARY HYPERTENSION: ICD-10-CM

## 2025-02-07 DIAGNOSIS — G30.1 MODERATE LATE ONSET ALZHEIMER'S DEMENTIA WITHOUT BEHAVIORAL DISTURBANCE, PSYCHOTIC DISTURBANCE, MOOD DISTURBANCE, OR ANXIETY (MULTI): ICD-10-CM

## 2025-02-07 DIAGNOSIS — E43 SEVERE PROTEIN-CALORIE MALNUTRITION (MULTI): ICD-10-CM

## 2025-02-07 DIAGNOSIS — R63.4 WEIGHT LOSS, UNINTENTIONAL: Primary | ICD-10-CM

## 2025-02-07 PROCEDURE — 99308 SBSQ NF CARE LOW MDM 20: CPT | Performed by: INTERNAL MEDICINE

## 2025-02-07 NOTE — LETTER
Patient: Christina Davila  : 1940    Encounter Date: 2025    Subjective  Patient ID: Christina Davila is a 84 y.o. female who is long term resident being seen and evaluated for multiple medical problems.    HPI   84-year-old female patient is resting quietly in her bed.  She appears comfortable and nursing has no new adverse events reported to me at this time.    Current medications include:  Ativan  Depakote  Atarax  Meclizine  Mirtazapine  Seroquel    Laboratory examination from 2025 are reviewed in detail and include:  Albumin 2.8  Prealbumin 12  Hemoglobin 11.7  Vitamin B12 normal  Depakote 49    Note is made of a 21 pound weight loss since 2024 and 5 pound weight loss in the past month    Review of Systems   Reason unable to perform ROS: difficult to obtain due to cognitive status, denies any pain when asked.       Objective  /72   Pulse 74   Resp 16   Wt 47.6 kg (105 lb)   SpO2 98%     Physical Exam  Constitutional:       General: She is not in acute distress.     Comments: Frail, thin, cachectic female sitting up in recliner.  Oriented to person   HENT:      Head: Normocephalic and atraumatic.   Eyes:      Conjunctiva/sclera: Conjunctivae normal.   Cardiovascular:      Rate and Rhythm: Normal rate and regular rhythm.   Pulmonary:      Effort: Pulmonary effort is normal. No respiratory distress.      Breath sounds: Normal breath sounds.   Abdominal:      General: Bowel sounds are normal. There is no distension.      Palpations: Abdomen is soft.      Tenderness: There is no abdominal tenderness.   Musculoskeletal:      Right lower leg: No edema.      Left lower leg: No edema.      Comments: [place] neck deformities to bilateral hands.  diffusely decreased muscle mass     Skin:     General: Skin is warm and dry.   Neurological:      General: No focal deficit present.      Mental Status: She is alert.      Comments: Oriented to person   Psychiatric:         Mood and Affect: Mood  normal.         Behavior: Behavior normal.         Assessment/Plan  Problem List Items Addressed This Visit             ICD-10-CM    Primary hypertension I10    Moderate late onset Alzheimer's dementia without behavioral disturbance, psychotic disturbance, mood disturbance, or anxiety (Multi) G30.1, F02.B0    Physical debility R53.81    Severe protein-calorie malnutrition (Multi) E43    Rheumatoid arthritis, involving unspecified site, unspecified whether rheumatoid factor present (Multi) M06.9    Weight loss, unintentional - Primary R63.4     A.  We will continue with restorative and supportive care as the patient tolerates    B.  If the patient's weight loss continues In this manner despite appetite stimulation with mirtazapine then she would be considered an excellent candidate for palliative care and/or hospice should the family wish to move in that direction.    C.  The patient's prognosis is poor.        Electronically Signed By: Daniele Decker MD   2/17/25  5:07 PM

## 2025-02-10 VITALS
DIASTOLIC BLOOD PRESSURE: 72 MMHG | RESPIRATION RATE: 16 BRPM | HEART RATE: 74 BPM | OXYGEN SATURATION: 98 % | WEIGHT: 105 LBS | SYSTOLIC BLOOD PRESSURE: 134 MMHG

## 2025-02-10 NOTE — PROGRESS NOTES
Subjective   Patient ID: Christina Davila is a 84 y.o. female who is long term resident being seen and evaluated for multiple medical problems.    HPI   84-year-old female patient is resting quietly in her bed.  She appears comfortable and nursing has no new adverse events reported to me at this time.    Current medications include:  Ativan  Depakote  Atarax  Meclizine  Mirtazapine  Seroquel    Laboratory examination from January 6, 2025 are reviewed in detail and include:  Albumin 2.8  Prealbumin 12  Hemoglobin 11.7  Vitamin B12 normal  Depakote 49    Note is made of a 21 pound weight loss since November 2024 and 5 pound weight loss in the past month    Review of Systems   Reason unable to perform ROS: difficult to obtain due to cognitive status, denies any pain when asked.       Objective   /72   Pulse 74   Resp 16   Wt 47.6 kg (105 lb)   SpO2 98%     Physical Exam  Constitutional:       General: She is not in acute distress.     Comments: Frail, thin, cachectic female sitting up in recliner.  Oriented to person   HENT:      Head: Normocephalic and atraumatic.   Eyes:      Conjunctiva/sclera: Conjunctivae normal.   Cardiovascular:      Rate and Rhythm: Normal rate and regular rhythm.   Pulmonary:      Effort: Pulmonary effort is normal. No respiratory distress.      Breath sounds: Normal breath sounds.   Abdominal:      General: Bowel sounds are normal. There is no distension.      Palpations: Abdomen is soft.      Tenderness: There is no abdominal tenderness.   Musculoskeletal:      Right lower leg: No edema.      Left lower leg: No edema.      Comments: [place] neck deformities to bilateral hands.  diffusely decreased muscle mass     Skin:     General: Skin is warm and dry.   Neurological:      General: No focal deficit present.      Mental Status: She is alert.      Comments: Oriented to person   Psychiatric:         Mood and Affect: Mood normal.         Behavior: Behavior normal.         Assessment/Plan    Problem List Items Addressed This Visit             ICD-10-CM    Primary hypertension I10    Moderate late onset Alzheimer's dementia without behavioral disturbance, psychotic disturbance, mood disturbance, or anxiety (Multi) G30.1, F02.B0    Physical debility R53.81    Severe protein-calorie malnutrition (Multi) E43    Rheumatoid arthritis, involving unspecified site, unspecified whether rheumatoid factor present (Multi) M06.9    Weight loss, unintentional - Primary R63.4     A.  We will continue with restorative and supportive care as the patient tolerates    B.  If the patient's weight loss continues In this manner despite appetite stimulation with mirtazapine then she would be considered an excellent candidate for palliative care and/or hospice should the family wish to move in that direction.    C.  The patient's prognosis is poor.

## 2025-02-21 ENCOUNTER — NURSING HOME VISIT (OUTPATIENT)
Dept: POST ACUTE CARE | Facility: EXTERNAL LOCATION | Age: 85
End: 2025-02-21
Payer: MEDICAID

## 2025-02-21 VITALS
DIASTOLIC BLOOD PRESSURE: 72 MMHG | OXYGEN SATURATION: 98 % | TEMPERATURE: 98 F | HEART RATE: 74 BPM | SYSTOLIC BLOOD PRESSURE: 134 MMHG | WEIGHT: 108 LBS | RESPIRATION RATE: 16 BRPM

## 2025-02-21 DIAGNOSIS — I10 PRIMARY HYPERTENSION: ICD-10-CM

## 2025-02-21 DIAGNOSIS — R53.81 PHYSICAL DEBILITY: ICD-10-CM

## 2025-02-21 DIAGNOSIS — T74.01XD: Primary | ICD-10-CM

## 2025-02-21 DIAGNOSIS — R63.4 WEIGHT LOSS, UNINTENTIONAL: ICD-10-CM

## 2025-02-21 DIAGNOSIS — F02.B0 MODERATE LATE ONSET ALZHEIMER'S DEMENTIA WITHOUT BEHAVIORAL DISTURBANCE, PSYCHOTIC DISTURBANCE, MOOD DISTURBANCE, OR ANXIETY (MULTI): ICD-10-CM

## 2025-02-21 DIAGNOSIS — G30.1 MODERATE LATE ONSET ALZHEIMER'S DEMENTIA WITHOUT BEHAVIORAL DISTURBANCE, PSYCHOTIC DISTURBANCE, MOOD DISTURBANCE, OR ANXIETY (MULTI): ICD-10-CM

## 2025-02-21 DIAGNOSIS — E11.9 TYPE 2 DIABETES MELLITUS WITHOUT COMPLICATION, WITHOUT LONG-TERM CURRENT USE OF INSULIN (MULTI): ICD-10-CM

## 2025-02-21 PROCEDURE — 99309 SBSQ NF CARE MODERATE MDM 30: CPT | Performed by: NURSE PRACTITIONER
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
## 2025-02-22 NOTE — ASSESSMENT & PLAN NOTE
The patient is appearing to have moderate to severe Alzheimer's dementia with behavioral disturbance, on depakote, seroquel, vistaril.  Sx appear controlled at present

## 2025-03-07 ENCOUNTER — NURSING HOME VISIT (OUTPATIENT)
Dept: POST ACUTE CARE | Facility: EXTERNAL LOCATION | Age: 85
End: 2025-03-07
Payer: MEDICAID

## 2025-03-07 DIAGNOSIS — R53.81 PHYSICAL DEBILITY: ICD-10-CM

## 2025-03-07 DIAGNOSIS — R63.4 WEIGHT LOSS, UNINTENTIONAL: Primary | ICD-10-CM

## 2025-03-07 DIAGNOSIS — E11.9 TYPE 2 DIABETES MELLITUS WITHOUT COMPLICATION, WITHOUT LONG-TERM CURRENT USE OF INSULIN (MULTI): ICD-10-CM

## 2025-03-07 DIAGNOSIS — E43 SEVERE PROTEIN-CALORIE MALNUTRITION (MULTI): ICD-10-CM

## 2025-03-07 DIAGNOSIS — G30.1 MODERATE LATE ONSET ALZHEIMER'S DEMENTIA WITHOUT BEHAVIORAL DISTURBANCE, PSYCHOTIC DISTURBANCE, MOOD DISTURBANCE, OR ANXIETY (MULTI): ICD-10-CM

## 2025-03-07 DIAGNOSIS — F02.B0 MODERATE LATE ONSET ALZHEIMER'S DEMENTIA WITHOUT BEHAVIORAL DISTURBANCE, PSYCHOTIC DISTURBANCE, MOOD DISTURBANCE, OR ANXIETY (MULTI): ICD-10-CM

## 2025-03-07 DIAGNOSIS — I10 PRIMARY HYPERTENSION: ICD-10-CM

## 2025-03-07 PROCEDURE — 99309 SBSQ NF CARE MODERATE MDM 30: CPT | Performed by: INTERNAL MEDICINE

## 2025-03-07 NOTE — LETTER
Patient: Christina Davila  : 1940    Encounter Date: 2025    Subjective  Patient ID: Christina Davila is a 84 y.o. female who is long term resident being seen and evaluated for multiple medical problems.    HPI   84-year-old female patient is resting comfortably in her wheelchair in the common area in no distress whatsoever.  She has no complaints for me at this time.  Nursing has no new adverse events reported to me.    Current high risk medication:  Ativan  Depakote  Meclizine  Mirtazapine  Namenda  Seroquel    Laboratory examination from 2025:  Depakote 49  TSH normal  Albumin 2.8  Liver injury test normal  Hemoglobin 11.4  Prealbumin 12  B12 normal    Review of Systems   Reason unable to perform ROS: difficult to obtain due to cognitive status, denies any pain when asked.       Objective  /72   Pulse 74   Resp 16   Wt 48.1 kg (106 lb)   SpO2 98%     Physical Exam  Constitutional:       General: She is not in acute distress.     Comments: Frail, thin, cachectic female sitting up in recliner.  Oriented to person   HENT:      Head: Normocephalic and atraumatic.   Eyes:      Conjunctiva/sclera: Conjunctivae normal.   Cardiovascular:      Rate and Rhythm: Normal rate and regular rhythm.   Pulmonary:      Effort: Pulmonary effort is normal. No respiratory distress.      Breath sounds: Normal breath sounds.   Abdominal:      General: Bowel sounds are normal. There is no distension.      Palpations: Abdomen is soft.      Tenderness: There is no abdominal tenderness.   Musculoskeletal:      Right lower leg: No edema.      Left lower leg: No edema.      Comments: [place] neck deformities to bilateral hands.  diffusely decreased muscle mass     Skin:     General: Skin is warm and dry.   Neurological:      General: No focal deficit present.      Mental Status: She is alert.      Comments: Oriented to person   Psychiatric:         Mood and Affect: Mood normal.         Behavior: Behavior normal.          Assessment/Plan  Problem List Items Addressed This Visit             ICD-10-CM    Type 2 diabetes mellitus without complication, without long-term current use of insulin (Multi) E11.9    Primary hypertension I10    Moderate late onset Alzheimer's dementia without behavioral disturbance, psychotic disturbance, mood disturbance, or anxiety (Multi) G30.1, F02.B0    Physical debility R53.81    Severe protein-calorie malnutrition (Multi) E43    Weight loss, unintentional - Primary R63.4     A.  We will continue with restorative and supportive care as the patient tolerates    B.  Laboratory examinations should next be due in July 2025 or as needed    C.  The patient appears to be slowly losing weight despite appetite stimulation.  There is no overtly apparent metabolic cause for her weight loss at this time.  Given the degree of the patient's cachexia then should this weight loss continue while taking appetite stimulation then she would certainly be an excellent candidate for palliative care and/or hospice services should she and her family wish to move in that direction    D.  The patient's prognosis is [id number].        Electronically Signed By: Daniele Decker MD   3/12/25  4:54 PM

## 2025-03-10 VITALS
OXYGEN SATURATION: 98 % | RESPIRATION RATE: 16 BRPM | WEIGHT: 106 LBS | DIASTOLIC BLOOD PRESSURE: 72 MMHG | SYSTOLIC BLOOD PRESSURE: 134 MMHG | HEART RATE: 74 BPM

## 2025-03-10 NOTE — PROGRESS NOTES
Subjective   Patient ID: Christina Davila is a 84 y.o. female who is long term resident being seen and evaluated for multiple medical problems.    HPI   84-year-old female patient is resting comfortably in her wheelchair in the common area in no distress whatsoever.  She has no complaints for me at this time.  Nursing has no new adverse events reported to me.    Current high risk medication:  Ativan  Depakote  Meclizine  Mirtazapine  Namenda  Seroquel    Laboratory examination from January 2025:  Depakote 49  TSH normal  Albumin 2.8  Liver injury test normal  Hemoglobin 11.4  Prealbumin 12  B12 normal    Review of Systems   Reason unable to perform ROS: difficult to obtain due to cognitive status, denies any pain when asked.       Objective   /72   Pulse 74   Resp 16   Wt 48.1 kg (106 lb)   SpO2 98%     Physical Exam  Constitutional:       General: She is not in acute distress.     Comments: Frail, thin, cachectic female sitting up in recliner.  Oriented to person   HENT:      Head: Normocephalic and atraumatic.   Eyes:      Conjunctiva/sclera: Conjunctivae normal.   Cardiovascular:      Rate and Rhythm: Normal rate and regular rhythm.   Pulmonary:      Effort: Pulmonary effort is normal. No respiratory distress.      Breath sounds: Normal breath sounds.   Abdominal:      General: Bowel sounds are normal. There is no distension.      Palpations: Abdomen is soft.      Tenderness: There is no abdominal tenderness.   Musculoskeletal:      Right lower leg: No edema.      Left lower leg: No edema.      Comments: [place] neck deformities to bilateral hands.  diffusely decreased muscle mass     Skin:     General: Skin is warm and dry.   Neurological:      General: No focal deficit present.      Mental Status: She is alert.      Comments: Oriented to person   Psychiatric:         Mood and Affect: Mood normal.         Behavior: Behavior normal.         Assessment/Plan   Problem List Items Addressed This Visit              ICD-10-CM    Type 2 diabetes mellitus without complication, without long-term current use of insulin (Multi) E11.9    Primary hypertension I10    Moderate late onset Alzheimer's dementia without behavioral disturbance, psychotic disturbance, mood disturbance, or anxiety (Multi) G30.1, F02.B0    Physical debility R53.81    Severe protein-calorie malnutrition (Multi) E43    Weight loss, unintentional - Primary R63.4     A.  We will continue with restorative and supportive care as the patient tolerates    B.  Laboratory examinations should next be due in July 2025 or as needed    C.  The patient appears to be slowly losing weight despite appetite stimulation.  There is no overtly apparent metabolic cause for her weight loss at this time.  Given the degree of the patient's cachexia then should this weight loss continue while taking appetite stimulation then she would certainly be an excellent candidate for palliative care and/or hospice services should she and her family wish to move in that direction    D.  The patient's prognosis is [id number].

## 2025-04-11 ENCOUNTER — NURSING HOME VISIT (OUTPATIENT)
Dept: POST ACUTE CARE | Facility: EXTERNAL LOCATION | Age: 85
End: 2025-04-11
Payer: MEDICAID

## 2025-04-11 VITALS
DIASTOLIC BLOOD PRESSURE: 74 MMHG | HEART RATE: 70 BPM | SYSTOLIC BLOOD PRESSURE: 128 MMHG | RESPIRATION RATE: 16 BRPM | OXYGEN SATURATION: 97 % | WEIGHT: 107 LBS

## 2025-04-11 DIAGNOSIS — I10 PRIMARY HYPERTENSION: ICD-10-CM

## 2025-04-11 DIAGNOSIS — R63.4 WEIGHT LOSS, UNINTENTIONAL: Primary | ICD-10-CM

## 2025-04-11 DIAGNOSIS — E43 SEVERE PROTEIN-CALORIE MALNUTRITION (MULTI): ICD-10-CM

## 2025-04-11 DIAGNOSIS — R53.81 PHYSICAL DEBILITY: ICD-10-CM

## 2025-04-11 DIAGNOSIS — F02.B0 MODERATE LATE ONSET ALZHEIMER'S DEMENTIA WITHOUT BEHAVIORAL DISTURBANCE, PSYCHOTIC DISTURBANCE, MOOD DISTURBANCE, OR ANXIETY (MULTI): ICD-10-CM

## 2025-04-11 DIAGNOSIS — M06.9 RHEUMATOID ARTHRITIS, INVOLVING UNSPECIFIED SITE, UNSPECIFIED WHETHER RHEUMATOID FACTOR PRESENT (MULTI): ICD-10-CM

## 2025-04-11 DIAGNOSIS — G30.1 MODERATE LATE ONSET ALZHEIMER'S DEMENTIA WITHOUT BEHAVIORAL DISTURBANCE, PSYCHOTIC DISTURBANCE, MOOD DISTURBANCE, OR ANXIETY (MULTI): ICD-10-CM

## 2025-04-11 PROCEDURE — 99308 SBSQ NF CARE LOW MDM 20: CPT | Performed by: INTERNAL MEDICINE

## 2025-04-11 NOTE — LETTER
Patient: Christina Davila  : 1940    Encounter Date: 2025    Subjective  Patient ID: Christina Davila is a 84 y.o. female who is long term resident being seen and evaluated for multiple medical problems.    HPI   84-year-old female patient up in a wheelchair in no distress today.  She has no complaints of pain or shortness of breath nursing has no new adverse events reported to me.    Current high risk medication:  Ativan  Depakote  Meclizine  Mirtazapine  Namenda  Seroquel    Laboratory examination from 2025:  Depakote 49  TSH 4.7  CBC and CMP are unremarkable except for albumin 2.8    Note is made of an 18.8 pound weight loss since 2024    Review of Systems   Reason unable to perform ROS: difficult to obtain due to cognitive status, denies any pain when asked.       Objective  /74   Pulse 70   Resp 16   Wt 48.5 kg (107 lb)   SpO2 97%     Physical Exam  Constitutional:       General: She is not in acute distress.     Comments: Frail, thin, cachectic female sitting up in recliner.  Oriented to person   HENT:      Head: Normocephalic and atraumatic.   Eyes:      Conjunctiva/sclera: Conjunctivae normal.   Cardiovascular:      Rate and Rhythm: Normal rate and regular rhythm.   Pulmonary:      Effort: Pulmonary effort is normal. No respiratory distress.      Breath sounds: Normal breath sounds.   Abdominal:      General: Bowel sounds are normal. There is no distension.      Palpations: Abdomen is soft.      Tenderness: There is no abdominal tenderness.   Musculoskeletal:      Right lower leg: No edema.      Left lower leg: No edema.      Comments: [place] neck deformities to bilateral hands.  diffusely decreased muscle mass     Skin:     General: Skin is warm and dry.   Neurological:      General: No focal deficit present.      Mental Status: She is alert.      Comments: Oriented to person   Psychiatric:         Mood and Affect: Mood normal.         Behavior: Behavior normal.          Assessment/Plan  Problem List Items Addressed This Visit           ICD-10-CM    Primary hypertension I10    Moderate late onset Alzheimer's dementia without behavioral disturbance, psychotic disturbance, mood disturbance, or anxiety (Multi) G30.1, F02.B0    Physical debility R53.81    Severe protein-calorie malnutrition (Multi) E43    Rheumatoid arthritis, involving unspecified site, unspecified whether rheumatoid factor present (Multi) M06.9    Weight loss, unintentional - Primary R63.4     8.  We will continue with restorative and supportive care as the patient tolerates    B.  Laboratory examinations can next be due in July 2025 or as needed    C.  Should the patient's weight continue to decrease on mirtazapine then consideration only family consent for Marinol.  If family does not want to move in that direction or certainly if she continues to lose weight lose weight despite dual appetite stimulation then she would be an excellent candidate for hospice services if the patient and family wish to move in that direction.    D.  The patient's prognosis is poor.        Electronically Signed By: Daniele Decker MD   4/21/25 12:50 PM

## 2025-04-11 NOTE — PROGRESS NOTES
Subjective   Patient ID: Christina Davila is a 84 y.o. female who is long term resident being seen and evaluated for multiple medical problems.    HPI   84-year-old female patient up in a wheelchair in no distress today.  She has no complaints of pain or shortness of breath nursing has no new adverse events reported to me.    Current high risk medication:  Ativan  Depakote  Meclizine  Mirtazapine  Namenda  Seroquel    Laboratory examination from January 2025:  Depakote 49  TSH 4.7  CBC and CMP are unremarkable except for albumin 2.8    Note is made of an 18.8 pound weight loss since November 4, 2024    Review of Systems   Reason unable to perform ROS: difficult to obtain due to cognitive status, denies any pain when asked.       Objective   /74   Pulse 70   Resp 16   Wt 48.5 kg (107 lb)   SpO2 97%     Physical Exam  Constitutional:       General: She is not in acute distress.     Comments: Frail, thin, cachectic female sitting up in recliner.  Oriented to person   HENT:      Head: Normocephalic and atraumatic.   Eyes:      Conjunctiva/sclera: Conjunctivae normal.   Cardiovascular:      Rate and Rhythm: Normal rate and regular rhythm.   Pulmonary:      Effort: Pulmonary effort is normal. No respiratory distress.      Breath sounds: Normal breath sounds.   Abdominal:      General: Bowel sounds are normal. There is no distension.      Palpations: Abdomen is soft.      Tenderness: There is no abdominal tenderness.   Musculoskeletal:      Right lower leg: No edema.      Left lower leg: No edema.      Comments: [place] neck deformities to bilateral hands.  diffusely decreased muscle mass     Skin:     General: Skin is warm and dry.   Neurological:      General: No focal deficit present.      Mental Status: She is alert.      Comments: Oriented to person   Psychiatric:         Mood and Affect: Mood normal.         Behavior: Behavior normal.         Assessment/Plan   Problem List Items Addressed This Visit            ICD-10-CM    Primary hypertension I10    Moderate late onset Alzheimer's dementia without behavioral disturbance, psychotic disturbance, mood disturbance, or anxiety (Multi) G30.1, F02.B0    Physical debility R53.81    Severe protein-calorie malnutrition (Multi) E43    Rheumatoid arthritis, involving unspecified site, unspecified whether rheumatoid factor present (Multi) M06.9    Weight loss, unintentional - Primary R63.4     8.  We will continue with restorative and supportive care as the patient tolerates    B.  Laboratory examinations can next be due in July 2025 or as needed    C.  Should the patient's weight continue to decrease on mirtazapine then consideration only family consent for Marinol.  If family does not want to move in that direction or certainly if she continues to lose weight lose weight despite dual appetite stimulation then she would be an excellent candidate for hospice services if the patient and family wish to move in that direction.    D.  The patient's prognosis is poor.

## 2025-04-30 ENCOUNTER — NURSING HOME VISIT (OUTPATIENT)
Dept: POST ACUTE CARE | Facility: EXTERNAL LOCATION | Age: 85
End: 2025-04-30
Payer: MEDICAID

## 2025-04-30 VITALS
OXYGEN SATURATION: 97 % | WEIGHT: 107 LBS | TEMPERATURE: 98.2 F | DIASTOLIC BLOOD PRESSURE: 74 MMHG | SYSTOLIC BLOOD PRESSURE: 128 MMHG | RESPIRATION RATE: 16 BRPM | HEART RATE: 70 BPM

## 2025-04-30 DIAGNOSIS — R53.81 PHYSICAL DEBILITY: ICD-10-CM

## 2025-04-30 DIAGNOSIS — E11.9 TYPE 2 DIABETES MELLITUS WITHOUT COMPLICATION, WITHOUT LONG-TERM CURRENT USE OF INSULIN: ICD-10-CM

## 2025-04-30 DIAGNOSIS — F02.B0 MODERATE LATE ONSET ALZHEIMER'S DEMENTIA WITHOUT BEHAVIORAL DISTURBANCE, PSYCHOTIC DISTURBANCE, MOOD DISTURBANCE, OR ANXIETY (MULTI): Primary | ICD-10-CM

## 2025-04-30 DIAGNOSIS — I10 PRIMARY HYPERTENSION: ICD-10-CM

## 2025-04-30 DIAGNOSIS — G30.1 MODERATE LATE ONSET ALZHEIMER'S DEMENTIA WITHOUT BEHAVIORAL DISTURBANCE, PSYCHOTIC DISTURBANCE, MOOD DISTURBANCE, OR ANXIETY (MULTI): Primary | ICD-10-CM

## 2025-04-30 PROCEDURE — 99309 SBSQ NF CARE MODERATE MDM 30: CPT | Performed by: NURSE PRACTITIONER

## 2025-04-30 NOTE — PROGRESS NOTES
Era Davila is a 84 y.o. female Here for routine monthly visit.   HPI  There are no new problems and multiple health problems have been reviewed.  The course has been unchanged.  The patient  is severely confused.   She appears comfortable.   There are no family members present during time of visit.   She is resting in bed , denies any complaints when asked.  Eating and drinking better, weight has been stable the last few months.   No concerns per staff.    Review of Systems   Reason unable to perform ROS: difficult to obtain due to cognitive status, denies any pain when asked.       Objective   /74   Pulse 70   Temp 36.8 °C (98.2 °F)   Resp 16   Wt 48.5 kg (107 lb)   SpO2 97%     Physical Exam  Constitutional:       General: She is not in acute distress.     Comments: Frail, thin, cachectic female sitting up in recliner.  Oriented to person   HENT:      Head: Normocephalic and atraumatic.   Eyes:      Conjunctiva/sclera: Conjunctivae normal.   Cardiovascular:      Rate and Rhythm: Normal rate and regular rhythm.   Pulmonary:      Effort: Pulmonary effort is normal. No respiratory distress.      Breath sounds: Normal breath sounds.   Abdominal:      General: Bowel sounds are normal. There is no distension.      Palpations: Abdomen is soft.      Tenderness: There is no abdominal tenderness.   Musculoskeletal:      Right lower leg: No edema.      Left lower leg: No edema.      Comments: [place] neck deformities to bilateral hands.  diffusely decreased muscle mass     Skin:     General: Skin is warm and dry.      Comments: Vesicular appearing rash to right upper chest and back following dermatomal pattern, vesicles appear to be resolving.  NO sx of secondary infection    Neurological:      General: No focal deficit present.      Mental Status: She is alert.      Comments: Oriented to person   Psychiatric:         Mood and Affect: Mood normal.         Behavior: Behavior normal.         Assessment &  Plan  Moderate late onset Alzheimer's dementia without behavioral disturbance, psychotic disturbance, mood disturbance, or anxiety (Multi)  The patient is appearing to have moderate to severe Alzheimer's dementia with behavioral disturbance, on depakote, seroquel, vistaril.  Sx appear controlled at present  Physical debility  Requires assistance with ADL's.  Unable to live independently  Primary hypertension  Not on meds, continue to monitor and initiate meds based on clinical course  Type 2 diabetes mellitus without complication, without long-term current use of insulin  Not on meds    labs/meds/orders reviewed  staff to monitor and notify for any changes.  continue with therapy as able.  Labs reviewed, next full labs 7/25 and prn  she requires 24/7 care at home.

## 2025-04-30 NOTE — LETTER
Patient: Christina Davila  : 1940    Encounter Date: 2025    Subjective  Christina Davila is a 84 y.o. female Here for routine monthly visit.   HPI  There are no new problems and multiple health problems have been reviewed.  The course has been unchanged.  The patient  is severely confused.   She appears comfortable.   There are no family members present during time of visit.   She is resting in bed , denies any complaints when asked.  Eating and drinking better, weight has been stable the last few months.   No concerns per staff.    Review of Systems   Reason unable to perform ROS: difficult to obtain due to cognitive status, denies any pain when asked.       Objective  /74   Pulse 70   Temp 36.8 °C (98.2 °F)   Resp 16   Wt 48.5 kg (107 lb)   SpO2 97%     Physical Exam  Constitutional:       General: She is not in acute distress.     Comments: Frail, thin, cachectic female sitting up in recliner.  Oriented to person   HENT:      Head: Normocephalic and atraumatic.   Eyes:      Conjunctiva/sclera: Conjunctivae normal.   Cardiovascular:      Rate and Rhythm: Normal rate and regular rhythm.   Pulmonary:      Effort: Pulmonary effort is normal. No respiratory distress.      Breath sounds: Normal breath sounds.   Abdominal:      General: Bowel sounds are normal. There is no distension.      Palpations: Abdomen is soft.      Tenderness: There is no abdominal tenderness.   Musculoskeletal:      Right lower leg: No edema.      Left lower leg: No edema.      Comments: [place] neck deformities to bilateral hands.  diffusely decreased muscle mass     Skin:     General: Skin is warm and dry.      Comments: Vesicular appearing rash to right upper chest and back following dermatomal pattern, vesicles appear to be resolving.  NO sx of secondary infection    Neurological:      General: No focal deficit present.      Mental Status: She is alert.      Comments: Oriented to person   Psychiatric:         Mood and Affect:  Mood normal.         Behavior: Behavior normal.         Assessment & Plan  Moderate late onset Alzheimer's dementia without behavioral disturbance, psychotic disturbance, mood disturbance, or anxiety (Multi)  The patient is appearing to have moderate to severe Alzheimer's dementia with behavioral disturbance, on depakote, seroquel, vistaril.  Sx appear controlled at present  Physical debility  Requires assistance with ADL's.  Unable to live independently  Primary hypertension  Not on meds, continue to monitor and initiate meds based on clinical course  Type 2 diabetes mellitus without complication, without long-term current use of insulin  Not on meds    labs/meds/orders reviewed  staff to monitor and notify for any changes.  continue with therapy as able.  Labs reviewed, next full labs 7/25 and prn  she requires 24/7 care at home.          Electronically Signed By: MARY Castaneda   5/3/25  8:52 PM

## 2025-05-02 ENCOUNTER — NURSING HOME VISIT (OUTPATIENT)
Dept: POST ACUTE CARE | Facility: EXTERNAL LOCATION | Age: 85
End: 2025-05-02
Payer: MEDICAID

## 2025-05-02 DIAGNOSIS — E43 SEVERE PROTEIN-CALORIE MALNUTRITION (MULTI): ICD-10-CM

## 2025-05-02 DIAGNOSIS — M06.9 RHEUMATOID ARTHRITIS, INVOLVING UNSPECIFIED SITE, UNSPECIFIED WHETHER RHEUMATOID FACTOR PRESENT (MULTI): ICD-10-CM

## 2025-05-02 DIAGNOSIS — E11.9 TYPE 2 DIABETES MELLITUS WITHOUT COMPLICATION, WITHOUT LONG-TERM CURRENT USE OF INSULIN: ICD-10-CM

## 2025-05-02 DIAGNOSIS — G30.1 MODERATE LATE ONSET ALZHEIMER'S DEMENTIA WITHOUT BEHAVIORAL DISTURBANCE, PSYCHOTIC DISTURBANCE, MOOD DISTURBANCE, OR ANXIETY (MULTI): ICD-10-CM

## 2025-05-02 DIAGNOSIS — R63.4 WEIGHT LOSS, UNINTENTIONAL: Primary | ICD-10-CM

## 2025-05-02 DIAGNOSIS — F02.B0 MODERATE LATE ONSET ALZHEIMER'S DEMENTIA WITHOUT BEHAVIORAL DISTURBANCE, PSYCHOTIC DISTURBANCE, MOOD DISTURBANCE, OR ANXIETY (MULTI): ICD-10-CM

## 2025-05-02 PROCEDURE — 99308 SBSQ NF CARE LOW MDM 20: CPT | Performed by: INTERNAL MEDICINE

## 2025-05-02 NOTE — LETTER
Patient: Christina Davila  : 1940    Encounter Date: 2025    Subjective  Patient ID: Christina Davila is a 84 y.o. female who is long term resident being seen and evaluated for multiple medical problems.    HPI   This 84-year-old female patient is resting comfortably in no distress.  She has no complaints f for me albeit she is quite confused.  Nursing has no new adverse events reports me.    Current high risk medication:  Ativan  Depakote  Namenda  Vivek Adipine    Laboratory examination from 2025 are unremarkable except for albumin 2.8    Note is made of an 19 pound weight loss since 2024 with a current weight of 107 pounds    Review of Systems   Reason unable to perform ROS: difficult to obtain due to cognitive status, denies any pain when asked.       Objective  /74   Pulse 70   Resp 16   Wt 48.5 kg (107 lb)   SpO2 97%     Physical Exam  Constitutional:       General: She is not in acute distress.     Comments: Frail, thin, cachectic female sitting up in recliner.  Oriented to person   HENT:      Head: Normocephalic and atraumatic.   Eyes:      Conjunctiva/sclera: Conjunctivae normal.   Cardiovascular:      Rate and Rhythm: Normal rate and regular rhythm.   Pulmonary:      Effort: Pulmonary effort is normal. No respiratory distress.      Breath sounds: Normal breath sounds.   Abdominal:      General: Bowel sounds are normal. There is no distension.      Palpations: Abdomen is soft.      Tenderness: There is no abdominal tenderness.   Musculoskeletal:      Right lower leg: No edema.      Left lower leg: No edema.      Comments: [place] neck deformities to bilateral hands.  diffusely decreased muscle mass     Skin:     General: Skin is warm and dry.   Neurological:      General: No focal deficit present.      Mental Status: She is alert.      Comments: Oriented to person   Psychiatric:         Mood and Affect: Mood normal.         Behavior: Behavior normal.          Assessment/Plan  Problem List Items Addressed This Visit           ICD-10-CM    Type 2 diabetes mellitus without complication, without long-term current use of insulin E11.9    Moderate late onset Alzheimer's dementia without behavioral disturbance, psychotic disturbance, mood disturbance, or anxiety (Multi) G30.1, F02.B0    Severe protein-calorie malnutrition (Multi) E43    Rheumatoid arthritis, involving unspecified site, unspecified whether rheumatoid factor present (Multi) M06.9    Weight loss, unintentional - Primary R63.4     A.  We will continue with restorative and supportive care as the patient tolerates    B.  The patient's weight remains a problem and quite low.    C.  The patient's current condition should be considered an excellent candidate for palliative care and/or hospice should she and her family wish to move in that direction    D.  The patient's prognosis is guarded-treat as poor.        Electronically Signed By: Daniele Decker MD   5/8/25  4:29 PM

## 2025-05-03 VITALS
OXYGEN SATURATION: 97 % | WEIGHT: 107 LBS | RESPIRATION RATE: 16 BRPM | DIASTOLIC BLOOD PRESSURE: 74 MMHG | SYSTOLIC BLOOD PRESSURE: 128 MMHG | HEART RATE: 70 BPM

## 2025-05-03 NOTE — PROGRESS NOTES
Subjective   Patient ID: Christina Davila is a 84 y.o. female who is long term resident being seen and evaluated for multiple medical problems.    HPI   This 84-year-old female patient is resting comfortably in no distress.  She has no complaints f for me albeit she is quite confused.  Nursing has no new adverse events reports me.    Current high risk medication:  Ativan  Depakote  Namenda  Vivek Adipine    Laboratory examination from January 2025 are unremarkable except for albumin 2.8    Note is made of an 19 pound weight loss since November 4, 2024 with a current weight of 107 pounds    Review of Systems   Reason unable to perform ROS: difficult to obtain due to cognitive status, denies any pain when asked.       Objective   /74   Pulse 70   Resp 16   Wt 48.5 kg (107 lb)   SpO2 97%     Physical Exam  Constitutional:       General: She is not in acute distress.     Comments: Frail, thin, cachectic female sitting up in recliner.  Oriented to person   HENT:      Head: Normocephalic and atraumatic.   Eyes:      Conjunctiva/sclera: Conjunctivae normal.   Cardiovascular:      Rate and Rhythm: Normal rate and regular rhythm.   Pulmonary:      Effort: Pulmonary effort is normal. No respiratory distress.      Breath sounds: Normal breath sounds.   Abdominal:      General: Bowel sounds are normal. There is no distension.      Palpations: Abdomen is soft.      Tenderness: There is no abdominal tenderness.   Musculoskeletal:      Right lower leg: No edema.      Left lower leg: No edema.      Comments: [place] neck deformities to bilateral hands.  diffusely decreased muscle mass     Skin:     General: Skin is warm and dry.   Neurological:      General: No focal deficit present.      Mental Status: She is alert.      Comments: Oriented to person   Psychiatric:         Mood and Affect: Mood normal.         Behavior: Behavior normal.         Assessment/Plan   Problem List Items Addressed This Visit           ICD-10-CM    Type 2  diabetes mellitus without complication, without long-term current use of insulin E11.9    Moderate late onset Alzheimer's dementia without behavioral disturbance, psychotic disturbance, mood disturbance, or anxiety (Multi) G30.1, F02.B0    Severe protein-calorie malnutrition (Multi) E43    Rheumatoid arthritis, involving unspecified site, unspecified whether rheumatoid factor present (Multi) M06.9    Weight loss, unintentional - Primary R63.4     A.  We will continue with restorative and supportive care as the patient tolerates    B.  The patient's weight remains a problem and quite low.    C.  The patient's current condition should be considered an excellent candidate for palliative care and/or hospice should she and her family wish to move in that direction    D.  The patient's prognosis is guarded-treat as poor.

## 2025-05-04 NOTE — ASSESSMENT & PLAN NOTE
Caller:  [facility]    Relationship: REFERRED TO PROVIDER    What is your medical concern? INCOMING CALL FROM  [facility], SAYS PT HAS  PRIME AND IS GOING TO NEED A PRIOR AUTHORIZATION.       Not on meds, continue to monitor and initiate meds based on clinical course

## 2025-06-05 ENCOUNTER — NURSING HOME VISIT (OUTPATIENT)
Dept: POST ACUTE CARE | Facility: EXTERNAL LOCATION | Age: 85
End: 2025-06-05
Payer: MEDICAID

## 2025-06-05 DIAGNOSIS — G30.1 MODERATE LATE ONSET ALZHEIMER'S DEMENTIA WITHOUT BEHAVIORAL DISTURBANCE, PSYCHOTIC DISTURBANCE, MOOD DISTURBANCE, OR ANXIETY (MULTI): ICD-10-CM

## 2025-06-05 DIAGNOSIS — I10 PRIMARY HYPERTENSION: Primary | ICD-10-CM

## 2025-06-05 DIAGNOSIS — E78.49 OTHER HYPERLIPIDEMIA: ICD-10-CM

## 2025-06-05 DIAGNOSIS — E43 SEVERE PROTEIN-CALORIE MALNUTRITION (MULTI): ICD-10-CM

## 2025-06-05 DIAGNOSIS — I47.29 NSVT (NONSUSTAINED VENTRICULAR TACHYCARDIA) (MULTI): ICD-10-CM

## 2025-06-05 DIAGNOSIS — E11.9 TYPE 2 DIABETES MELLITUS WITHOUT COMPLICATION, WITHOUT LONG-TERM CURRENT USE OF INSULIN: ICD-10-CM

## 2025-06-05 DIAGNOSIS — F02.B0 MODERATE LATE ONSET ALZHEIMER'S DEMENTIA WITHOUT BEHAVIORAL DISTURBANCE, PSYCHOTIC DISTURBANCE, MOOD DISTURBANCE, OR ANXIETY (MULTI): ICD-10-CM

## 2025-06-05 PROCEDURE — 99308 SBSQ NF CARE LOW MDM 20: CPT | Performed by: INTERNAL MEDICINE

## 2025-06-05 NOTE — PROGRESS NOTES
Patient is seen at [facility] today.  Patient is comfortable.  Unable to answer questions appropriately.  Patient is confused.  Has no fever or chill.  No respiratory distress.  Has no other new symptom.  According to nursing staff there is no no other new concern.    Past medical history:  Hypertension  Hyperlipidemia  Diabetes mellitus  Failure to thrive  Rheumatoid arthritis  Presence of prosthetic heart valve  Nonsustained ventricular tachycardia   Moderate dementia  Protein calorie malnutrition    On examination:  General examination: Comfortable  Vital signs: Heart rate is 80/min, respiratory rate is 16,  Eyes: No pallor.  Pupils are equal  Neck: There is no JVD  CVS: Heart sounds are regular there is no murmur  Lungs: Clear to auscultation  Abdomen: Soft there is no tenderness  CNS: Awake alert moving all the extremity.  Psychological: Confused.  Normal behavior  Legs: No edema    Assessment and plan:  1.  Hypertension: Readings are acceptable without any medication  2.  Physical disability: Continue supportive care  3.  Diabetes: Blood sugar levels are acceptable.  She had she is not on any medication  4.  Dementia: Patient is on Namenda  5.  Failure to thrive: Continue supportive care and nutritional supplements.  6.  History of nonsustained VT: Patient is in sinus rhythm currently

## 2025-06-05 NOTE — LETTER
Patient: Christina Davila  : 1940    Encounter Date: 2025    Patient is seen at [facility] today.  Patient is comfortable.  Unable to answer questions appropriately.  Patient is confused.  Has no fever or chill.  No respiratory distress.  Has no other new symptom.  According to nursing staff there is no no other new concern.    Past medical history:  Hypertension  Hyperlipidemia  Diabetes mellitus  Failure to thrive  Rheumatoid arthritis  Presence of prosthetic heart valve  Nonsustained ventricular tachycardia   Moderate dementia  Protein calorie malnutrition    On examination:  General examination: Comfortable  Vital signs: Heart rate is 80/min, respiratory rate is 16,  Eyes: No pallor.  Pupils are equal  Neck: There is no JVD  CVS: Heart sounds are regular there is no murmur  Lungs: Clear to auscultation  Abdomen: Soft there is no tenderness  CNS: Awake alert moving all the extremity.  Psychological: Confused.  Normal behavior  Legs: No edema    Assessment and plan:  1.  Hypertension: Readings are acceptable without any medication  2.  Physical disability: Continue supportive care  3.  Diabetes: Blood sugar levels are acceptable.  She had she is not on any medication  4.  Dementia: Patient is on Namenda  5.  Failure to thrive: Continue supportive care and nutritional supplements.  6.  History of nonsustained VT: Patient is in sinus rhythm currently    Electronically Signed By: Della Yancey MD   25  4:06 PM

## 2025-06-10 ENCOUNTER — NURSING HOME VISIT (OUTPATIENT)
Dept: POST ACUTE CARE | Facility: EXTERNAL LOCATION | Age: 85
End: 2025-06-10
Payer: MEDICAID

## 2025-06-10 VITALS
DIASTOLIC BLOOD PRESSURE: 76 MMHG | OXYGEN SATURATION: 99 % | HEART RATE: 92 BPM | SYSTOLIC BLOOD PRESSURE: 124 MMHG | RESPIRATION RATE: 18 BRPM | WEIGHT: 111 LBS | TEMPERATURE: 97.3 F

## 2025-06-10 DIAGNOSIS — E43 SEVERE PROTEIN-CALORIE MALNUTRITION (MULTI): ICD-10-CM

## 2025-06-10 DIAGNOSIS — F02.B0 MODERATE LATE ONSET ALZHEIMER'S DEMENTIA WITHOUT BEHAVIORAL DISTURBANCE, PSYCHOTIC DISTURBANCE, MOOD DISTURBANCE, OR ANXIETY (MULTI): Primary | ICD-10-CM

## 2025-06-10 DIAGNOSIS — R53.81 PHYSICAL DEBILITY: ICD-10-CM

## 2025-06-10 DIAGNOSIS — E11.9 TYPE 2 DIABETES MELLITUS WITHOUT COMPLICATION, WITHOUT LONG-TERM CURRENT USE OF INSULIN: ICD-10-CM

## 2025-06-10 DIAGNOSIS — G30.1 MODERATE LATE ONSET ALZHEIMER'S DEMENTIA WITHOUT BEHAVIORAL DISTURBANCE, PSYCHOTIC DISTURBANCE, MOOD DISTURBANCE, OR ANXIETY (MULTI): Primary | ICD-10-CM

## 2025-06-10 DIAGNOSIS — M06.9 RHEUMATOID ARTHRITIS, INVOLVING UNSPECIFIED SITE, UNSPECIFIED WHETHER RHEUMATOID FACTOR PRESENT (MULTI): ICD-10-CM

## 2025-06-10 DIAGNOSIS — I10 PRIMARY HYPERTENSION: ICD-10-CM

## 2025-06-10 PROCEDURE — 99309 SBSQ NF CARE MODERATE MDM 30: CPT | Performed by: NURSE PRACTITIONER

## 2025-06-10 NOTE — LETTER
Patient: Christina Davila  : 1940    Encounter Date: 06/10/2025    Subjective  Christina Davila is a 84 y.o. female Here for routine monthly visit.   HPI  There are no new problems and multiple health problems have been reviewed.  The course has been unchanged.  The patient  is severely confused.   She appears comfortable.   There are no family members present during time of visit.   She is sitting up in chair, denies any complaints when asked.  Eating and drinking better, weight has been stable the last few months.   No concerns per staff.    Review of Systems   Reason unable to perform ROS: difficult to obtain due to cognitive status, denies any pain when asked.       Objective  /76   Pulse 92   Temp 36.3 °C (97.3 °F)   Resp 18   Wt 50.3 kg (111 lb)   SpO2 99%     Physical Exam  Constitutional:       General: She is not in acute distress.     Comments: Frail, thin, cachectic female sitting up in chair.  Oriented to person   HENT:      Head: Normocephalic and atraumatic.   Eyes:      Conjunctiva/sclera: Conjunctivae normal.   Cardiovascular:      Rate and Rhythm: Normal rate and regular rhythm.   Pulmonary:      Effort: Pulmonary effort is normal. No respiratory distress.      Breath sounds: Normal breath sounds.   Abdominal:      General: Bowel sounds are normal. There is no distension.      Palpations: Abdomen is soft.      Tenderness: There is no abdominal tenderness.   Musculoskeletal:      Right lower leg: No edema.      Left lower leg: No edema.      Comments: [place] neck deformities to bilateral hands.  diffusely decreased muscle mass     Skin:     General: Skin is warm and dry.   Neurological:      General: No focal deficit present.      Mental Status: She is alert.      Comments: Oriented to person   Psychiatric:         Mood and Affect: Mood normal.         Behavior: Behavior normal.         Assessment & Plan  Moderate late onset Alzheimer's dementia without behavioral disturbance, psychotic  disturbance, mood disturbance, or anxiety (Multi)  The patient is appearing to have moderate to severe Alzheimer's dementia with behavioral disturbance, on depakote, seroquel, vistaril.  Sx appear controlled at present  Physical debility  Requires assistance with ADL's.  Unable to live independently  Primary hypertension  continue with current medical management    Rheumatoid arthritis, involving unspecified site, unspecified whether rheumatoid factor present (Multi)  Chronic changes.  Severe protein-calorie malnutrition (Multi)  Dietician to evaluate for supplements.   Type 2 diabetes mellitus without complication, without long-term current use of insulin  Not on meds    labs/meds/orders reviewed  staff to monitor and notify for any changes.  Labs reviewed, next full labs 7/25 and prn  she requires 24/7 care at home.          Electronically Signed By: MARY Castaneda   6/10/25  4:04 PM

## 2025-06-10 NOTE — PROGRESS NOTES
Era Davila is a 84 y.o. female Here for routine monthly visit.   HPI  There are no new problems and multiple health problems have been reviewed.  The course has been unchanged.  The patient  is severely confused.   She appears comfortable.   There are no family members present during time of visit.   She is sitting up in chair, denies any complaints when asked.  Eating and drinking better, weight has been stable the last few months.   No concerns per staff.    Review of Systems   Reason unable to perform ROS: difficult to obtain due to cognitive status, denies any pain when asked.       Objective   /76   Pulse 92   Temp 36.3 °C (97.3 °F)   Resp 18   Wt 50.3 kg (111 lb)   SpO2 99%     Physical Exam  Constitutional:       General: She is not in acute distress.     Comments: Frail, thin, cachectic female sitting up in chair.  Oriented to person   HENT:      Head: Normocephalic and atraumatic.   Eyes:      Conjunctiva/sclera: Conjunctivae normal.   Cardiovascular:      Rate and Rhythm: Normal rate and regular rhythm.   Pulmonary:      Effort: Pulmonary effort is normal. No respiratory distress.      Breath sounds: Normal breath sounds.   Abdominal:      General: Bowel sounds are normal. There is no distension.      Palpations: Abdomen is soft.      Tenderness: There is no abdominal tenderness.   Musculoskeletal:      Right lower leg: No edema.      Left lower leg: No edema.      Comments: [place] neck deformities to bilateral hands.  diffusely decreased muscle mass     Skin:     General: Skin is warm and dry.   Neurological:      General: No focal deficit present.      Mental Status: She is alert.      Comments: Oriented to person   Psychiatric:         Mood and Affect: Mood normal.         Behavior: Behavior normal.         Assessment & Plan  Moderate late onset Alzheimer's dementia without behavioral disturbance, psychotic disturbance, mood disturbance, or anxiety (Multi)  The patient is  appearing to have moderate to severe Alzheimer's dementia with behavioral disturbance, on depakote, seroquel, vistaril.  Sx appear controlled at present  Physical debility  Requires assistance with ADL's.  Unable to live independently  Primary hypertension  continue with current medical management    Rheumatoid arthritis, involving unspecified site, unspecified whether rheumatoid factor present (Multi)  Chronic changes.  Severe protein-calorie malnutrition (Multi)  Dietician to evaluate for supplements.   Type 2 diabetes mellitus without complication, without long-term current use of insulin  Not on meds    labs/meds/orders reviewed  staff to monitor and notify for any changes.  Labs reviewed, next full labs 7/25 and prn  she requires 24/7 care at home.

## 2025-07-11 ENCOUNTER — NURSING HOME VISIT (OUTPATIENT)
Dept: POST ACUTE CARE | Facility: EXTERNAL LOCATION | Age: 85
End: 2025-07-11
Payer: MEDICAID

## 2025-07-11 VITALS
DIASTOLIC BLOOD PRESSURE: 67 MMHG | WEIGHT: 114 LBS | SYSTOLIC BLOOD PRESSURE: 107 MMHG | OXYGEN SATURATION: 96 % | TEMPERATURE: 97.5 F | RESPIRATION RATE: 18 BRPM | HEART RATE: 76 BPM

## 2025-07-11 DIAGNOSIS — R53.81 PHYSICAL DEBILITY: ICD-10-CM

## 2025-07-11 DIAGNOSIS — R53.1 WEAKNESS: ICD-10-CM

## 2025-07-11 DIAGNOSIS — G30.1 MODERATE LATE ONSET ALZHEIMER'S DEMENTIA WITHOUT BEHAVIORAL DISTURBANCE, PSYCHOTIC DISTURBANCE, MOOD DISTURBANCE, OR ANXIETY (MULTI): Primary | ICD-10-CM

## 2025-07-11 DIAGNOSIS — E43 SEVERE PROTEIN-CALORIE MALNUTRITION (MULTI): ICD-10-CM

## 2025-07-11 DIAGNOSIS — F02.B0 MODERATE LATE ONSET ALZHEIMER'S DEMENTIA WITHOUT BEHAVIORAL DISTURBANCE, PSYCHOTIC DISTURBANCE, MOOD DISTURBANCE, OR ANXIETY (MULTI): Primary | ICD-10-CM

## 2025-07-11 DIAGNOSIS — I10 PRIMARY HYPERTENSION: ICD-10-CM

## 2025-07-11 PROCEDURE — 99309 SBSQ NF CARE MODERATE MDM 30: CPT | Performed by: NURSE PRACTITIONER

## 2025-07-11 NOTE — LETTER
Patient: Christina Davila  : 1940    Encounter Date: 2025    Subjective  Christina Davila is a 84 y.o. female Here for routine monthly visit.   HPI  There are no new problems and multiple health problems have been reviewed.  The course has been unchanged.  The patient  is severely confused.   She appears comfortable.   There are no family members present during time of visit.   She is sitting up in chair, denies any complaints when asked.  Eating and drinking better, weight has been stable the last few months.   No concerns per staff.    Review of Systems   Reason unable to perform ROS: difficult to obtain due to cognitive status, denies any pain when asked.       Objective  /67   Pulse 76   Temp 36.4 °C (97.5 °F)   Resp 18   Wt 51.7 kg (114 lb)   SpO2 96%     Physical Exam  Constitutional:       General: She is not in acute distress.     Comments: Frail, thin, cachectic female sitting up in chair.  Oriented to person   HENT:      Head: Normocephalic and atraumatic.   Eyes:      Conjunctiva/sclera: Conjunctivae normal.   Cardiovascular:      Rate and Rhythm: Normal rate and regular rhythm.   Pulmonary:      Effort: Pulmonary effort is normal. No respiratory distress.      Breath sounds: Normal breath sounds.   Abdominal:      General: Bowel sounds are normal. There is no distension.      Palpations: Abdomen is soft.      Tenderness: There is no abdominal tenderness.   Musculoskeletal:      Right lower leg: No edema.      Left lower leg: No edema.      Comments: [place] neck deformities to bilateral hands.  diffusely decreased muscle mass     Skin:     General: Skin is warm and dry.   Neurological:      General: No focal deficit present.      Mental Status: She is alert.      Comments: Oriented to person   Psychiatric:         Mood and Affect: Mood normal.         Behavior: Behavior normal.         Assessment & Plan  Moderate late onset Alzheimer's dementia without behavioral disturbance, psychotic  disturbance, mood disturbance, or anxiety (Multi)  The patient is appearing to have moderate to severe Alzheimer's dementia with behavioral disturbance, on depakote, seroquel, vistaril.  Sx appear controlled at present  Primary hypertension  continue with current medical management.  Not currently requiring antihypertensive medications.  Continue to monitor and initiate meds if indicated    Physical debility  Requires assistance with ADL's.  Unable to live independently  Severe protein-calorie malnutrition (Multi)  Dietician to evaluate for supplements.   Weakness  Requires assistance with ADL's.      labs/meds/orders reviewed  staff to monitor and notify for any changes.  Due for labs, ordered  she requires 24/7 care at home.          Electronically Signed By: MARY Castaneda   7/13/25  9:00 PM

## 2025-07-11 NOTE — PROGRESS NOTES
Era Davila is a 84 y.o. female Here for routine monthly visit.   HPI  There are no new problems and multiple health problems have been reviewed.  The course has been unchanged.  The patient  is severely confused.   She appears comfortable.   There are no family members present during time of visit.   She is sitting up in chair, denies any complaints when asked.  Eating and drinking better, weight has been stable the last few months.   No concerns per staff.    Review of Systems   Reason unable to perform ROS: difficult to obtain due to cognitive status, denies any pain when asked.       Objective   /67   Pulse 76   Temp 36.4 °C (97.5 °F)   Resp 18   Wt 51.7 kg (114 lb)   SpO2 96%     Physical Exam  Constitutional:       General: She is not in acute distress.     Comments: Frail, thin, cachectic female sitting up in chair.  Oriented to person   HENT:      Head: Normocephalic and atraumatic.   Eyes:      Conjunctiva/sclera: Conjunctivae normal.   Cardiovascular:      Rate and Rhythm: Normal rate and regular rhythm.   Pulmonary:      Effort: Pulmonary effort is normal. No respiratory distress.      Breath sounds: Normal breath sounds.   Abdominal:      General: Bowel sounds are normal. There is no distension.      Palpations: Abdomen is soft.      Tenderness: There is no abdominal tenderness.   Musculoskeletal:      Right lower leg: No edema.      Left lower leg: No edema.      Comments: [place] neck deformities to bilateral hands.  diffusely decreased muscle mass     Skin:     General: Skin is warm and dry.   Neurological:      General: No focal deficit present.      Mental Status: She is alert.      Comments: Oriented to person   Psychiatric:         Mood and Affect: Mood normal.         Behavior: Behavior normal.         Assessment & Plan  Moderate late onset Alzheimer's dementia without behavioral disturbance, psychotic disturbance, mood disturbance, or anxiety (Multi)  The patient is  appearing to have moderate to severe Alzheimer's dementia with behavioral disturbance, on depakote, seroquel, vistaril.  Sx appear controlled at present  Primary hypertension  continue with current medical management.  Not currently requiring antihypertensive medications.  Continue to monitor and initiate meds if indicated    Physical debility  Requires assistance with ADL's.  Unable to live independently  Severe protein-calorie malnutrition (Multi)  Dietician to evaluate for supplements.   Weakness  Requires assistance with ADL's.      labs/meds/orders reviewed  staff to monitor and notify for any changes.  Due for labs, ordered  she requires 24/7 care at home.

## 2025-07-14 NOTE — ASSESSMENT & PLAN NOTE
continue with current medical management.  Not currently requiring antihypertensive medications.  Continue to monitor and initiate meds if indicated

## 2025-07-17 ENCOUNTER — NURSING HOME VISIT (OUTPATIENT)
Dept: POST ACUTE CARE | Facility: EXTERNAL LOCATION | Age: 85
End: 2025-07-17
Payer: MEDICAID

## 2025-07-17 DIAGNOSIS — G30.1 MODERATE LATE ONSET ALZHEIMER'S DEMENTIA WITHOUT BEHAVIORAL DISTURBANCE, PSYCHOTIC DISTURBANCE, MOOD DISTURBANCE, OR ANXIETY (MULTI): Primary | ICD-10-CM

## 2025-07-17 DIAGNOSIS — E43 SEVERE PROTEIN-CALORIE MALNUTRITION (MULTI): ICD-10-CM

## 2025-07-17 DIAGNOSIS — I47.29 NSVT (NONSUSTAINED VENTRICULAR TACHYCARDIA) (MULTI): ICD-10-CM

## 2025-07-17 DIAGNOSIS — F02.B0 MODERATE LATE ONSET ALZHEIMER'S DEMENTIA WITHOUT BEHAVIORAL DISTURBANCE, PSYCHOTIC DISTURBANCE, MOOD DISTURBANCE, OR ANXIETY (MULTI): Primary | ICD-10-CM

## 2025-07-17 DIAGNOSIS — I10 PRIMARY HYPERTENSION: ICD-10-CM

## 2025-07-17 DIAGNOSIS — M06.9 RHEUMATOID ARTHRITIS, INVOLVING UNSPECIFIED SITE, UNSPECIFIED WHETHER RHEUMATOID FACTOR PRESENT (MULTI): ICD-10-CM

## 2025-07-17 PROCEDURE — 99308 SBSQ NF CARE LOW MDM 20: CPT | Performed by: INTERNAL MEDICINE

## 2025-07-17 NOTE — LETTER
Patient: Christina Davila  : 1940    Encounter Date: 2025    Patient is seen at [facility] today.  Patient's medications and chart is reviewed.  She is comfortable.  She has no fever or chill.  No respiratory distress.  Patient is unable to give detailed history.  According to nursing staff there is no other new concern.    Past medical history:  Hypertension  Hyperlipidemia  Diabetes mellitus  Failure to thrive  Rheumatoid arthritis  Presence of prosthetic heart valve  Nonsustained ventricular tachycardia   Moderate dementia  Protein calorie malnutrition    On examination:  General exam; comfortable  Vital signs: Heart rate is 72/min, respiratory rate 18,  Eyes: There is no pallor or jaundice  Neck: There is no JVD  Lungs: Breath sounds are normal  CVS: Heart sounds are regular there is no murmur  Abdomen: Soft bowel sounds are normal  CNS: Awake alert.  Moving in wheelchair  Musculoskeletal system chronic arthritis changes noted in the hand  Legs: No edema    Assessment and plan:  1.  Worsening dementia: Continue Namenda and supportive care  2.  Depression: Stable on current medications  3.  Hypertension: Readings are good without any medication  4. Protein calorie malnutrition: Continue nutritional supplements  5.  History of cardiac arrhythmias: Rhythm is sinus at present  6.  Anxiety: Continue with Ativan as needed    Electronically Signed By: Della Yancey MD   25  5:10 PM

## 2025-07-17 NOTE — PROGRESS NOTES
Patient is seen at [facility] today.  Patient's medications and chart is reviewed.  She is comfortable.  She has no fever or chill.  No respiratory distress.  Patient is unable to give detailed history.  According to nursing staff there is no other new concern.    Past medical history:  Hypertension  Hyperlipidemia  Diabetes mellitus  Failure to thrive  Rheumatoid arthritis  Presence of prosthetic heart valve  Nonsustained ventricular tachycardia   Moderate dementia  Protein calorie malnutrition    On examination:  General exam; comfortable  Vital signs: Heart rate is 72/min, respiratory rate 18,  Eyes: There is no pallor or jaundice  Neck: There is no JVD  Lungs: Breath sounds are normal  CVS: Heart sounds are regular there is no murmur  Abdomen: Soft bowel sounds are normal  CNS: Awake alert.  Moving in wheelchair  Musculoskeletal system chronic arthritis changes noted in the hand  Legs: No edema    Assessment and plan:  1.  Worsening dementia: Continue Namenda and supportive care  2.  Depression: Stable on current medications  3.  Hypertension: Readings are good without any medication  4. Protein calorie malnutrition: Continue nutritional supplements  5.  History of cardiac arrhythmias: Rhythm is sinus at present  6.  Anxiety: Continue with Ativan as needed

## 2025-08-12 ENCOUNTER — NURSING HOME VISIT (OUTPATIENT)
Dept: POST ACUTE CARE | Facility: EXTERNAL LOCATION | Age: 85
End: 2025-08-12
Payer: MEDICAID

## 2025-08-12 VITALS
SYSTOLIC BLOOD PRESSURE: 107 MMHG | WEIGHT: 114 LBS | RESPIRATION RATE: 18 BRPM | HEART RATE: 76 BPM | DIASTOLIC BLOOD PRESSURE: 67 MMHG | TEMPERATURE: 97.5 F | OXYGEN SATURATION: 96 %

## 2025-08-12 DIAGNOSIS — R53.81 PHYSICAL DEBILITY: ICD-10-CM

## 2025-08-12 DIAGNOSIS — M06.9 RHEUMATOID ARTHRITIS, INVOLVING UNSPECIFIED SITE, UNSPECIFIED WHETHER RHEUMATOID FACTOR PRESENT (MULTI): ICD-10-CM

## 2025-08-12 DIAGNOSIS — R53.1 WEAKNESS: ICD-10-CM

## 2025-08-12 DIAGNOSIS — G30.1 MODERATE LATE ONSET ALZHEIMER'S DEMENTIA WITHOUT BEHAVIORAL DISTURBANCE, PSYCHOTIC DISTURBANCE, MOOD DISTURBANCE, OR ANXIETY (MULTI): Primary | ICD-10-CM

## 2025-08-12 DIAGNOSIS — F02.B0 MODERATE LATE ONSET ALZHEIMER'S DEMENTIA WITHOUT BEHAVIORAL DISTURBANCE, PSYCHOTIC DISTURBANCE, MOOD DISTURBANCE, OR ANXIETY (MULTI): Primary | ICD-10-CM

## 2025-08-12 PROCEDURE — 99309 SBSQ NF CARE MODERATE MDM 30: CPT | Performed by: NURSE PRACTITIONER

## 2025-08-28 ENCOUNTER — NURSING HOME VISIT (OUTPATIENT)
Dept: POST ACUTE CARE | Facility: EXTERNAL LOCATION | Age: 85
End: 2025-08-28
Payer: MEDICAID

## 2025-08-28 DIAGNOSIS — B35.1 ONYCHOMYCOSIS OF TOENAIL: ICD-10-CM

## 2025-08-28 DIAGNOSIS — R53.81 PHYSICAL DEBILITY: ICD-10-CM

## 2025-08-28 DIAGNOSIS — G30.1 MODERATE LATE ONSET ALZHEIMER'S DEMENTIA WITHOUT BEHAVIORAL DISTURBANCE, PSYCHOTIC DISTURBANCE, MOOD DISTURBANCE, OR ANXIETY (MULTI): Primary | ICD-10-CM

## 2025-08-28 DIAGNOSIS — E43 SEVERE PROTEIN-CALORIE MALNUTRITION (MULTI): ICD-10-CM

## 2025-08-28 DIAGNOSIS — F02.B0 MODERATE LATE ONSET ALZHEIMER'S DEMENTIA WITHOUT BEHAVIORAL DISTURBANCE, PSYCHOTIC DISTURBANCE, MOOD DISTURBANCE, OR ANXIETY (MULTI): Primary | ICD-10-CM

## 2025-08-28 DIAGNOSIS — I10 PRIMARY HYPERTENSION: ICD-10-CM

## 2025-08-28 PROCEDURE — 99308 SBSQ NF CARE LOW MDM 20: CPT | Performed by: INTERNAL MEDICINE
